# Patient Record
Sex: MALE | Race: WHITE | NOT HISPANIC OR LATINO | Employment: OTHER | URBAN - METROPOLITAN AREA
[De-identification: names, ages, dates, MRNs, and addresses within clinical notes are randomized per-mention and may not be internally consistent; named-entity substitution may affect disease eponyms.]

---

## 2017-03-03 ENCOUNTER — OFFICE VISIT (OUTPATIENT)
Dept: ORTHOPEDIC SURGERY | Facility: CLINIC | Age: 32
End: 2017-03-03

## 2017-03-03 VITALS — WEIGHT: 296.8 LBS | BODY MASS INDEX: 39.34 KG/M2 | TEMPERATURE: 98 F | HEIGHT: 73 IN

## 2017-03-03 DIAGNOSIS — S92.215D CLOSED NONDISPLACED FRACTURE OF CUBOID OF LEFT FOOT WITH ROUTINE HEALING, SUBSEQUENT ENCOUNTER: ICD-10-CM

## 2017-03-03 DIAGNOSIS — M25.572 LEFT ANKLE PAIN, UNSPECIFIED CHRONICITY: ICD-10-CM

## 2017-03-03 DIAGNOSIS — S92.252D CLOSED DISPLACED FRACTURE OF NAVICULAR BONE OF LEFT FOOT WITH ROUTINE HEALING, SUBSEQUENT ENCOUNTER: Primary | ICD-10-CM

## 2017-03-03 DIAGNOSIS — M76.72 PERONEAL TENDONITIS, LEFT: ICD-10-CM

## 2017-03-03 DIAGNOSIS — M19.079 ARTHRITIS OF ANKLE: ICD-10-CM

## 2017-03-03 DIAGNOSIS — M25.872 ANKLE IMPINGEMENT SYNDROME, LEFT: ICD-10-CM

## 2017-03-03 PROBLEM — M25.879 ANKLE IMPINGEMENT SYNDROME: Status: ACTIVE | Noted: 2017-03-03

## 2017-03-03 PROBLEM — M76.70 PERONEAL TENDONITIS: Status: ACTIVE | Noted: 2017-03-03

## 2017-03-03 PROCEDURE — 99213 OFFICE O/P EST LOW 20 MIN: CPT | Performed by: ORTHOPAEDIC SURGERY

## 2017-03-03 PROCEDURE — 73610 X-RAY EXAM OF ANKLE: CPT | Performed by: ORTHOPAEDIC SURGERY

## 2017-03-03 PROCEDURE — 73630 X-RAY EXAM OF FOOT: CPT | Performed by: ORTHOPAEDIC SURGERY

## 2017-03-03 NOTE — PROGRESS NOTES
"Ankle Follow Up      Patient: Osvaldo Welsh    YOB: 1985 31 y.o. male    Chief Complaints: Ankle pain    History of Present Illness: Patient had injury to his left foot and ankle in July where he was pulling a rope broke and was fallen on by several other people.  He was seen and workup showed a microtrabecular fracture of the cuboid.  He was last seen on 12/9/16 and has since returned to regular work.  However he said he never had resolution of some pain that he is having over the medial ankle and now along the Achilles as well.  He said it becomes progressively worse throughout the day and improved some with rest overnight but never resolved.  He said it feels like something wants to pop in his ankle he said \"like your elbow pops sometimes\".  He also feels like his motion has not what it was prior to injury  HPI    ROS: ankle pain  Past Medical History   Diagnosis Date   • Asthma    • Diabetes        Physical Exam:   Vitals:    03/03/17 1510   Temp: 98 °F (36.7 °C)   TempSrc: Temporal Artery    Weight: 296 lb 12.8 oz (135 kg)   Height: 72.5\" (184.2 cm)     Well developed with normal mood.  He is with his mother today nonantalgic gait.  There was no focal pain over the cuboid today.  Nonantalgic gait.  He was focally tender over the medial anterior aspect of the ankle as well as some along the Achilles and a little bit along the peroneal tendons..  Good eversion strength with only mild discomfort he was able to actively invert with minimal discomfort.  Neutral dorsiflexion of the ankle but some discomfort anteromedially with maximum dorsiflexion.      Radiology: 3 of the left foot and 2 views of the left ankle were ordered today to evaluate pain reviewed and compared with x-rays from last July.  He does have some what appears to be significant spurring over the anterior aspect of the ankle.  Don't see any obvious osteochondral defects.  I don't see any obvious residual fracture or deformity of the " cuboid.      Assessment/Plan:  1.  The left foot cuboid microtrabecular fracture appears to have resolved.  2.  Left ankle pain of unclear etiology this may be an evolving stress fracture or anterior impingement with possible loose body.  I offered to give him seated work only he said he wanted to continue to work told him to manage his symptoms carefully if anything worsens she'll let me know otherwise we'll need to get an MRI of his left ankle to evaluate for stress fracture or loose body as this may require surgical treatment.  I will see him back pending those results.  He understands he is to call to schedule a follow-up appointment once his MRI has been scheduled.

## 2017-03-20 ENCOUNTER — OFFICE VISIT (OUTPATIENT)
Dept: ORTHOPEDIC SURGERY | Facility: CLINIC | Age: 32
End: 2017-03-20

## 2017-03-20 DIAGNOSIS — M19.079 ARTHRITIS OF ANKLE: ICD-10-CM

## 2017-03-20 DIAGNOSIS — M76.72 PERONEAL TENDONITIS, LEFT: ICD-10-CM

## 2017-03-20 PROCEDURE — 73721 MRI JNT OF LWR EXTRE W/O DYE: CPT | Performed by: ORTHOPAEDIC SURGERY

## 2017-03-27 ENCOUNTER — OFFICE VISIT (OUTPATIENT)
Dept: ORTHOPEDIC SURGERY | Facility: CLINIC | Age: 32
End: 2017-03-27

## 2017-03-27 VITALS — WEIGHT: 290 LBS | BODY MASS INDEX: 37.22 KG/M2 | TEMPERATURE: 97.7 F | HEIGHT: 74 IN

## 2017-03-27 DIAGNOSIS — S92.252D CLOSED DISPLACED FRACTURE OF NAVICULAR BONE OF LEFT FOOT WITH ROUTINE HEALING, SUBSEQUENT ENCOUNTER: ICD-10-CM

## 2017-03-27 DIAGNOSIS — M76.62 TENDONITIS, ACHILLES, LEFT: ICD-10-CM

## 2017-03-27 DIAGNOSIS — M25.872 ANKLE IMPINGEMENT SYNDROME, LEFT: Primary | ICD-10-CM

## 2017-03-27 PROCEDURE — 20605 DRAIN/INJ JOINT/BURSA W/O US: CPT | Performed by: ORTHOPAEDIC SURGERY

## 2017-03-27 PROCEDURE — 99214 OFFICE O/P EST MOD 30 MIN: CPT | Performed by: ORTHOPAEDIC SURGERY

## 2017-03-27 RX ORDER — METHYLPREDNISOLONE ACETATE 80 MG/ML
80 INJECTION, SUSPENSION INTRA-ARTICULAR; INTRALESIONAL; INTRAMUSCULAR; SOFT TISSUE
Status: COMPLETED | OUTPATIENT
Start: 2017-03-27 | End: 2017-03-27

## 2017-03-27 RX ORDER — LIDOCAINE HYDROCHLORIDE 10 MG/ML
1 INJECTION, SOLUTION INFILTRATION; PERINEURAL
Status: COMPLETED | OUTPATIENT
Start: 2017-03-27 | End: 2017-03-27

## 2017-03-27 RX ORDER — BUPIVACAINE HYDROCHLORIDE 5 MG/ML
1 INJECTION, SOLUTION PERINEURAL
Status: COMPLETED | OUTPATIENT
Start: 2017-03-27 | End: 2017-03-27

## 2017-03-27 RX ORDER — MELOXICAM 15 MG/1
TABLET ORAL
Qty: 30 TABLET | Refills: 1 | Status: SHIPPED | OUTPATIENT
Start: 2017-03-27 | End: 2017-12-20

## 2017-03-27 RX ADMIN — BUPIVACAINE HYDROCHLORIDE 1 ML: 5 INJECTION, SOLUTION PERINEURAL at 12:05

## 2017-03-27 RX ADMIN — LIDOCAINE HYDROCHLORIDE 1 ML: 10 INJECTION, SOLUTION INFILTRATION; PERINEURAL at 12:05

## 2017-03-27 RX ADMIN — METHYLPREDNISOLONE ACETATE 80 MG: 80 INJECTION, SUSPENSION INTRA-ARTICULAR; INTRALESIONAL; INTRAMUSCULAR; SOFT TISSUE at 12:05

## 2017-03-27 NOTE — PROGRESS NOTES
"Ankle Follow Up      Patient: Osvaldo Welsh    YOB: 1985 31 y.o. male    Chief Complaints: Ankle hurts    History of Present Illness: Persistent mild to moderate intermittent aching pain over the anteromedial and some posterior lateral aspect of the left ankle with prolonged walking and standing especially going up and down steps.  This os subsequent to injury to his left foot and ankle last July.  He was pulling a rope which broke and was fallen on by several people.  He initially was seen where workup showed a cuboid microtrabecular fracture.  Pain is worse with steps  HPI    ROS: ankle pain no numbness tingling chest pain or shortness of breath  Past Medical History:   Diagnosis Date   • Asthma    • Diabetes        Physical Exam:   Vitals:    03/27/17 1106   Temp: 97.7 °F (36.5 °C)   TempSrc: Temporal Artery    Weight: 290 lb (132 kg)   Height: 74\" (188 cm)     Well developed with normal mood.  Nonantalgic gait.  Main area that he indicates pain over the anteromedial aspect and somewhat posterior laterally along the left ankle.  Moderate discomfort anteromedially with maximum dorsiflexion , trace effusion to the left ankle.  Mild discomfort along the posterior lateral aspect left Achilles with neutral dorsiflexion with 5 out of 5 plantarflexion strength with no palpable defects of the Achilles.      Radiology: MRI films and report of the left hindfoot dated 3/20/17 were reviewed shows normal marrow signal throughout the ankle and hindfoot cuboid signal is now normal.  There is some ossification anterior to the medial malleolus and a rigid bony prominence along the anteromedial edge of the talar dome but no adjacent soft tissue edema.  Also some ridge of spur at the Achilles insertion and at the origin of the plantar aponeurosis.  The Achilles and plantar aponeurosis normal in signal and thickness today.      Assessment/Plan: 1.  Left ankle probable anteromedial impingement.  I don't see any " obvious loose bodies but does have some bony prominence to this area that may be giving him some impingement symptoms.    2.  Left Achilles tendinosis.    3.  Left cuboid microtrabecular fracture appears to have resolved clinically and radiographically.    The nothing to do from an operative standpoint for either of these at this time.  We discussed treatment options and he is been taking ibuprofen 800 mg 3 times a day with some relief.  I will have him stop this and change him to meloxicam 15 g being by mouth daily with GI precautions.    Also have him use his ASO for the next 3 weeks or so at work and see if this helps reduce of his ankle pain.  Also left ankle was injected anteromedially with steroid.  Also demonstrated heel cord stretching exercises from to do at least 4-5 times per day these were demonstrated for him.  I will see him back in 6 weeks is not at all improved in 3-4 weeks he is to let me know and call and to get a CT scan of his left ankle to get a better 3-D image of the morphology of his spurs for possible surgical planning.     Medium Joint Arthrocentesis  Date/Time: 3/27/2017 12:05 PM  Consent given by: patient  Site marked: site marked  Timeout: Immediately prior to procedure a time out was called to verify the correct patient, procedure, equipment, support staff and site/side marked as required   Supporting Documentation  Indications: pain   Procedure Details  Location: ankle - L ankle  Preparation: Patient was prepped and draped in the usual sterile fashion  Needle size: 22 G  Approach: anteromedial  Medications administered: 80 mg methylPREDNISolone acetate 80 MG/ML; 1 mL lidocaine 1 %; 1 mL bupivacaine  Patient tolerance: patient tolerated the procedure well with no immediate complications

## 2017-12-12 ENCOUNTER — APPOINTMENT (OUTPATIENT)
Dept: CT IMAGING | Facility: HOSPITAL | Age: 32
End: 2017-12-12

## 2017-12-12 ENCOUNTER — HOSPITAL ENCOUNTER (EMERGENCY)
Facility: HOSPITAL | Age: 32
Discharge: HOME | End: 2017-12-12
Attending: EMERGENCY MEDICINE | Admitting: EMERGENCY MEDICINE

## 2017-12-12 VITALS
OXYGEN SATURATION: 98 % | HEART RATE: 87 BPM | BODY MASS INDEX: 38.5 KG/M2 | HEIGHT: 74 IN | DIASTOLIC BLOOD PRESSURE: 96 MMHG | WEIGHT: 300 LBS | TEMPERATURE: 98.6 F | RESPIRATION RATE: 18 BRPM | SYSTOLIC BLOOD PRESSURE: 125 MMHG

## 2017-12-12 DIAGNOSIS — E88.89 KETOSIS (HCC): ICD-10-CM

## 2017-12-12 DIAGNOSIS — I10 UNCONTROLLED HYPERTENSION: ICD-10-CM

## 2017-12-12 DIAGNOSIS — H35.031 HYPERTENSIVE RETINOPATHY OF RIGHT EYE: ICD-10-CM

## 2017-12-12 DIAGNOSIS — E11.319 DIABETIC RETINOPATHY OF RIGHT EYE ASSOCIATED WITH TYPE 2 DIABETES MELLITUS, MACULAR EDEMA PRESENCE UNSPECIFIED, UNSPECIFIED RETINOPATHY SEVERITY (HCC): ICD-10-CM

## 2017-12-12 DIAGNOSIS — E11.65 UNCONTROLLED TYPE 2 DIABETES MELLITUS WITH HYPERGLYCEMIA, WITHOUT LONG-TERM CURRENT USE OF INSULIN (HCC): Primary | ICD-10-CM

## 2017-12-12 LAB
ALBUMIN SERPL-MCNC: 3.9 G/DL (ref 3.5–5.2)
ALBUMIN/GLOB SERPL: 0.9 G/DL
ALP SERPL-CCNC: 136 U/L (ref 39–117)
ALT SERPL W P-5'-P-CCNC: 30 U/L (ref 1–41)
ANION GAP SERPL CALCULATED.3IONS-SCNC: 14 MMOL/L
ARTERIAL PATENCY WRIST A: POSITIVE
AST SERPL-CCNC: 19 U/L (ref 1–40)
ATMOSPHERIC PRESS: 750.6 MMHG
B-OH-BUTYR SERPL-SCNC: 0.46 MMOL/L (ref 0.02–0.27)
BASE EXCESS BLDA CALC-SCNC: -1.5 MMOL/L (ref 0–2)
BASOPHILS # BLD AUTO: 0.04 10*3/MM3 (ref 0–0.2)
BASOPHILS NFR BLD AUTO: 0.3 % (ref 0–1.5)
BDY SITE: ABNORMAL
BILIRUB SERPL-MCNC: 0.3 MG/DL (ref 0.1–1.2)
BILIRUB UR QL STRIP: NEGATIVE
BUN BLD-MCNC: 20 MG/DL (ref 6–20)
BUN/CREAT SERPL: 22.2 (ref 7–25)
CALCIUM SPEC-SCNC: 9.2 MG/DL (ref 8.6–10.5)
CHLORIDE SERPL-SCNC: 97 MMOL/L (ref 98–107)
CLARITY UR: CLEAR
CO2 SERPL-SCNC: 26 MMOL/L (ref 22–29)
COLOR UR: YELLOW
CREAT BLD-MCNC: 0.9 MG/DL (ref 0.76–1.27)
DEPRECATED RDW RBC AUTO: 38.6 FL (ref 37–54)
EOSINOPHIL # BLD AUTO: 0.18 10*3/MM3 (ref 0–0.7)
EOSINOPHIL NFR BLD AUTO: 1.4 % (ref 0.3–6.2)
ERYTHROCYTE [DISTWIDTH] IN BLOOD BY AUTOMATED COUNT: 12.2 % (ref 11.5–14.5)
GFR SERPL CREATININE-BSD FRML MDRD: 97 ML/MIN/1.73
GLOBULIN UR ELPH-MCNC: 4.4 GM/DL
GLUCOSE BLD-MCNC: 272 MG/DL (ref 65–99)
GLUCOSE BLDC GLUCOMTR-MCNC: 235 MG/DL (ref 70–130)
GLUCOSE BLDC GLUCOMTR-MCNC: 288 MG/DL (ref 70–130)
GLUCOSE UR STRIP-MCNC: ABNORMAL MG/DL
HCO3 BLDA-SCNC: 22.4 MMOL/L (ref 22–28)
HCT VFR BLD AUTO: 52.6 % (ref 40.4–52.2)
HGB BLD-MCNC: 18.2 G/DL (ref 13.7–17.6)
HGB UR QL STRIP.AUTO: NEGATIVE
HOLD SPECIMEN: NORMAL
HOLD SPECIMEN: NORMAL
IMM GRANULOCYTES # BLD: 0.04 10*3/MM3 (ref 0–0.03)
IMM GRANULOCYTES NFR BLD: 0.3 % (ref 0–0.5)
KETONES UR QL STRIP: ABNORMAL
LEUKOCYTE ESTERASE UR QL STRIP.AUTO: NEGATIVE
LYMPHOCYTES # BLD AUTO: 4.35 10*3/MM3 (ref 0.9–4.8)
LYMPHOCYTES NFR BLD AUTO: 34.8 % (ref 19.6–45.3)
MCH RBC QN AUTO: 30.6 PG (ref 27–32.7)
MCHC RBC AUTO-ENTMCNC: 34.6 G/DL (ref 32.6–36.4)
MCV RBC AUTO: 88.4 FL (ref 79.8–96.2)
MODALITY: ABNORMAL
MONOCYTES # BLD AUTO: 0.76 10*3/MM3 (ref 0.2–1.2)
MONOCYTES NFR BLD AUTO: 6.1 % (ref 5–12)
NEUTROPHILS # BLD AUTO: 7.13 10*3/MM3 (ref 1.9–8.1)
NEUTROPHILS NFR BLD AUTO: 57.1 % (ref 42.7–76)
NITRITE UR QL STRIP: NEGATIVE
PCO2 BLDA: 34.9 MM HG (ref 35–45)
PH BLDA: 7.41 PH UNITS (ref 7.35–7.45)
PH UR STRIP.AUTO: <=5 [PH] (ref 5–8)
PLATELET # BLD AUTO: 219 10*3/MM3 (ref 140–500)
PMV BLD AUTO: 10.7 FL (ref 6–12)
PO2 BLDA: 95.8 MM HG (ref 80–100)
POTASSIUM BLD-SCNC: 3.9 MMOL/L (ref 3.5–5.2)
PROT SERPL-MCNC: 8.3 G/DL (ref 6–8.5)
PROT UR QL STRIP: NEGATIVE
RBC # BLD AUTO: 5.95 10*6/MM3 (ref 4.6–6)
SAO2 % BLDCOA: 97.6 % (ref 92–99)
SET MECH RESP RATE: 18
SODIUM BLD-SCNC: 137 MMOL/L (ref 136–145)
SP GR UR STRIP: >=1.03 (ref 1–1.03)
UROBILINOGEN UR QL STRIP: ABNORMAL
WBC NRBC COR # BLD: 12.5 10*3/MM3 (ref 4.5–10.7)
WHOLE BLOOD HOLD SPECIMEN: NORMAL
WHOLE BLOOD HOLD SPECIMEN: NORMAL

## 2017-12-12 PROCEDURE — 63710000001 INSULIN REGULAR HUMAN PER 5 UNITS: Performed by: EMERGENCY MEDICINE

## 2017-12-12 PROCEDURE — 96374 THER/PROPH/DIAG INJ IV PUSH: CPT

## 2017-12-12 PROCEDURE — 85025 COMPLETE CBC W/AUTO DIFF WBC: CPT | Performed by: EMERGENCY MEDICINE

## 2017-12-12 PROCEDURE — 81003 URINALYSIS AUTO W/O SCOPE: CPT | Performed by: EMERGENCY MEDICINE

## 2017-12-12 PROCEDURE — 82962 GLUCOSE BLOOD TEST: CPT

## 2017-12-12 PROCEDURE — 82010 KETONE BODYS QUAN: CPT | Performed by: EMERGENCY MEDICINE

## 2017-12-12 PROCEDURE — 80053 COMPREHEN METABOLIC PANEL: CPT | Performed by: EMERGENCY MEDICINE

## 2017-12-12 PROCEDURE — 36600 WITHDRAWAL OF ARTERIAL BLOOD: CPT

## 2017-12-12 PROCEDURE — 36415 COLL VENOUS BLD VENIPUNCTURE: CPT | Performed by: EMERGENCY MEDICINE

## 2017-12-12 PROCEDURE — 82803 BLOOD GASES ANY COMBINATION: CPT

## 2017-12-12 PROCEDURE — 99284 EMERGENCY DEPT VISIT MOD MDM: CPT

## 2017-12-12 PROCEDURE — 70450 CT HEAD/BRAIN W/O DYE: CPT

## 2017-12-12 PROCEDURE — 96361 HYDRATE IV INFUSION ADD-ON: CPT

## 2017-12-12 RX ORDER — LISINOPRIL 20 MG/1
20 TABLET ORAL DAILY
Qty: 30 TABLET | Refills: 0 | Status: SHIPPED | OUTPATIENT
Start: 2017-12-12 | End: 2017-12-20 | Stop reason: SDUPTHER

## 2017-12-12 RX ORDER — LABETALOL HYDROCHLORIDE 5 MG/ML
10 INJECTION, SOLUTION INTRAVENOUS ONCE
Status: COMPLETED | OUTPATIENT
Start: 2017-12-12 | End: 2017-12-12

## 2017-12-12 RX ORDER — SODIUM CHLORIDE 0.9 % (FLUSH) 0.9 %
10 SYRINGE (ML) INJECTION AS NEEDED
Status: DISCONTINUED | OUTPATIENT
Start: 2017-12-12 | End: 2017-12-13 | Stop reason: HOSPADM

## 2017-12-12 RX ORDER — IBUPROFEN 800 MG/1
800 TABLET ORAL EVERY 8 HOURS PRN
COMMUNITY
End: 2017-12-20

## 2017-12-12 RX ORDER — GLIMEPIRIDE 4 MG/1
4 TABLET ORAL
Qty: 30 TABLET | Refills: 0 | Status: SHIPPED | OUTPATIENT
Start: 2017-12-12 | End: 2017-12-20 | Stop reason: SDUPTHER

## 2017-12-12 RX ADMIN — SODIUM CHLORIDE 1000 ML: 9 INJECTION, SOLUTION INTRAVENOUS at 21:03

## 2017-12-12 RX ADMIN — LABETALOL HYDROCHLORIDE 10 MG: 5 INJECTION, SOLUTION INTRAVENOUS at 21:03

## 2017-12-12 RX ADMIN — INSULIN HUMAN 5 UNITS: 100 INJECTION, SOLUTION PARENTERAL at 22:40

## 2017-12-13 NOTE — ED TRIAGE NOTES
"Pt states that he woke up this past Friday am with blurred vision in right eye.  He went to see eye doctor this am andwas told he had hemorrhaging and swelling in the optic nerve but before he could be treated, he needed to get BP and blood sugar under control.  Pt states he has not been to doctor for treatment of BS and BP since 2010 r/t not having insurance. Pt states \"I dont feel any different than normal except not being able to see\"  "

## 2017-12-13 NOTE — ED PROVIDER NOTES
" EMERGENCY DEPARTMENT ENCOUNTER    CHIEF COMPLAINT  Chief Complaint: blurred vision  History given by: patient, mother  History limited by: nothing  Room Number: 23/23  PMD: Reji Burciaga MD      HPI:  Pt is a 32 y.o. male who presents complaining of blurred vision in his right eye for the last 5 days. Pt states that he originally only had blurred vision in his peripheral vision and it has gradually become worse since that time. Pt states that he last saw a physician in 2010, when he lost his vision in his left eye for an unspecified reason. Pt states that he was prescribed metformin in 2010 but states that he has not taken it since that time. Pt denies N/V, CP, SOA, numbness or tingling. Pt states that his BP was 200's/100's and his blood glucose was 498 last night. Pt states that he was sent to the ED by his opthalmologist for further evaluation of the \"hemorrhages behind both of his eyes\", hyperglycemia and hypertension.    Duration:  5 days  Onset: gradual  Timing: constant  Location: right eye  Radiation: N/A  Quality: blurred  Intensity/Severity: moderate  Progression: worsening  Associated Symptoms: none  Aggravating Factors: none  Alleviating Factors: none  Previous Episodes: none  Treatment before arrival: Pt states that he was sent to the ED by his opthalmologist for further evaluation of the \"hemorrhages behind both of his eyes\", hyperglycemia and hypertension.      PAST MEDICAL HISTORY  Active Ambulatory Problems     Diagnosis Date Noted   • Closed displaced fracture of navicular bone of left foot 08/02/2016   • Sprain 08/02/2016   • Closed nondisplaced fracture of cuboid of left foot 08/15/2016   • Sprain of right ankle 09/08/2016   • Arthritis of ankle 03/03/2017   • Peroneal tendonitis 03/03/2017   • Ankle impingement syndrome 03/03/2017   • Left ankle pain 03/03/2017   • Tendonitis, Achilles, left 03/27/2017     Resolved Ambulatory Problems     Diagnosis Date Noted   • No Resolved Ambulatory " Problems     Past Medical History:   Diagnosis Date   • Asthma    • Diabetes    • Hypertension    • Optic nerve hemorrhage, left        PAST SURGICAL HISTORY  Past Surgical History:   Procedure Laterality Date   • TONSILLECTOMY AND ADENOIDECTOMY         FAMILY HISTORY  Family History   Problem Relation Age of Onset   • Diabetes Father    • Hypertension Maternal Grandmother    • Hypertension Maternal Grandfather    • Diabetes Paternal Grandmother    • Diabetes Paternal Grandfather        SOCIAL HISTORY  Social History     Social History   • Marital status: Single     Spouse name: N/A   • Number of children: N/A   • Years of education: N/A     Occupational History   • Not on file.     Social History Main Topics   • Smoking status: Never Smoker   • Smokeless tobacco: Never Used   • Alcohol use No   • Drug use: No   • Sexual activity: Defer     Other Topics Concern   • Not on file     Social History Narrative   • No narrative on file       ALLERGIES  Review of patient's allergies indicates no known allergies.    REVIEW OF SYSTEMS  Review of Systems   Constitutional: Negative for activity change, appetite change and fever.   HENT: Negative for congestion and sore throat.    Eyes: Positive for visual disturbance (blurred vision in right eye).   Respiratory: Negative for cough and shortness of breath.    Cardiovascular: Negative for chest pain and leg swelling.   Gastrointestinal: Negative for abdominal pain, diarrhea and vomiting.   Endocrine: Negative.    Genitourinary: Negative for decreased urine volume and dysuria.   Musculoskeletal: Negative for neck pain.   Skin: Negative for rash and wound.   Allergic/Immunologic: Negative.    Neurological: Negative for weakness, numbness and headaches.   Hematological: Negative.    Psychiatric/Behavioral: Negative.    All other systems reviewed and are negative.      PHYSICAL EXAM  ED Triage Vitals   Temp Heart Rate Resp BP SpO2   12/12/17 1857 12/12/17 1857 12/12/17 1857 12/12/17  1935 12/12/17 1857   98.4 °F (36.9 °C) 109 18 186/107 99 %      Temp src Heart Rate Source Patient Position BP Location FiO2 (%)   -- -- 12/12/17 1935 12/12/17 1935 --     Sitting Left arm        Physical Exam   Constitutional: He is oriented to person, place, and time and well-developed, well-nourished, and in no distress.   HENT:   Head: Normocephalic and atraumatic.   Eyes: EOM are normal. Pupils are equal, round, and reactive to light.   No visible hemorrhage on fundoscopic exam   Neck: Normal range of motion. Neck supple.   Cardiovascular: Regular rhythm and normal heart sounds.  Tachycardia present.    Pulmonary/Chest: Effort normal and breath sounds normal. No respiratory distress.   Abdominal: Soft. There is no tenderness. There is no rebound and no guarding.   obese   Musculoskeletal: Normal range of motion. He exhibits no edema.   Neurological: He is alert and oriented to person, place, and time. He has normal sensation and normal strength.   Skin: Skin is warm and dry.   Psychiatric: Mood and affect normal.   Nursing note and vitals reviewed.      LAB RESULTS  Lab Results (last 24 hours)     Procedure Component Value Units Date/Time    POC Glucose Once [[id number]]  (Abnormal) Collected:  12/12/17 1902    Specimen:  Blood Updated:  12/12/17 1903     Glucose 288 (H) mg/dL     Narrative:       Meter: [id number] : [id number] Hugo Pratt    CBC & Differential [[id number]] Collected:  12/12/17 2008    Specimen:  Blood Updated:  12/12/17 2028    Narrative:       The following orders were created for panel order CBC & Differential.  Procedure                               Abnormality         Status                     ---------                               -----------         ------                     CBC Auto Differential[[id number]]        Abnormal            Final result                 Please view results for these tests on the individual orders.    Comprehensive Metabolic Panel [[id number]]  (Abnormal)  Collected:  12/12/17 2008    Specimen:  Blood Updated:  12/12/17 2110     Glucose 272 (H) mg/dL      BUN 20 mg/dL      Creatinine 0.90 mg/dL      Sodium 137 mmol/L      Potassium 3.9 mmol/L      Chloride 97 (L) mmol/L      CO2 26.0 mmol/L      Calcium 9.2 mg/dL      Total Protein 8.3 g/dL      Albumin 3.90 g/dL      ALT (SGPT) 30 U/L      AST (SGOT) 19 U/L      Alkaline Phosphatase 136 (H) U/L      Total Bilirubin 0.3 mg/dL      eGFR Non African Amer 97 mL/min/1.73      Globulin 4.4 gm/dL      A/G Ratio 0.9 g/dL      BUN/Creatinine Ratio 22.2     Anion Gap 14.0 mmol/L     Ketone Bodies, Serum (Not performed at [facility]) [[id number]] Collected:  12/12/17 2008    Specimen:  Blood Updated:  12/12/17 2110    Narrative:       The following orders were created for panel order Ketone Bodies, Serum (Not performed at [facility]).  Procedure                               Abnormality         Status                     ---------                               -----------         ------                     Beta Hydroxybutyrate Teofilo...[[id number]]  Abnormal            Final result                 Please view results for these tests on the individual orders.    CBC Auto Differential [[id number]]  (Abnormal) Collected:  12/12/17 2008    Specimen:  Blood Updated:  12/12/17 2028     WBC 12.50 (H) 10*3/mm3      RBC 5.95 10*6/mm3      Hemoglobin 18.2 (H) g/dL      Hematocrit 52.6 (H) %      MCV 88.4 fL      MCH 30.6 pg      MCHC 34.6 g/dL      RDW 12.2 %      RDW-SD 38.6 fl      MPV 10.7 fL      Platelets 219 10*3/mm3      Neutrophil % 57.1 %      Lymphocyte % 34.8 %      Monocyte % 6.1 %      Eosinophil % 1.4 %      Basophil % 0.3 %      Immature Grans % 0.3 %      Neutrophils, Absolute 7.13 10*3/mm3      Lymphocytes, Absolute 4.35 10*3/mm3      Monocytes, Absolute 0.76 10*3/mm3      Eosinophils, Absolute 0.18 10*3/mm3      Basophils, Absolute 0.04 10*3/mm3      Immature Grans, Absolute 0.04 (H) 10*3/mm3     Beta Hydroxybutyrate Quantitative  [[id number]]  (Abnormal) Collected:  12/12/17 2008    Specimen:  Blood Updated:  12/12/17 2110     Beta-Hydroxybutyrate Quant 0.464 (H) mmol/L     Urinalysis With / Culture If Indicated - Urine, Clean Catch [[id number]]  (Abnormal) Collected:  12/12/17 2013    Specimen:  Urine from Urine, Clean Catch Updated:  12/12/17 2025     Color, UA Yellow     Appearance, UA Clear     pH, UA <=5.0     Specific Gravity, UA >=1.030     Glucose, UA >=1000 mg/dL (3+) (A)     Ketones, UA 15 mg/dL (1+) (A)     Bilirubin, UA Negative     Blood, UA Negative     Protein, UA Negative     Leuk Esterase, UA Negative     Nitrite, UA Negative     Urobilinogen, UA 0.2 E.U./dL    Narrative:       Urine microscopic not indicated.    Blood Gas, Arterial [[id number]]  (Abnormal) Collected:  12/12/17 2202    Specimen:  Arterial Blood Updated:  12/12/17 2204     Site Arterial: right radial     Nico's Test Positive     pH, Arterial 7.414 pH units      pCO2, Arterial 34.9 (L) mm Hg      pO2, Arterial 95.8 mm Hg      HCO3, Arterial 22.4 mmol/L      Base Excess, Arterial -1.5 (L) mmol/L      O2 Saturation Calculated 97.6 %      Barometric Pressure for Blood Gas 750.6 mmHg      Modality Room Air     Set Mech Resp Rate 18    Narrative:       sat 97 Meter: [id number] : [id number] Mumtaz Collins    POC Glucose Once [[id number]]  (Abnormal) Collected:  12/12/17 2222    Specimen:  Blood Updated:  12/12/17 2224     Glucose 235 (H) mg/dL     Narrative:       Meter: [id number] : [id number] Arun Jones RN          I ordered the above labs and reviewed the results    RADIOLOGY  CT Head Without Contrast   Preliminary Result   No definite acute intracranial pathology. Orbits are unremarkable.                       I ordered the above noted radiological studies. Interpreted by radiologist. Reviewed by me in PACS.       PROCEDURES  Procedures      PROGRESS AND CONSULTS  ED Course     2040- Ordered IVF for hydration and labetalol for HTN. Ordered CT Head for  further evaluation.    2232- Ordered insulin for hyperglycemia. Placed call to [facility] for admission.    10:36 PM  Latest vital signs   BP- 144/99 HR- 83 Temp- 98.4 °F (36.9 °C) O2 sat- 98%    2246- Discussed the pt's case with Dr. Tang ([facility]), who suggests discharging the pt home on lisinopril and amaryl.    2251- Rechecked pt. Pt is resting comfortably and HK=282/91. Notified pt and family of the pt's lab and imaging results, including the pt's elevated blood glucose and CT Head results. Discussed the plan to discharge the pt home with prescriptions for lisinopril and amaryl. I instructed the pt to follow up with his PMD. Pt and family agree with the plan and all questions were addressed.    10:59 PM  Latest vital signs   BP- 138/95 HR- 83 Temp- 98.4 °F (36.9 °C) O2 sat- 97%      MEDICAL DECISION MAKING  Results were reviewed/discussed with the patient and they were also made aware of online access. Pt also made aware that some labs, such as cultures, will not be resulted during ER visit and follow up with PMD is necessary.     MDM  Number of Diagnoses or Management Options  Diabetic retinopathy of right eye associated with type 2 diabetes mellitus, macular edema presence unspecified, unspecified retinopathy severity:   Hypertensive retinopathy of right eye:   Ketosis:   Uncontrolled hypertension:   Uncontrolled type 2 diabetes mellitus with hyperglycemia, without long-term current use of insulin:   Diagnosis management comments: Reviewed paper work sent with patient from Ophthalmologist. Dx with Diabetic and Hypertensive Retinopathy.         Amount and/or Complexity of Data Reviewed  Clinical lab tests: ordered and reviewed (Beta-hydroxybutyrate=0.464)  Tests in the radiology section of CPT®: ordered and reviewed (CT Head shows nothing acute)  Obtain history from someone other than the patient: yes (mother)  Discuss the patient with other providers: yes (D/w Dr. Tang ([facility]))  Independent visualization of images,  tracings, or specimens: yes    Patient Progress  Patient progress: stable         DIAGNOSIS  Final diagnoses:   Uncontrolled type 2 diabetes mellitus with hyperglycemia, without long-term current use of insulin   Ketosis   Uncontrolled hypertension   Diabetic retinopathy of right eye associated with type 2 diabetes mellitus, macular edema presence unspecified, unspecified retinopathy severity   Hypertensive retinopathy of right eye       DISPOSITION  DISCHARGE    Patient discharged in stable condition.    Reviewed implications of results, diagnosis, meds, responsibility to follow up, warning signs and symptoms of possible worsening, potential complications and reasons to return to ER, including fever, worsening pain or any concerns.    Patient/Family voiced understanding of above instructions.    Discussed plan for discharge, as there is no emergent indication for admission.  Pt/family is agreeable and understands need for follow up and repeat testing.  Pt is aware that discharge does not mean that nothing is wrong but it indicates no emergency is present that requires admission and they must continue care with follow-up as given below or physician of their choice.     FOLLOW-UP  Reji Burciaga MD  [id number] [id number] [phone]    Schedule an appointment as soon as possible for a visit           Medication List      New Prescriptions          glimepiride 4 MG tablet   Commonly known as:  AMARYL   Take 1 tablet by mouth Every Morning Before Breakfast.       lisinopril 20 MG tablet   Commonly known as:  PRINIVIL,ZESTRIL   Take 1 tablet by mouth Daily.         Stop          meloxicam 15 MG tablet   Commonly known as:  MOBIC       omeprazole 40 MG capsule   Commonly known as:  PRILOSEC             Latest Documented Vital Signs:  As of 11:00 PM  BP- 138/95 HR- 83 Temp- 98.4 °F (36.9 °C) O2 sat- 97%    --  Documentation assistance provided by mark Morris for Dr. Graham.  Information  recorded by the scribe was done at my direction and has been verified and validated by me.     Valorie Morris  12/12/17 [id number]       Rashad Graham MD  12/12/17 [id number]

## 2017-12-14 ENCOUNTER — TELEPHONE (OUTPATIENT)
Dept: SOCIAL WORK | Facility: HOSPITAL | Age: 32
End: 2017-12-14

## 2017-12-14 NOTE — TELEPHONE ENCOUNTER
Spoke with pt today in f/u and he states he is doing better. He was able to get his scripts filled for the Glimepiride and Lisinopril and is taking them as directed. He has a f/u with his PCP on 12/19/17. No other questions or concerns voiced by pt at this time. Patricia JOHNSON

## 2017-12-20 ENCOUNTER — OFFICE VISIT (OUTPATIENT)
Dept: FAMILY MEDICINE CLINIC | Facility: CLINIC | Age: 32
End: 2017-12-20

## 2017-12-20 ENCOUNTER — TELEPHONE (OUTPATIENT)
Dept: FAMILY MEDICINE CLINIC | Facility: CLINIC | Age: 32
End: 2017-12-20

## 2017-12-20 VITALS
OXYGEN SATURATION: 97 % | HEART RATE: 94 BPM | WEIGHT: 281 LBS | SYSTOLIC BLOOD PRESSURE: 130 MMHG | HEIGHT: 74 IN | BODY MASS INDEX: 36.06 KG/M2 | DIASTOLIC BLOOD PRESSURE: 82 MMHG | TEMPERATURE: 97.9 F

## 2017-12-20 DIAGNOSIS — H44.813: ICD-10-CM

## 2017-12-20 DIAGNOSIS — E11.9 TYPE 2 DIABETES MELLITUS WITHOUT COMPLICATION, WITHOUT LONG-TERM CURRENT USE OF INSULIN (HCC): Primary | ICD-10-CM

## 2017-12-20 DIAGNOSIS — I10 ESSENTIAL HYPERTENSION: ICD-10-CM

## 2017-12-20 DIAGNOSIS — IMO0001 CLASS 2 OBESITY DUE TO EXCESS CALORIES WITH SERIOUS COMORBIDITY AND BODY MASS INDEX (BMI) OF 36.0 TO 36.9 IN ADULT: ICD-10-CM

## 2017-12-20 LAB
ALBUMIN SERPL-MCNC: 4 G/DL (ref 3.5–5.2)
ALBUMIN UR-MCNC: 20 MG/L (ref 0–20)
ALBUMIN/GLOB SERPL: 1.1 G/DL
ALP SERPL-CCNC: 110 U/L (ref 39–117)
ALT SERPL W P-5'-P-CCNC: 41 U/L (ref 1–41)
ANION GAP SERPL CALCULATED.3IONS-SCNC: 13.4 MMOL/L
AST SERPL-CCNC: 22 U/L (ref 1–40)
BACTERIA UR QL AUTO: ABNORMAL /HPF
BILIRUB SERPL-MCNC: 0.4 MG/DL (ref 0.1–1.2)
BILIRUB UR QL STRIP: NEGATIVE
BUN BLD-MCNC: 22 MG/DL (ref 6–20)
BUN/CREAT SERPL: 25.6 (ref 7–25)
CALCIUM SPEC-SCNC: 9.3 MG/DL (ref 8.6–10.5)
CHLORIDE SERPL-SCNC: 98 MMOL/L (ref 98–107)
CHOLEST SERPL-MCNC: 208 MG/DL (ref 0–200)
CLARITY UR: CLEAR
CO2 SERPL-SCNC: 24.6 MMOL/L (ref 22–29)
COLOR UR: YELLOW
CREAT BLD-MCNC: 0.86 MG/DL (ref 0.76–1.27)
ERYTHROCYTE [DISTWIDTH] IN BLOOD BY AUTOMATED COUNT: 12 % (ref 4.5–15)
GFR SERPL CREATININE-BSD FRML MDRD: 102 ML/MIN/1.73
GLOBULIN UR ELPH-MCNC: 3.8 GM/DL
GLUCOSE BLD-MCNC: 263 MG/DL (ref 65–99)
GLUCOSE UR STRIP-MCNC: ABNORMAL MG/DL
HBA1C MFR BLD: 11.49 % (ref 4.8–5.6)
HCT VFR BLD AUTO: 53 % (ref 35–60)
HDLC SERPL-MCNC: 36 MG/DL (ref 40–60)
HGB BLD-MCNC: 17 G/DL (ref 13.5–18)
HGB UR QL STRIP.AUTO: NEGATIVE
KETONES UR QL STRIP: NEGATIVE
LDLC SERPL CALC-MCNC: 137 MG/DL (ref 0–100)
LDLC/HDLC SERPL: 3.81 {RATIO}
LEUKOCYTE ESTERASE UR QL STRIP.AUTO: NEGATIVE
LYMPHOCYTES # BLD AUTO: 3.6 10*3/MM3 (ref 1.2–3.4)
LYMPHOCYTES NFR BLD AUTO: 31.9 % (ref 21–51)
MCH RBC QN AUTO: 28.5 PG (ref 26.1–33.1)
MCHC RBC AUTO-ENTMCNC: 32.1 G/DL (ref 33–37)
MCV RBC AUTO: 88.8 FL (ref 80–99)
MONOCYTES # BLD AUTO: 0.4 10*3/MM3 (ref 0.1–0.6)
MONOCYTES NFR BLD AUTO: 3.3 % (ref 2–9)
NEUTROPHILS # BLD AUTO: 7.3 10*3/MM3 (ref 1.4–6.5)
NEUTROPHILS NFR BLD AUTO: 64.8 % (ref 42–75)
NITRITE UR QL STRIP: NEGATIVE
PH UR STRIP.AUTO: <=5 [PH] (ref 4.6–8)
PLATELET # BLD AUTO: 237 10*3/MM3 (ref 150–450)
PMV BLD AUTO: 8.3 FL (ref 7.1–10.5)
POTASSIUM BLD-SCNC: 4.4 MMOL/L (ref 3.5–5.2)
PROT SERPL-MCNC: 7.8 G/DL (ref 6–8.5)
PROT UR QL STRIP: NEGATIVE
RBC # BLD AUTO: 5.97 10*6/MM3 (ref 4–6)
RBC # UR: ABNORMAL /HPF
REF LAB TEST METHOD: ABNORMAL
SODIUM BLD-SCNC: 136 MMOL/L (ref 136–145)
SP GR UR STRIP: 1.02 (ref 1–1.03)
SQUAMOUS #/AREA URNS HPF: ABNORMAL /HPF
TRIGL SERPL-MCNC: 175 MG/DL (ref 0–150)
TSH SERPL DL<=0.05 MIU/L-ACNC: 2.13 MIU/ML (ref 0.27–4.2)
UROBILINOGEN UR QL STRIP: ABNORMAL
VLDLC SERPL-MCNC: 35 MG/DL (ref 5–40)
WBC NRBC COR # BLD: 11.2 10*3/MM3 (ref 4.5–10)
WBC UR QL AUTO: ABNORMAL /HPF

## 2017-12-20 PROCEDURE — 99214 OFFICE O/P EST MOD 30 MIN: CPT | Performed by: NURSE PRACTITIONER

## 2017-12-20 PROCEDURE — 85025 COMPLETE CBC W/AUTO DIFF WBC: CPT | Performed by: NURSE PRACTITIONER

## 2017-12-20 PROCEDURE — 80061 LIPID PANEL: CPT | Performed by: NURSE PRACTITIONER

## 2017-12-20 PROCEDURE — 83036 HEMOGLOBIN GLYCOSYLATED A1C: CPT | Performed by: NURSE PRACTITIONER

## 2017-12-20 PROCEDURE — 36415 COLL VENOUS BLD VENIPUNCTURE: CPT | Performed by: NURSE PRACTITIONER

## 2017-12-20 PROCEDURE — 84443 ASSAY THYROID STIM HORMONE: CPT | Performed by: NURSE PRACTITIONER

## 2017-12-20 PROCEDURE — 82043 UR ALBUMIN QUANTITATIVE: CPT | Performed by: NURSE PRACTITIONER

## 2017-12-20 PROCEDURE — 81001 URINALYSIS AUTO W/SCOPE: CPT | Performed by: NURSE PRACTITIONER

## 2017-12-20 PROCEDURE — 80053 COMPREHEN METABOLIC PANEL: CPT | Performed by: NURSE PRACTITIONER

## 2017-12-20 RX ORDER — LISINOPRIL 20 MG/1
20 TABLET ORAL DAILY
Qty: 30 TABLET | Refills: 5 | Status: SHIPPED | OUTPATIENT
Start: 2017-12-20 | End: 2018-01-04 | Stop reason: SDUPTHER

## 2017-12-20 RX ORDER — BLOOD-GLUCOSE METER
EACH MISCELLANEOUS
Qty: 1 KIT | Refills: 0 | Status: SHIPPED | OUTPATIENT
Start: 2017-12-20

## 2017-12-20 RX ORDER — LANCETS
EACH MISCELLANEOUS
Qty: 60 EACH | Refills: 11 | Status: SHIPPED | OUTPATIENT
Start: 2017-12-20 | End: 2018-10-10 | Stop reason: SDUPTHER

## 2017-12-20 RX ORDER — ATORVASTATIN CALCIUM 10 MG/1
10 TABLET, FILM COATED ORAL NIGHTLY
Qty: 30 TABLET | Refills: 5 | Status: SHIPPED | OUTPATIENT
Start: 2017-12-20 | End: 2018-10-10 | Stop reason: SDUPTHER

## 2017-12-20 RX ORDER — GLIMEPIRIDE 4 MG/1
4 TABLET ORAL
Qty: 30 TABLET | Refills: 5 | Status: SHIPPED | OUTPATIENT
Start: 2017-12-20 | End: 2018-01-04 | Stop reason: SDUPTHER

## 2017-12-20 NOTE — PROGRESS NOTES
Subjective   Osvaldo Welsh is a 32 y.o. male.     History of Present Illness   Here to FU on [facility] ER for B hemorrhages on eyes was seeing Dr Gómez but was referred to [facility] and referred to ER d/t BP elevated 200/100 and BS elevated 498, with CT head normal, found to have ketosis and started on lisinopril 20 mg daily and glimepiride 4 mg daily, Prev took metformin back in 2010 and caused fatigue, with fam hx father also DM and can't tolerated metformin, seeing [facility] 01/04/17 needs updated labs and A1C, now checking BS at home 250s, BP running 130-140/80s, no CP dizziness HA LE edema, with cont visual disturbance, intermittent clamminess, fatigue and hot flashes prev urin freq thirst and hunger resolved    The following portions of the patient's history were reviewed and updated as appropriate: allergies, current medications, past family history, past medical history, past social history, past surgical history and problem list.    Review of Systems   Constitutional: Negative for fever.   Eyes: Positive for visual disturbance. Negative for photophobia, pain, discharge, redness and itching.   Respiratory: Negative for cough, shortness of breath and wheezing.    Cardiovascular: Negative for chest pain, palpitations and leg swelling.   Endocrine: Negative for cold intolerance, heat intolerance, polydipsia, polyphagia and polyuria.   Neurological: Negative for dizziness and headaches.   All other systems reviewed and are negative.      Objective   Physical Exam   Constitutional: He is oriented to person, place, and time. He appears well-developed and well-nourished.   HENT:   Head: Normocephalic and atraumatic.   Eyes: Conjunctivae and EOM are normal. Pupils are equal, round, and reactive to light.   Cardiovascular: Normal rate, regular rhythm and normal heart sounds.    Pulmonary/Chest: Effort normal and breath sounds normal.   Musculoskeletal: Normal range of motion.    Osvaldo had a diabetic  foot exam performed (sensation intact, pulses strong, well hydrated, no ulcers or fungal toenails) today.  Neurological: He is alert and oriented to person, place, and time.   Skin: Skin is warm and dry.   Psychiatric: He has a normal mood and affect. His behavior is normal. Judgment and thought content normal.   Vitals reviewed.      Assessment/Plan   Osvaldo was seen today for follow-up.    Diagnoses and all orders for this visit:    Type 2 diabetes mellitus without complication, without long-term current use of insulin  -     Hemoglobin A1c  -     Lipid Panel  -     Comprehensive Metabolic Panel  -     TSH  -     Urinalysis With Microscopic - Urine, Clean Catch  -     MicroAlbumin, Urine, Random - Urine, Clean Catch  -     CBC & Differential    Essential hypertension  -     Lipid Panel  -     Comprehensive Metabolic Panel    Class 2 obesity due to excess calories with serious comorbidity and body mass index (BMI) of 36.0 to 36.9 in adult    Eye hemorrhage, bilateral    Other orders  -     Lancets (ACCU-CHEK MULTICLIX) lancets; Dx e11.9 use to check BS BID ok to substitute formulary preferred  -     Blood Glucose Monitoring Suppl (ACCU-CHEK MANAV PLUS) w/Device kit; Dx e11.9 use to check BS BID ok to substitute formulary preferred  -     glucose blood (ACCU-CHEK MANAV PLUS) test strip; Dx e11.9 use to check BS BID ok to substitute formulary preferred  -     Discontinue: SITagliptin (JANUVIA) 100 MG tablet; Take 1 tablet by mouth Daily.  -     aspirin 81 MG tablet; Take 1 tablet by mouth Daily.  -     glimepiride (AMARYL) 4 MG tablet; Take 1 tablet by mouth Every Morning Before Breakfast.  -     SITagliptin (JANUVIA) 100 MG tablet; Take 1 tablet by mouth Daily.  -     lisinopril (PRINIVIL,ZESTRIL) 20 MG tablet; Take 1 tablet by mouth Daily.    reviewed hospital records, imaging, labs, check labs and call with results, erx meter, test strips, and lancets, start januvia 100 mg daily and gave samples and coupon, cont  glimepiride 4 mg daily and enc healthy DM Diet and regular exercise for wt loss and BS control, gave DM handout and diet, cont lisinopril 20 mg daily and start ASA 81 mg daily, cont FU with [facility] and will fax lab results, DM foot exam today, Current outpatient and discharge medications have been reconciled for the patient.  Brittnee Tafoya, APRN

## 2017-12-20 NOTE — PATIENT INSTRUCTIONS
reviewed hospital records, imaging, labs, check labs and call with results, erx meter, test strips, and lancets, start januvia 100 mg daily and gave samples and coupon, cont glimepiride 4 mg daily and enc healthy DM Diet and regular exercise for wt loss and BS control, gave DM handout and diet, cont lisinopril 20 mg daily and start ASA 81 mg daily, cont FU with [facility] and will fax lab results, DM foot exam today, Current outpatient and discharge medications have been reconciled for the patient.  MIGUEL Sosa  Prediabetes Eating Plan  Prediabetes--also called impaired glucose tolerance or impaired fasting glucose--is a condition that causes blood sugar (blood glucose) levels to be higher than normal. Following a healthy diet can help to keep prediabetes under control. It can also help to lower the risk of type 2 diabetes and heart disease, which are increased in people who have prediabetes. Along with regular exercise, a healthy diet:  · Promotes weight loss.  · Helps to control blood sugar levels.  · Helps to improve the way that the body uses insulin.  WHAT DO I NEED TO KNOW ABOUT THIS EATING PLAN?  · Use the glycemic index (GI) to plan your meals. The index tells you how quickly a food will raise your blood sugar. Choose low-GI foods. These foods take a longer time to raise blood sugar.  · Pay close attention to the amount of carbohydrates in the food that you eat. Carbohydrates increase blood sugar levels.  · Keep track of how many calories you take in. Eating the right amount of calories will help you to achieve a healthy weight. Losing about 7 percent of your starting weight can help to prevent type 2 diabetes.  · You may want to follow a Mediterranean diet. This diet includes a lot of vegetables, lean meats or fish, whole grains, fruits, and healthy oils and fats.  WHAT FOODS CAN I EAT?  Grains  Whole grains, such as whole-wheat or whole-grain breads, crackers, cereals, and pasta. Unsweetened  oatmeal. Bulgur. Barley. Quinoa. Brown rice. Corn or whole-wheat flour tortillas or taco shells.  Vegetables  Lettuce. Spinach. Peas. Beets. Cauliflower. Cabbage. Broccoli. Carrots. Tomatoes. Squash. Eggplant. Herbs. Peppers. Onions. Cucumbers. [place] sprouts.  Fruits  Berries. Bananas. Apples. Oranges. Grapes. Papaya. [place]. Pomegranate. Kiwi. Grapefruit. Cherries.  Meats and Other Protein Sources  Seafood. Lean meats, such as chicken and turkey or lean cuts of pork and beef. Tofu. Eggs. Nuts. Beans.  Dairy  Low-fat or fat-free dairy products, such as yogurt, cottage cheese, and cheese.  Beverages  Water. Tea. Coffee. Sugar-free or diet soda. [place] water. Milk. Milk alternatives, such as soy or almond milk.  Condiments  Mustard. Relish. Low-fat, low-sugar ketchup. Low-fat, low-sugar barbecue sauce. Low-fat or fat-free mayonnaise.  Sweets and Desserts  Sugar-free or low-fat pudding. Sugar-free or low-fat ice cream and other frozen treats.  Fats and Oils  Avocado. Walnuts. Olive oil.  The items listed above may not be a complete list of recommended foods or beverages. Contact your dietitian for more options.   WHAT FOODS ARE NOT RECOMMENDED?  Grains  Refined white flour and flour products, such as bread, pasta, snack foods, and cereals.  Beverages  Sweetened drinks, such as sweet iced tea and soda.  Sweets and Desserts  Baked goods, such as cake, cupcakes, pastries, cookies, and cheesecake.  The items listed above may not be a complete list of foods and beverages to avoid. Contact your dietitian for more information.     This information is not intended to replace advice given to you by your health care provider. Make sure you discuss any questions you have with your health care provider.     Document Released: 05/03/2016 Document Reviewed: 05/03/2016  [employer] Interactive Patient Education ©2017 [employer]  Basic Carbohydrate Counting for Diabetes Mellitus  Carbohydrate counting is a method for keeping track  "of the amount of carbohydrates you eat. Eating carbohydrates naturally increases the level of sugar (glucose) in your blood, so it is important for you to know the amount that is okay for you to have in every meal. Carbohydrate counting helps keep the level of glucose in your blood within normal limits. The amount of carbohydrates allowed is different for every person. A dietitian can help you calculate the amount that is right for you. Once you know the amount of carbohydrates you can have, you can count the carbohydrates in the foods you want to eat.  Carbohydrates are found in the following foods:  · Grains, such as breads and cereals.  · Dried beans and soy products.  · Starchy vegetables, such as potatoes, peas, and corn.  · Fruit and fruit juices.  · Milk and yogurt.  · Sweets and snack foods, such as cake, cookies, candy, chips, soft drinks, and fruit drinks.  CARBOHYDRATE COUNTING  There are two ways to count the carbohydrates in your food. You can use either of the methods or a combination of both.  Reading the \"Nutrition Facts\" on Packaged Food  The \"Nutrition Facts\" is an area that is included on the labels of almost all packaged food and beverages in the [place]. It includes the serving size of that food or beverage and information about the nutrients in each serving of the food, including the grams (g) of carbohydrate per serving.   Decide the number of servings of this food or beverage that you will be able to eat or drink. Multiply that number of servings by the number of grams of carbohydrate that is listed on the label for that serving. The total will be the amount of carbohydrates you will be having when you eat or drink this food or beverage.  Learning Standard Serving Sizes of Food  When you eat food that is not packaged or does not include \"Nutrition Facts\" on the label, you need to measure the servings in order to count the amount of carbohydrates. A serving of most carbohydrate-rich " foods contains about 15 g of carbohydrates. The following list includes serving sizes of carbohydrate-rich foods that provide 15 g of carbohydrate per serving:    · 1 slice of bread (1 oz) or 1 six-inch tortilla.    · ½ of a hamburger bun or English muffin.  · 4-6 crackers.  · ¾ cup unsweetened dry cereal.    · ½ cup hot cereal.  · ? cup rice or pasta.    · ½ cup mashed potatoes or ¼ of a large baked potato.  · 1 cup fresh fruit or one small piece of fruit.    · ½ cup canned or frozen fruit or fruit juice.  · 1 cup milk.  · ? cup plain fat-free yogurt or yogurt sweetened with artificial sweeteners.  · ½ cup cooked dried beans or starchy vegetable, such as peas, corn, or potatoes.    Decide the number of standard-size servings that you will eat. Multiply that number of servings by 15 (the grams of carbohydrates in that serving). For example, if you eat 2 cups of strawberries, you will have eaten 2 servings and 30 g of carbohydrates (2 servings x 15 g = 30 g). For foods such as soups and casseroles, in which more than one food is mixed in, you will need to count the carbohydrates in each food that is included.  EXAMPLE OF CARBOHYDRATE COUNTING  Sample Dinner   · 3 oz chicken breast.  · ? cup of brown rice.  · ½ cup of corn.  · 1 cup milk.    · 1 cup strawberries with sugar-free whipped topping.    Carbohydrate Calculation   Step 1: Identify the foods that contain carbohydrates:   · Rice.    · Corn.    · Milk.    · Strawberries.  Step 2: Calculate the number of servings eaten of each:   · 2 servings of rice.    · 1 serving of corn.    · 1 serving of milk.    · 1 serving of strawberries.  Step 3:  Multiply each of those number of servings by 15 g:   · 2 servings of rice x 15 g = 30 g.    · 1 serving of corn x 15 g = 15 g.    · 1 serving of milk x 15 g = 15 g.    · 1 serving of strawberries x 15 g = 15 g.  Step 4: Add together all of the amounts to find the total grams of carbohydrates eaten: 30 g + 15 g + 15 g + 15 g =  75 g.     This information is not intended to replace advice given to you by your health care provider. Make sure you discuss any questions you have with your health care provider.     Document Released: 12/18/2006 Document Revised: 01/08/2016 Document Reviewed: 11/14/2014  Elsevier Interactive Patient Education ©2017 [employer]vier Inc.

## 2017-12-21 ENCOUNTER — TELEPHONE (OUTPATIENT)
Dept: FAMILY MEDICINE CLINIC | Facility: CLINIC | Age: 32
End: 2017-12-21

## 2018-01-04 RX ORDER — LISINOPRIL 20 MG/1
20 TABLET ORAL DAILY
Qty: 30 TABLET | Refills: 5 | Status: SHIPPED | OUTPATIENT
Start: 2018-01-04 | End: 2018-07-03 | Stop reason: SDUPTHER

## 2018-01-04 RX ORDER — GLIMEPIRIDE 4 MG/1
4 TABLET ORAL
Qty: 30 TABLET | Refills: 5 | Status: SHIPPED | OUTPATIENT
Start: 2018-01-04 | End: 2018-03-21 | Stop reason: SDUPTHER

## 2018-01-05 ENCOUNTER — TELEPHONE (OUTPATIENT)
Dept: FAMILY MEDICINE CLINIC | Facility: CLINIC | Age: 33
End: 2018-01-05

## 2018-01-05 NOTE — TELEPHONE ENCOUNTER
PATIENT NEEDS A LETTER TO INSURANCE COMPANY STATING GLUCOSE METER IS A MEDICAL NECESSARY FOR PATIENT

## 2018-03-21 ENCOUNTER — OFFICE VISIT (OUTPATIENT)
Dept: FAMILY MEDICINE CLINIC | Facility: CLINIC | Age: 33
End: 2018-03-21

## 2018-03-21 VITALS
BODY MASS INDEX: 39.01 KG/M2 | DIASTOLIC BLOOD PRESSURE: 82 MMHG | OXYGEN SATURATION: 98 % | HEIGHT: 74 IN | SYSTOLIC BLOOD PRESSURE: 116 MMHG | WEIGHT: 304 LBS | RESPIRATION RATE: 16 BRPM | TEMPERATURE: 98.1 F | HEART RATE: 94 BPM

## 2018-03-21 DIAGNOSIS — I10 ESSENTIAL HYPERTENSION: ICD-10-CM

## 2018-03-21 DIAGNOSIS — IMO0001 CLASS 2 OBESITY DUE TO EXCESS CALORIES WITH SERIOUS COMORBIDITY AND BODY MASS INDEX (BMI) OF 36.0 TO 36.9 IN ADULT: ICD-10-CM

## 2018-03-21 DIAGNOSIS — E08.311 DIABETIC RETINOPATHY OF BOTH EYES WITH MACULAR EDEMA ASSOCIATED WITH DIABETES MELLITUS DUE TO UNDERLYING CONDITION, UNSPECIFIED RETINOPATHY SEVERITY (HCC): Primary | ICD-10-CM

## 2018-03-21 DIAGNOSIS — E11.9 TYPE 2 DIABETES MELLITUS WITHOUT COMPLICATION, WITHOUT LONG-TERM CURRENT USE OF INSULIN (HCC): ICD-10-CM

## 2018-03-21 PROBLEM — E11.319 DIABETIC RETINOPATHY (HCC): Status: ACTIVE | Noted: 2018-03-21

## 2018-03-21 PROCEDURE — 99214 OFFICE O/P EST MOD 30 MIN: CPT | Performed by: INTERNAL MEDICINE

## 2018-03-21 RX ORDER — GLIMEPIRIDE 4 MG/1
4 TABLET ORAL
Qty: 30 TABLET | Refills: 5 | Status: SHIPPED | OUTPATIENT
Start: 2018-03-21 | End: 2018-03-21 | Stop reason: ALTCHOICE

## 2018-03-21 NOTE — PROGRESS NOTES
Subjective   Osvaldo Welsh is a 32 y.o. male.     History of Present Illness   Patient is being seen for diabetic retinopathy.  Patient's blood sugars been ranging 170-230.  He was taken off Amaryl and put on metformin with an SGLT 2.  He is to remain on his DPP 4.  He was given a diabetic diet and instructed to exercise 30 minutes 3-5 times a week.  She was instructed without diagnosis eyes his blood sugar probably will not be controlled.  His sugars were dropping too low on Amaryl was taken off.  Patient will check his blood sugar return to our clinic in 1 week.  He is also unable to work due to his diabetic retinopathy is following with a ophthalmologist.  His blood pressure is running 110s over 60s.  His weight is been controlled with diet and exercise.    Much of this encounter note is an electronic transcription/translation of spoken language to printed text.  The electronic translation of spoken language may permit erroneous, or at times, nonsensical words or phrases to be inadvertently transcribed.  Although I have reviewed the note for such errors, some may still exist.  The following portions of the patient's history were reviewed and updated as appropriate: allergies, current medications, past family history, past medical history, past social history, past surgical history and problem list.    Review of Systems   Constitutional: Negative for fatigue and fever.   HENT: Positive for congestion. Negative for trouble swallowing.    Eyes: Positive for visual disturbance. Negative for discharge.   Respiratory: Negative for choking and shortness of breath.    Cardiovascular: Negative for chest pain and palpitations.   Gastrointestinal: Negative for abdominal pain and blood in stool.   Endocrine: Negative.    Genitourinary: Negative for genital sores and hematuria.   Musculoskeletal: Negative for gait problem and joint swelling.   Skin: Negative for color change, pallor, rash and wound.   Allergic/Immunologic:  Positive for environmental allergies. Negative for immunocompromised state.   Neurological: Negative for facial asymmetry and speech difficulty.   Psychiatric/Behavioral: Negative for hallucinations and suicidal ideas.       Objective   Physical Exam   Constitutional: He is oriented to person, place, and time. He appears well-developed and well-nourished.   HENT:   Head: Normocephalic.   Eyes: Conjunctivae are normal. Pupils are equal, round, and reactive to light.   Neck: Normal range of motion. Neck supple.   Cardiovascular: Normal rate, regular rhythm and normal heart sounds.    Pulmonary/Chest: Effort normal and breath sounds normal.   Abdominal: Soft. Bowel sounds are normal.   Musculoskeletal: Normal range of motion.   Neurological: He is alert and oriented to person, place, and time.   Skin: Skin is warm and dry.   Psychiatric: He has a normal mood and affect. His behavior is normal. Judgment and thought content normal.   Nursing note and vitals reviewed.      Assessment/Plan   Problems Addressed this Visit        Cardiovascular and Mediastinum    Essential hypertension    Relevant Orders    CBC & Differential    Comprehensive Metabolic Panel    Lipid Panel    Hemoglobin A1c       Digestive    Class 2 obesity due to excess calories with serious comorbidity and body mass index (BMI) of 36.0 to 36.9 in adult    Relevant Orders    CBC & Differential    Comprehensive Metabolic Panel    Lipid Panel    Hemoglobin A1c       Endocrine    Type 2 diabetes mellitus without complication, without long-term current use of insulin    Relevant Medications    SITagliptin (JANUVIA) 100 MG tablet    dapagliflozin-metformin HCl ER (XIGDUO XR) 5-1000 MG tablet    Other Relevant Orders    CBC & Differential    Comprehensive Metabolic Panel    Lipid Panel    Hemoglobin A1c    Diabetic retinopathy - Primary    Relevant Medications    SITagliptin (JANUVIA) 100 MG tablet    dapagliflozin-metformin HCl ER (XIGDUO XR) 5-1000 MG tablet     Other Relevant Orders    CBC & Differential    Comprehensive Metabolic Panel    Lipid Panel    Hemoglobin A1c      Other Visit Diagnoses    None.

## 2018-03-22 ENCOUNTER — LAB (OUTPATIENT)
Dept: FAMILY MEDICINE CLINIC | Facility: CLINIC | Age: 33
End: 2018-03-22

## 2018-03-22 DIAGNOSIS — E08.311 DIABETIC RETINOPATHY OF BOTH EYES WITH MACULAR EDEMA ASSOCIATED WITH DIABETES MELLITUS DUE TO UNDERLYING CONDITION, UNSPECIFIED RETINOPATHY SEVERITY (HCC): ICD-10-CM

## 2018-03-22 DIAGNOSIS — IMO0001 CLASS 2 OBESITY DUE TO EXCESS CALORIES WITH SERIOUS COMORBIDITY AND BODY MASS INDEX (BMI) OF 36.0 TO 36.9 IN ADULT: ICD-10-CM

## 2018-03-22 DIAGNOSIS — E11.9 TYPE 2 DIABETES MELLITUS WITHOUT COMPLICATION, WITHOUT LONG-TERM CURRENT USE OF INSULIN (HCC): ICD-10-CM

## 2018-03-22 DIAGNOSIS — I10 ESSENTIAL HYPERTENSION: ICD-10-CM

## 2018-03-22 LAB
ALBUMIN SERPL-MCNC: 3.8 G/DL (ref 3.5–5.2)
ALBUMIN/GLOB SERPL: 1.1 G/DL
ALP SERPL-CCNC: 103 U/L (ref 39–117)
ALT SERPL W P-5'-P-CCNC: 31 U/L (ref 1–41)
ANION GAP SERPL CALCULATED.3IONS-SCNC: 11.7 MMOL/L
AST SERPL-CCNC: 16 U/L (ref 1–40)
BILIRUB SERPL-MCNC: 0.2 MG/DL (ref 0.1–1.2)
BUN BLD-MCNC: 26 MG/DL (ref 6–20)
BUN/CREAT SERPL: 31 (ref 7–25)
CALCIUM SPEC-SCNC: 9.1 MG/DL (ref 8.6–10.5)
CHLORIDE SERPL-SCNC: 102 MMOL/L (ref 98–107)
CHOLEST SERPL-MCNC: 198 MG/DL (ref 0–200)
CO2 SERPL-SCNC: 24.3 MMOL/L (ref 22–29)
CREAT BLD-MCNC: 0.84 MG/DL (ref 0.76–1.27)
ERYTHROCYTE [DISTWIDTH] IN BLOOD BY AUTOMATED COUNT: 12.5 % (ref 4.5–15)
GFR SERPL CREATININE-BSD FRML MDRD: 105 ML/MIN/1.73
GLOBULIN UR ELPH-MCNC: 3.6 GM/DL
GLUCOSE BLD-MCNC: 253 MG/DL (ref 65–99)
HBA1C MFR BLD: 8.31 % (ref 4.8–5.6)
HCT VFR BLD AUTO: 47.7 % (ref 35–60)
HDLC SERPL-MCNC: 46 MG/DL (ref 40–60)
HGB BLD-MCNC: 16.4 G/DL (ref 13.5–18)
LDLC SERPL CALC-MCNC: 132 MG/DL (ref 0–100)
LDLC/HDLC SERPL: 2.87 {RATIO}
LYMPHOCYTES # BLD AUTO: 3.7 10*3/MM3 (ref 1.2–3.4)
LYMPHOCYTES NFR BLD AUTO: 30.1 % (ref 21–51)
MCH RBC QN AUTO: 29.6 PG (ref 26.1–33.1)
MCHC RBC AUTO-ENTMCNC: 34.3 G/DL (ref 33–37)
MCV RBC AUTO: 86.4 FL (ref 80–99)
MONOCYTES # BLD AUTO: 0.5 10*3/MM3 (ref 0.1–0.6)
MONOCYTES NFR BLD AUTO: 4.2 % (ref 2–9)
NEUTROPHILS # BLD AUTO: 8 10*3/MM3 (ref 1.4–6.5)
NEUTROPHILS NFR BLD AUTO: 65.7 % (ref 42–75)
PLATELET # BLD AUTO: 235 10*3/MM3 (ref 150–450)
PMV BLD AUTO: 8.3 FL (ref 7.1–10.5)
POTASSIUM BLD-SCNC: 4.3 MMOL/L (ref 3.5–5.2)
PROT SERPL-MCNC: 7.4 G/DL (ref 6–8.5)
RBC # BLD AUTO: 5.52 10*6/MM3 (ref 4–6)
SODIUM BLD-SCNC: 138 MMOL/L (ref 136–145)
TRIGL SERPL-MCNC: 99 MG/DL (ref 0–150)
VLDLC SERPL-MCNC: 19.8 MG/DL (ref 5–40)
WBC NRBC COR # BLD: 12.2 10*3/MM3 (ref 4.5–10)

## 2018-03-22 PROCEDURE — 83036 HEMOGLOBIN GLYCOSYLATED A1C: CPT | Performed by: INTERNAL MEDICINE

## 2018-03-22 PROCEDURE — 36415 COLL VENOUS BLD VENIPUNCTURE: CPT | Performed by: INTERNAL MEDICINE

## 2018-03-22 PROCEDURE — 80053 COMPREHEN METABOLIC PANEL: CPT | Performed by: INTERNAL MEDICINE

## 2018-03-22 PROCEDURE — 80061 LIPID PANEL: CPT | Performed by: INTERNAL MEDICINE

## 2018-03-22 PROCEDURE — 85025 COMPLETE CBC W/AUTO DIFF WBC: CPT | Performed by: INTERNAL MEDICINE

## 2018-03-26 ENCOUNTER — TELEPHONE (OUTPATIENT)
Dept: FAMILY MEDICINE CLINIC | Facility: CLINIC | Age: 33
End: 2018-03-26

## 2018-03-26 NOTE — TELEPHONE ENCOUNTER
Hemoglobin A1c was 8.3% and elevated cholesterol recommend return to our clinic in 2 weeks and bring all medications

## 2018-03-30 ENCOUNTER — OFFICE VISIT (OUTPATIENT)
Dept: FAMILY MEDICINE CLINIC | Facility: CLINIC | Age: 33
End: 2018-03-30

## 2018-03-30 VITALS
TEMPERATURE: 98.1 F | BODY MASS INDEX: 38.37 KG/M2 | HEIGHT: 74 IN | SYSTOLIC BLOOD PRESSURE: 120 MMHG | OXYGEN SATURATION: 98 % | RESPIRATION RATE: 16 BRPM | WEIGHT: 299 LBS | DIASTOLIC BLOOD PRESSURE: 82 MMHG | HEART RATE: 91 BPM

## 2018-03-30 DIAGNOSIS — E11.9 TYPE 2 DIABETES MELLITUS WITHOUT COMPLICATION, WITHOUT LONG-TERM CURRENT USE OF INSULIN (HCC): Primary | ICD-10-CM

## 2018-03-30 DIAGNOSIS — IMO0001 CLASS 2 OBESITY DUE TO EXCESS CALORIES WITH SERIOUS COMORBIDITY AND BODY MASS INDEX (BMI) OF 36.0 TO 36.9 IN ADULT: ICD-10-CM

## 2018-03-30 DIAGNOSIS — I10 ESSENTIAL HYPERTENSION: ICD-10-CM

## 2018-03-30 PROCEDURE — 99214 OFFICE O/P EST MOD 30 MIN: CPT | Performed by: INTERNAL MEDICINE

## 2018-03-30 RX ORDER — GLIMEPIRIDE 4 MG/1
4 TABLET ORAL
Qty: 60 TABLET | Refills: 3 | Status: SHIPPED | OUTPATIENT
Start: 2018-03-30 | End: 2018-10-10 | Stop reason: SDUPTHER

## 2018-03-30 NOTE — PROGRESS NOTES
Subjective   Osvaldo Welsh is a 32 y.o. male.     History of Present Illness   Patient was seen for diabetes.  His blood sugars running 130s to 140s in the a.m. and 210s to 180 in the PM.  He was on an SGLT 2 with metformin.  He also takes a DPP 4.  He was taken off the SGLT 2 and placed on Amaryl.  Patient states this is what control his blood pressure before.  He will continue his diabetic diet and exercise.  He has seen the eye doctor one month ago.  He will follow-up appointment medications in 1 month.  Her pressures been running 120s over 80s.  He is following his diet and exercise.    Dictated utilizing Dragon dictation. If there are questions or for further clarification, please contact me.   The following portions of the patient's history were reviewed and updated as appropriate: allergies, current medications, past family history, past medical history, past social history, past surgical history and problem list.    Review of Systems   Constitutional: Negative for fatigue and fever.   HENT: Positive for congestion. Negative for trouble swallowing.    Eyes: Negative for discharge and visual disturbance.   Respiratory: Negative for choking and shortness of breath.    Cardiovascular: Negative for chest pain and palpitations.   Gastrointestinal: Negative for abdominal pain and blood in stool.   Endocrine: Negative.    Genitourinary: Negative for genital sores and hematuria.   Musculoskeletal: Negative for gait problem and joint swelling.   Skin: Negative for color change, pallor, rash and wound.   Allergic/Immunologic: Positive for environmental allergies. Negative for immunocompromised state.   Neurological: Negative for facial asymmetry and speech difficulty.   Psychiatric/Behavioral: Negative for hallucinations and suicidal ideas.       Objective   Physical Exam   Constitutional: He is oriented to person, place, and time. He appears well-developed and well-nourished.   HENT:   Head: Normocephalic.   Eyes:  Conjunctivae are normal. Pupils are equal, round, and reactive to light.   Neck: Normal range of motion. Neck supple.   Cardiovascular: Normal rate, regular rhythm and normal heart sounds.    Pulmonary/Chest: Effort normal and breath sounds normal.   Abdominal: Soft. Bowel sounds are normal.   Musculoskeletal: Normal range of motion.   Neurological: He is alert and oriented to person, place, and time.   Skin: Skin is warm and dry.   Psychiatric: He has a normal mood and affect. His behavior is normal. Judgment and thought content normal.   Nursing note and vitals reviewed.      Assessment/Plan   Problems Addressed this Visit        Cardiovascular and Mediastinum    Essential hypertension       Digestive    Class 2 obesity due to excess calories with serious comorbidity and body mass index (BMI) of 36.0 to 36.9 in adult       Endocrine    Type 2 diabetes mellitus without complication, without long-term current use of insulin - Primary    Relevant Medications    glimepiride (AMARYL) 4 MG tablet      Other Visit Diagnoses    None.

## 2018-04-24 ENCOUNTER — TELEPHONE (OUTPATIENT)
Dept: FAMILY MEDICINE CLINIC | Facility: CLINIC | Age: 33
End: 2018-04-24

## 2018-04-24 NOTE — TELEPHONE ENCOUNTER
PATIENT NEEDS A WORK STATEMENT THAT READS  PATIENT IS UNDER DR'S CARE FOR DIABETES, BLOOD PRESSURE AND SIGHT

## 2018-05-07 ENCOUNTER — DOCUMENTATION (OUTPATIENT)
Dept: FAMILY MEDICINE CLINIC | Facility: CLINIC | Age: 33
End: 2018-05-07

## 2018-07-05 RX ORDER — SITAGLIPTIN 100 MG/1
TABLET, FILM COATED ORAL
Qty: 30 TABLET | Refills: 0 | Status: SHIPPED | OUTPATIENT
Start: 2018-07-05 | End: 2018-08-03 | Stop reason: SDUPTHER

## 2018-07-05 RX ORDER — LISINOPRIL 20 MG/1
TABLET ORAL
Qty: 30 TABLET | Refills: 0 | Status: SHIPPED | OUTPATIENT
Start: 2018-07-05 | End: 2018-08-04 | Stop reason: SDUPTHER

## 2018-08-06 RX ORDER — LISINOPRIL 20 MG/1
TABLET ORAL
Qty: 30 TABLET | Refills: 0 | Status: SHIPPED | OUTPATIENT
Start: 2018-08-06 | End: 2018-10-08 | Stop reason: SDUPTHER

## 2018-08-06 RX ORDER — LISINOPRIL 20 MG/1
TABLET ORAL
Qty: 30 TABLET | Refills: 0 | Status: SHIPPED | OUTPATIENT
Start: 2018-08-06 | End: 2018-08-06 | Stop reason: SDUPTHER

## 2018-08-06 RX ORDER — SITAGLIPTIN 100 MG/1
TABLET, FILM COATED ORAL
Qty: 30 TABLET | Refills: 0 | Status: SHIPPED | OUTPATIENT
Start: 2018-08-06 | End: 2018-09-08 | Stop reason: SDUPTHER

## 2018-09-10 RX ORDER — SITAGLIPTIN 100 MG/1
TABLET, FILM COATED ORAL
Qty: 30 TABLET | Refills: 0 | Status: SHIPPED | OUTPATIENT
Start: 2018-09-10 | End: 2018-10-08 | Stop reason: SDUPTHER

## 2018-10-08 ENCOUNTER — TELEPHONE (OUTPATIENT)
Dept: FAMILY MEDICINE CLINIC | Facility: CLINIC | Age: 33
End: 2018-10-08

## 2018-10-08 RX ORDER — LISINOPRIL 20 MG/1
TABLET ORAL
Qty: 30 TABLET | Refills: 0 | Status: SHIPPED | OUTPATIENT
Start: 2018-10-08 | End: 2018-10-10 | Stop reason: SDUPTHER

## 2018-10-08 RX ORDER — SITAGLIPTIN 100 MG/1
TABLET, FILM COATED ORAL
Qty: 30 TABLET | Refills: 0 | Status: SHIPPED | OUTPATIENT
Start: 2018-10-08 | End: 2018-10-10 | Stop reason: SDUPTHER

## 2018-10-10 ENCOUNTER — OFFICE VISIT (OUTPATIENT)
Dept: FAMILY MEDICINE CLINIC | Facility: CLINIC | Age: 33
End: 2018-10-10

## 2018-10-10 ENCOUNTER — TELEPHONE (OUTPATIENT)
Dept: FAMILY MEDICINE CLINIC | Facility: CLINIC | Age: 33
End: 2018-10-10

## 2018-10-10 VITALS
DIASTOLIC BLOOD PRESSURE: 86 MMHG | WEIGHT: 308 LBS | HEIGHT: 74 IN | OXYGEN SATURATION: 97 % | TEMPERATURE: 98.2 F | SYSTOLIC BLOOD PRESSURE: 110 MMHG | BODY MASS INDEX: 39.53 KG/M2 | HEART RATE: 97 BPM

## 2018-10-10 DIAGNOSIS — E78.5 HYPERLIPIDEMIA, UNSPECIFIED HYPERLIPIDEMIA TYPE: ICD-10-CM

## 2018-10-10 DIAGNOSIS — I10 ESSENTIAL HYPERTENSION: ICD-10-CM

## 2018-10-10 DIAGNOSIS — E11.9 TYPE 2 DIABETES MELLITUS WITHOUT COMPLICATION, WITHOUT LONG-TERM CURRENT USE OF INSULIN (HCC): Primary | ICD-10-CM

## 2018-10-10 LAB
ALBUMIN SERPL-MCNC: 4.1 G/DL (ref 3.5–5.2)
ALBUMIN UR-MCNC: 20 MG/L (ref 0–20)
ALBUMIN/GLOB SERPL: 1.1 G/DL
ALP SERPL-CCNC: 111 U/L (ref 39–117)
ALT SERPL W P-5'-P-CCNC: 34 U/L (ref 1–41)
ANION GAP SERPL CALCULATED.3IONS-SCNC: 9.3 MMOL/L
AST SERPL-CCNC: 16 U/L (ref 1–40)
BACTERIA UR QL AUTO: ABNORMAL /HPF
BILIRUB SERPL-MCNC: 0.2 MG/DL (ref 0.1–1.2)
BILIRUB UR QL STRIP: NEGATIVE
BUN BLD-MCNC: 20 MG/DL (ref 6–20)
BUN/CREAT SERPL: 20.8 (ref 7–25)
CALCIUM SPEC-SCNC: 9.6 MG/DL (ref 8.6–10.5)
CHLORIDE SERPL-SCNC: 99 MMOL/L (ref 98–107)
CHOLEST SERPL-MCNC: 204 MG/DL (ref 0–200)
CLARITY UR: CLEAR
CO2 SERPL-SCNC: 27.7 MMOL/L (ref 22–29)
COLOR UR: YELLOW
CREAT BLD-MCNC: 0.96 MG/DL (ref 0.76–1.27)
ERYTHROCYTE [DISTWIDTH] IN BLOOD BY AUTOMATED COUNT: 12.8 % (ref 4.5–15)
GFR SERPL CREATININE-BSD FRML MDRD: 89 ML/MIN/1.73
GLOBULIN UR ELPH-MCNC: 3.7 GM/DL
GLUCOSE BLD-MCNC: 250 MG/DL (ref 65–99)
GLUCOSE UR STRIP-MCNC: ABNORMAL MG/DL
HBA1C MFR BLD: 9.99 % (ref 4.8–5.6)
HCT VFR BLD AUTO: 49.7 % (ref 35–60)
HDLC SERPL-MCNC: 37 MG/DL (ref 40–60)
HGB BLD-MCNC: 16.7 G/DL (ref 13.5–18)
HGB UR QL STRIP.AUTO: NEGATIVE
KETONES UR QL STRIP: NEGATIVE
LDLC SERPL CALC-MCNC: 130 MG/DL (ref 0–100)
LDLC/HDLC SERPL: 3.51 {RATIO}
LEUKOCYTE ESTERASE UR QL STRIP.AUTO: NEGATIVE
LYMPHOCYTES # BLD AUTO: 3.2 10*3/MM3 (ref 1.2–3.4)
LYMPHOCYTES NFR BLD AUTO: 26.8 % (ref 21–51)
MCH RBC QN AUTO: 29.4 PG (ref 26.1–33.1)
MCHC RBC AUTO-ENTMCNC: 33.7 G/DL (ref 33–37)
MCV RBC AUTO: 87.2 FL (ref 80–99)
MONOCYTES # BLD AUTO: 0.5 10*3/MM3 (ref 0.1–0.6)
MONOCYTES NFR BLD AUTO: 3.8 % (ref 2–9)
NEUTROPHILS # BLD AUTO: 8.4 10*3/MM3 (ref 1.4–6.5)
NEUTROPHILS NFR BLD AUTO: 69.4 % (ref 42–75)
NITRITE UR QL STRIP: NEGATIVE
PH UR STRIP.AUTO: 5.5 [PH] (ref 4.6–8)
PLATELET # BLD AUTO: 233 10*3/MM3 (ref 150–450)
PMV BLD AUTO: 8.1 FL (ref 7.1–10.5)
POTASSIUM BLD-SCNC: 4.5 MMOL/L (ref 3.5–5.2)
PROT SERPL-MCNC: 7.8 G/DL (ref 6–8.5)
PROT UR QL STRIP: NEGATIVE
RBC # BLD AUTO: 5.7 10*6/MM3 (ref 4–6)
RBC # UR: ABNORMAL /HPF
REF LAB TEST METHOD: ABNORMAL
SODIUM BLD-SCNC: 136 MMOL/L (ref 136–145)
SP GR UR STRIP: 1.02 (ref 1–1.03)
SQUAMOUS #/AREA URNS HPF: ABNORMAL /HPF
TRIGL SERPL-MCNC: 185 MG/DL (ref 0–150)
UROBILINOGEN UR QL STRIP: ABNORMAL
VLDLC SERPL-MCNC: 37 MG/DL (ref 5–40)
WBC NRBC COR # BLD: 12.1 10*3/MM3 (ref 4.5–10)
WBC UR QL AUTO: ABNORMAL /HPF

## 2018-10-10 PROCEDURE — 99214 OFFICE O/P EST MOD 30 MIN: CPT | Performed by: NURSE PRACTITIONER

## 2018-10-10 PROCEDURE — 85025 COMPLETE CBC W/AUTO DIFF WBC: CPT | Performed by: NURSE PRACTITIONER

## 2018-10-10 PROCEDURE — 80061 LIPID PANEL: CPT | Performed by: NURSE PRACTITIONER

## 2018-10-10 PROCEDURE — 80053 COMPREHEN METABOLIC PANEL: CPT | Performed by: NURSE PRACTITIONER

## 2018-10-10 PROCEDURE — 81001 URINALYSIS AUTO W/SCOPE: CPT | Performed by: NURSE PRACTITIONER

## 2018-10-10 PROCEDURE — 82043 UR ALBUMIN QUANTITATIVE: CPT | Performed by: NURSE PRACTITIONER

## 2018-10-10 PROCEDURE — 83036 HEMOGLOBIN GLYCOSYLATED A1C: CPT | Performed by: NURSE PRACTITIONER

## 2018-10-10 RX ORDER — ATORVASTATIN CALCIUM 10 MG/1
10 TABLET, FILM COATED ORAL NIGHTLY
Qty: 90 TABLET | Refills: 3 | Status: SHIPPED | OUTPATIENT
Start: 2018-10-10 | End: 2019-10-21

## 2018-10-10 RX ORDER — LANCETS
EACH MISCELLANEOUS
Qty: 60 EACH | Refills: 11 | Status: SHIPPED | OUTPATIENT
Start: 2018-10-10 | End: 2021-04-14

## 2018-10-10 RX ORDER — LISINOPRIL 20 MG/1
20 TABLET ORAL DAILY
Qty: 90 TABLET | Refills: 1 | Status: SHIPPED | OUTPATIENT
Start: 2018-10-10 | End: 2019-05-16 | Stop reason: SDUPTHER

## 2018-10-10 RX ORDER — GLIMEPIRIDE 4 MG/1
4 TABLET ORAL 2 TIMES DAILY
Qty: 180 TABLET | Refills: 1 | Status: SHIPPED | OUTPATIENT
Start: 2018-10-10 | End: 2019-05-16 | Stop reason: SDUPTHER

## 2018-10-10 NOTE — PROGRESS NOTES
Subjective   Osvaldo Welsh is a 33 y.o. male.     History of Present Illness   Here to FU on DM2 on januvia 100 mg, glimepiride 4 mg AM with last A1C 8.3 03/18, checking BS average 150-200, prev metformin caused fatigue, admits not following as strict DM diet, but some exercise, weight gain 9 lbs since last 03/18, saw ophtho yesterday with B retinal blindness, DM retinopathy B eye improving but optic nerve damage permanent, no feet pain, request more test strips and lancets today  With chronic HTN on lisinopril 20 mg, ASA 81 mg, no CP dizziness HA LE edema   With chronic chol prev recommended lipitor 10 mg HS but never started with last  03/18, fasting for labs     The following portions of the patient's history were reviewed and updated as appropriate: allergies, current medications, past family history, past medical history, past social history, past surgical history and problem list.    Review of Systems   Constitutional: Negative for fever.   Eyes: Positive for visual disturbance.   Respiratory: Negative for cough, shortness of breath and wheezing.    Cardiovascular: Negative for chest pain, palpitations and leg swelling.   Endocrine: Negative for cold intolerance, heat intolerance, polydipsia, polyphagia and polyuria.   Neurological: Negative for dizziness and headaches.   Psychiatric/Behavioral: Negative for agitation, behavioral problems, confusion, decreased concentration, dysphoric mood, hallucinations, self-injury, sleep disturbance and suicidal ideas. The patient is not nervous/anxious and is not hyperactive.    All other systems reviewed and are negative.      Objective   Physical Exam   Constitutional: He is oriented to person, place, and time. He appears well-developed and well-nourished.   HENT:   Head: Normocephalic and atraumatic.   Eyes: Pupils are equal, round, and reactive to light. Conjunctivae and EOM are normal.   Cardiovascular: Normal rate, regular rhythm and normal heart sounds.     Pulmonary/Chest: Effort normal and breath sounds normal.   Musculoskeletal: Normal range of motion.    Osvaldo had a diabetic foot exam performed (sensation intact, pulses strong, well hydrated, no ulcers or fungal toenails) today.  Neurological: He is alert and oriented to person, place, and time.   Skin: Skin is warm and dry.   Psychiatric: He has a normal mood and affect. His behavior is normal. Judgment and thought content normal.   Vitals reviewed.      Assessment/Plan   Osvaldo was seen today for med refill.    Diagnoses and all orders for this visit:    Type 2 diabetes mellitus without complication, without long-term current use of insulin (CMS/[facility])  -     CBC & Differential  -     Comprehensive Metabolic Panel  -     Lipid Panel  -     Urinalysis With Microscopic - Urine, Clean Catch  -     Hemoglobin A1c  -     MicroAlbumin, Urine, Random - Urine, Clean Catch  -     CBC Auto Differential  -     Urinalysis without microscopic (no culture) - Urine, Clean Catch  -     Urinalysis, Microscopic Only - Urine, Clean Catch    Essential hypertension  -     Comprehensive Metabolic Panel  -     Lipid Panel    Hyperlipidemia, unspecified hyperlipidemia type  -     Comprehensive Metabolic Panel  -     Lipid Panel    Other orders  -     lisinopril (PRINIVIL,ZESTRIL) 20 MG tablet; Take 1 tablet by mouth Daily.  -     SITagliptin (JANUVIA) 100 MG tablet; Take 1 tablet by mouth Daily.  -     Lancets (ACCU-CHEK MULTICLIX) lancets; Dx e11.9 use to check BS BID ok to substitute formulary preferred  -     glucose blood (ACCU-CHEK MANAV PLUS) test strip; Dx e11.9 use to check BS BID ok to substitute formulary preferred  -     glimepiride (AMARYL) 4 MG tablet; Take 1 tablet by mouth 2 (Two) Times a Day.  -     atorvastatin (LIPITOR) 10 MG tablet; Take 1 tablet by mouth Every Night.    check labs and call with results, refill all chronic dz meds and increase glimepiride 4 mg BID, Enc healthy DM diet and regular exercise for wt  loss and BS control, check BP and BS at home, refill test strips and lancets, DM foot exam today, UTD DM dilated eye exam, start lipitor 10 mg HS, defer flu vaccine today

## 2018-10-10 NOTE — TELEPHONE ENCOUNTER
BS elevated 250, A1C has incresed from 8.3 to 9.9, showing poor control. Need to increase glimepiride 4 mg BID and work on DM diet and exercise to get better BS control. We can recheck all labs in 3 mo FU apt.  Kidney and liver normal. Chol still elevated, need to start lipitor 10 mg HS already at pharmacy for you and we can recheck in 3 mo FU apt fasting labs.

## 2018-10-10 NOTE — PATIENT INSTRUCTIONS
check labs and call with results, refill all chronic dz meds and increase glimepiride 4 mg BID, Enc healthy DM diet and regular exercise for wt loss and BS control, check BP and BS at home, refill test strips and lancets, DM foot exam today, UTD DM dilated eye exam, start lipitor 10 mg HS, defer flu vaccine today

## 2019-01-15 RX ORDER — ASPIRIN 81 MG/1
TABLET ORAL
Qty: 30 TABLET | Refills: 0 | Status: SHIPPED | OUTPATIENT
Start: 2019-01-15 | End: 2019-02-14 | Stop reason: SDUPTHER

## 2019-01-29 ENCOUNTER — HOSPITAL ENCOUNTER (EMERGENCY)
Facility: HOSPITAL | Age: 34
Discharge: HOME | End: 2019-01-29
Attending: EMERGENCY MEDICINE | Admitting: EMERGENCY MEDICINE

## 2019-01-29 ENCOUNTER — APPOINTMENT (OUTPATIENT)
Dept: GENERAL RADIOLOGY | Facility: HOSPITAL | Age: 34
End: 2019-01-29

## 2019-01-29 ENCOUNTER — APPOINTMENT (OUTPATIENT)
Dept: CARDIOLOGY | Facility: HOSPITAL | Age: 34
End: 2019-01-29
Attending: EMERGENCY MEDICINE

## 2019-01-29 VITALS
SYSTOLIC BLOOD PRESSURE: 154 MMHG | HEART RATE: 91 BPM | DIASTOLIC BLOOD PRESSURE: 99 MMHG | OXYGEN SATURATION: 97 % | BODY MASS INDEX: 39.78 KG/M2 | TEMPERATURE: 97 F | WEIGHT: 310 LBS | RESPIRATION RATE: 18 BRPM | HEIGHT: 74 IN

## 2019-01-29 DIAGNOSIS — M79.661 PAIN AND SWELLING OF RIGHT LOWER LEG: ICD-10-CM

## 2019-01-29 DIAGNOSIS — M79.89 PAIN AND SWELLING OF RIGHT LOWER LEG: ICD-10-CM

## 2019-01-29 DIAGNOSIS — S92.334A NONDISPLACED FRACTURE OF THIRD METATARSAL BONE, RIGHT FOOT, INITIAL ENCOUNTER FOR CLOSED FRACTURE: Primary | ICD-10-CM

## 2019-01-29 LAB
BH CV LOWER VASCULAR LEFT COMMON FEMORAL AUGMENT: NORMAL
BH CV LOWER VASCULAR LEFT COMMON FEMORAL COMPETENT: NORMAL
BH CV LOWER VASCULAR LEFT COMMON FEMORAL COMPRESS: NORMAL
BH CV LOWER VASCULAR LEFT COMMON FEMORAL PHASIC: NORMAL
BH CV LOWER VASCULAR LEFT COMMON FEMORAL SPONT: NORMAL
BH CV LOWER VASCULAR RIGHT COMMON FEMORAL AUGMENT: NORMAL
BH CV LOWER VASCULAR RIGHT COMMON FEMORAL COMPETENT: NORMAL
BH CV LOWER VASCULAR RIGHT COMMON FEMORAL COMPRESS: NORMAL
BH CV LOWER VASCULAR RIGHT COMMON FEMORAL PHASIC: NORMAL
BH CV LOWER VASCULAR RIGHT COMMON FEMORAL SPONT: NORMAL
BH CV LOWER VASCULAR RIGHT DISTAL FEMORAL COMPRESS: NORMAL
BH CV LOWER VASCULAR RIGHT GASTRONEMIUS COMPRESS: NORMAL
BH CV LOWER VASCULAR RIGHT GREATER SAPH AK COMPRESS: NORMAL
BH CV LOWER VASCULAR RIGHT GREATER SAPH BK COMPRESS: NORMAL
BH CV LOWER VASCULAR RIGHT MID FEMORAL AUGMENT: NORMAL
BH CV LOWER VASCULAR RIGHT MID FEMORAL COMPETENT: NORMAL
BH CV LOWER VASCULAR RIGHT MID FEMORAL COMPRESS: NORMAL
BH CV LOWER VASCULAR RIGHT MID FEMORAL PHASIC: NORMAL
BH CV LOWER VASCULAR RIGHT MID FEMORAL SPONT: NORMAL
BH CV LOWER VASCULAR RIGHT PERONEAL COMPRESS: NORMAL
BH CV LOWER VASCULAR RIGHT POPLITEAL AUGMENT: NORMAL
BH CV LOWER VASCULAR RIGHT POPLITEAL COMPETENT: NORMAL
BH CV LOWER VASCULAR RIGHT POPLITEAL COMPRESS: NORMAL
BH CV LOWER VASCULAR RIGHT POPLITEAL PHASIC: NORMAL
BH CV LOWER VASCULAR RIGHT POPLITEAL SPONT: NORMAL
BH CV LOWER VASCULAR RIGHT POSTERIOR TIBIAL COMPRESS: NORMAL
BH CV LOWER VASCULAR RIGHT PROXIMAL FEMORAL COMPRESS: NORMAL
BH CV LOWER VASCULAR RIGHT SAPHENOFEMORAL JUNCTION AUGMENT: NORMAL
BH CV LOWER VASCULAR RIGHT SAPHENOFEMORAL JUNCTION COMPETENT: NORMAL
BH CV LOWER VASCULAR RIGHT SAPHENOFEMORAL JUNCTION COMPRESS: NORMAL
BH CV LOWER VASCULAR RIGHT SAPHENOFEMORAL JUNCTION PHASIC: NORMAL
BH CV LOWER VASCULAR RIGHT SAPHENOFEMORAL JUNCTION SPONT: NORMAL

## 2019-01-29 PROCEDURE — 73610 X-RAY EXAM OF ANKLE: CPT

## 2019-01-29 PROCEDURE — 99283 EMERGENCY DEPT VISIT LOW MDM: CPT

## 2019-01-29 PROCEDURE — 73630 X-RAY EXAM OF FOOT: CPT

## 2019-01-29 PROCEDURE — 93971 EXTREMITY STUDY: CPT

## 2019-01-31 ENCOUNTER — CONSULT (OUTPATIENT)
Dept: ORTHOPEDIC SURGERY | Facility: CLINIC | Age: 34
End: 2019-01-31

## 2019-01-31 VITALS — HEIGHT: 74 IN | BODY MASS INDEX: 39.78 KG/M2 | TEMPERATURE: 98.3 F | WEIGHT: 310 LBS

## 2019-01-31 DIAGNOSIS — S93.401A SPRAIN OF RIGHT ANKLE, UNSPECIFIED LIGAMENT, INITIAL ENCOUNTER: ICD-10-CM

## 2019-01-31 DIAGNOSIS — S86.301A INJURY OF PERONEAL TENDON OF RIGHT FOOT, INITIAL ENCOUNTER: Primary | ICD-10-CM

## 2019-01-31 DIAGNOSIS — S92.334A CLOSED NONDISPLACED FRACTURE OF THIRD METATARSAL BONE OF RIGHT FOOT, INITIAL ENCOUNTER: ICD-10-CM

## 2019-01-31 PROCEDURE — 99214 OFFICE O/P EST MOD 30 MIN: CPT | Performed by: ORTHOPAEDIC SURGERY

## 2019-02-04 ENCOUNTER — TELEPHONE (OUTPATIENT)
Dept: ORTHOPEDIC SURGERY | Facility: CLINIC | Age: 34
End: 2019-02-04

## 2019-02-04 ENCOUNTER — TELEPHONE (OUTPATIENT)
Dept: FAMILY MEDICINE CLINIC | Facility: CLINIC | Age: 34
End: 2019-02-04

## 2019-02-04 RX ORDER — TRAMADOL HYDROCHLORIDE 50 MG/1
TABLET ORAL
Qty: 40 TABLET | Refills: 0 | OUTPATIENT
Start: 2019-02-04 | End: 2019-07-07

## 2019-02-04 NOTE — TELEPHONE ENCOUNTER
Spoke with patients mother letting her know that the rx is ready for pickup at the [facility] office.

## 2019-02-04 NOTE — TELEPHONE ENCOUNTER
I spoke with patient's mother he was unavailable but she is authorized for release on paperwork.  She still having quite a bit of pain in the foot despite use of the crutches and boot but he has to continue working.  Reviewed with her and like to hold off on any narcotic pain medications at this time he's been using over-the-counter anti-inflammatories.  We'll try tramadol 50 g 1 by mouth every 6 hours as needed for pain #40.  They're working on scheduling the studies.

## 2019-02-09 ENCOUNTER — HOSPITAL ENCOUNTER (OUTPATIENT)
Dept: MRI IMAGING | Facility: HOSPITAL | Age: 34
Discharge: HOME | End: 2019-02-09
Attending: ORTHOPAEDIC SURGERY | Admitting: ORTHOPAEDIC SURGERY

## 2019-02-09 ENCOUNTER — HOSPITAL ENCOUNTER (OUTPATIENT)
Dept: CT IMAGING | Facility: HOSPITAL | Age: 34
Discharge: HOME | End: 2019-02-09
Attending: ORTHOPAEDIC SURGERY

## 2019-02-09 DIAGNOSIS — S92.334A CLOSED NONDISPLACED FRACTURE OF THIRD METATARSAL BONE OF RIGHT FOOT, INITIAL ENCOUNTER: ICD-10-CM

## 2019-02-09 DIAGNOSIS — S93.401A SPRAIN OF RIGHT ANKLE, UNSPECIFIED LIGAMENT, INITIAL ENCOUNTER: ICD-10-CM

## 2019-02-09 DIAGNOSIS — S86.301A INJURY OF PERONEAL TENDON OF RIGHT FOOT, INITIAL ENCOUNTER: ICD-10-CM

## 2019-02-09 PROCEDURE — 73700 CT LOWER EXTREMITY W/O DYE: CPT

## 2019-02-09 PROCEDURE — 73721 MRI JNT OF LWR EXTRE W/O DYE: CPT

## 2019-02-09 PROCEDURE — 76377 3D RENDER W/INTRP POSTPROCES: CPT

## 2019-02-14 ENCOUNTER — OFFICE VISIT (OUTPATIENT)
Dept: ORTHOPEDIC SURGERY | Facility: CLINIC | Age: 34
End: 2019-02-14

## 2019-02-14 VITALS — HEIGHT: 74 IN | TEMPERATURE: 98.3 F | WEIGHT: 310 LBS | BODY MASS INDEX: 39.78 KG/M2

## 2019-02-14 DIAGNOSIS — M76.72 PERONEAL TENDONITIS, LEFT: ICD-10-CM

## 2019-02-14 DIAGNOSIS — S92.334A CLOSED NONDISPLACED FRACTURE OF THIRD METATARSAL BONE OF RIGHT FOOT, INITIAL ENCOUNTER: ICD-10-CM

## 2019-02-14 DIAGNOSIS — S93.401D SPRAIN OF RIGHT ANKLE, UNSPECIFIED LIGAMENT, SUBSEQUENT ENCOUNTER: Primary | ICD-10-CM

## 2019-02-14 DIAGNOSIS — R93.7 BONE MARROW EDEMA: ICD-10-CM

## 2019-02-14 DIAGNOSIS — M72.2 PLANTAR FASCIITIS: ICD-10-CM

## 2019-02-14 DIAGNOSIS — S92.214A CLOSED NONDISPLACED FRACTURE OF CUBOID OF RIGHT FOOT, INITIAL ENCOUNTER: ICD-10-CM

## 2019-02-14 PROCEDURE — 99214 OFFICE O/P EST MOD 30 MIN: CPT | Performed by: ORTHOPAEDIC SURGERY

## 2019-02-14 PROCEDURE — 28470 CLTX METATARSAL FX WO MNP EA: CPT | Performed by: ORTHOPAEDIC SURGERY

## 2019-02-14 PROCEDURE — 28450 TX TARSAL B1 FX W/O MNPJ EA: CPT | Performed by: ORTHOPAEDIC SURGERY

## 2019-02-14 RX ORDER — ASPIRIN 81 MG/1
TABLET ORAL
Qty: 30 TABLET | Refills: 11 | Status: SHIPPED | OUTPATIENT
Start: 2019-02-14

## 2019-02-14 NOTE — PROGRESS NOTES
"Foot/Ankle Follow Up      Patient: Osvaldo Welsh    YOB: 1985 33 y.o. male    Chief Complaints: Foot and ankle hurt but getting better    History of Present Illness: Patient was seen on on 1/31/2019.  This was 4-6 weeks after an injury where he was walking down an incline and thought that he heard someone call his name and twisted awkwardly and fell and felt a pop in the bottom of the right foot and pain in his lateral ankle.    X-rays showed a third metatarsal fracture and he was sent for MRI of the ankle to evaluate for ligamentous and tendon injury as well as CT scan to evaluate for other occult midfoot injuries.    He was fitted with compression hose which helped significantly with his swelling.  He still gets moderate amount of aching stabbing pain and a feeling of someone \"pouring water over the lateral aspect of his ankle\" and also some moderate discomfort in the midfoot especially when he placed his foot up on the edge of the toilet to dry it after showering.    He has had multiple sprains of the ankle in the past but was without specific complaints prior to this injury  HPI    ROS: ankle pain, foot pain  Past Medical History:   Diagnosis Date   • Asthma    • Diabetes (CMS/[facility])    • Hyperlipidemia    • Hypertension    • Optic nerve hemorrhage, left        Physical Exam:   Vitals:    02/14/19 1110   Temp: 98.3 °F (36.8 °C)   Weight: (!) 141 kg (310 lb)   Height: 188 cm (74\")     Well developed with normal mood.  He is with his mother.  Moderate discomfort of the anterolateral ligamentous structures but no anterior drawer.  Nontender over the medial ankle.  Mild discomfort along the peroneal tendons but no exacerbation with resisted eversion or subluxation.  Moderate discomfort of the calcaneal cuboid joint and dorsum of the midfoot.      Radiology: MRI of the right ankle and CT of the right foot performed on 2/9/2019 were reviewed.  The CT scan shows an essentially nondisplaced intra-articular " fracture of the base of the third metatarsal as well as a transversely oriented fracture through the proximal metaphysis 1 cm distal to the joint.  There is also an impaction fracture of the distal dorsal cuboid and some buckling along the dorsal aspect of the base of the fourth metatarsal.  There is a spur over the dorsum of the base of the second metatarsal without fracture.  There is a peroneal ossification and chronic ossification along the course of the ATFL and CFL and spurring along the anterior distal syndesmosis.  There is spur formation at the Achilles insertion and the plantar calcaneus    MRI showed chronic tear or avulsion of the ATFL with chronic ossification and again spur formation of the anterior syndesmosis.  Mild distal Achilles tendinopathy and trace fluid in the retrocalcaneal bursa.  Mild fluid within the posterior tib and peroneal tendon sheaths.  Peroneus longus was enlarged posterior to the lateral malleolus but then appears somewhat thinned as it coursed lateral and plantar to the calcaneal cuboid joint possibly a mild partial tear but no retraction.  Brevis was intact.  Bone marrow edema was noted within the navicular cuneiforms cuboid second third and fourth metatarsals and a partial tear at the origin of the plantar fascia      Assessment/Plan: 1.  Right third metatarsal base fracture  2.  Right dorsal cuboid impaction fracture  3.  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  Right chronic spurring over the anterior aspect of the syndesmosis  5.  Right peroneus longus tendinopathy in the retromalleolar area with thinning inferolaterally along the proximal perineum  6.  Right bone marrow edema navicular, cuneiforms, cuboid, second third and fourth metatarsals  7.  Right mild Achilles tendinopathy with insertional spur  8.  Right plantar fasciitis with partial tear at the origin of the medial band  9.  Right posterior tib and peroneal tenosynovitis    I had a long  "discussion with he and his mother that I would not recommend any surgical treatment at this time for any of these conditions and they are in agreement with that.  He will continue with his compression hose.  He does have a history of diabetes and understands to watch his skin very carefully although he is grossly sensate light touch throughout the right foot today.  He may do 50% weightbearing with his boot and crutches and will begin range of motion exercises for the ankle as it \"feels stiff\".    I will see him back in 3 weeks x-rays of his right foot and ankle  "

## 2019-04-08 ENCOUNTER — OFFICE VISIT (OUTPATIENT)
Dept: ORTHOPEDIC SURGERY | Facility: CLINIC | Age: 34
End: 2019-04-08

## 2019-04-08 VITALS — TEMPERATURE: 97.4 F | WEIGHT: 310 LBS | HEIGHT: 74 IN | BODY MASS INDEX: 39.78 KG/M2

## 2019-04-08 DIAGNOSIS — S92.334A CLOSED NONDISPLACED FRACTURE OF THIRD METATARSAL BONE OF RIGHT FOOT, INITIAL ENCOUNTER: ICD-10-CM

## 2019-04-08 DIAGNOSIS — S86.301D INJURY OF PERONEAL TENDON OF RIGHT FOOT, SUBSEQUENT ENCOUNTER: ICD-10-CM

## 2019-04-08 DIAGNOSIS — R93.7 BONE MARROW EDEMA: ICD-10-CM

## 2019-04-08 DIAGNOSIS — S92.214D CLOSED NONDISPLACED FRACTURE OF CUBOID OF RIGHT FOOT WITH ROUTINE HEALING, SUBSEQUENT ENCOUNTER: ICD-10-CM

## 2019-04-08 DIAGNOSIS — S93.401S SPRAIN OF RIGHT ANKLE, UNSPECIFIED LIGAMENT, SEQUELA: ICD-10-CM

## 2019-04-08 DIAGNOSIS — S92.252D CLOSED DISPLACED FRACTURE OF NAVICULAR BONE OF LEFT FOOT WITH ROUTINE HEALING, SUBSEQUENT ENCOUNTER: ICD-10-CM

## 2019-04-08 DIAGNOSIS — M79.671 PAIN IN RIGHT FOOT: Primary | ICD-10-CM

## 2019-04-08 PROCEDURE — 73630 X-RAY EXAM OF FOOT: CPT | Performed by: ORTHOPAEDIC SURGERY

## 2019-04-08 PROCEDURE — 99213 OFFICE O/P EST LOW 20 MIN: CPT | Performed by: ORTHOPAEDIC SURGERY

## 2019-04-08 NOTE — PROGRESS NOTES
"Ankle Follow Up      Patient: Osvaldo Welsh    YOB: 1985 33 y.o. male    Chief Complaints: Ankle and foot are getting better.    History of Present Illness: Patient was seen on on 1/31/2019.  This was 4-6 weeks after an injury where he was walking down an incline and thought that he heard someone call his name and twisted awkwardly and fell and felt a pop in the bottom of the right foot and pain in his lateral ankle.    He was last seen on 2/14/2019 for review of CT scan and MRI with injuries as outlined below but nothing indicated for operative treatment.  He was instructed to use his boot and crutches for 50% weightbearing and begin range of motion exercises and to follow-up in 3 weeks.  He missed 2 appointments and is seen back today for the first time stating that his pain is better but he still a little sore mainly in the anterolateral aspect of the ankle and dorsal laterally as well as some into the plantar midfoot with an occasional burning feeling.  He is actually been full weightbearing in his boot for the last 2 weeks as he has been improved from a pain standpoint  HPI    ROS: ankle pain, foot pain  Past Medical History:   Diagnosis Date   • Asthma    • Diabetes (CMS/HCC)    • Hyperlipidemia    • Hypertension    • Optic nerve hemorrhage, left        Physical Exam:   Vitals:    04/08/19 1056   Temp: 97.4 °F (36.3 °C)   TempSrc: Temporal   Weight: (!) 141 kg (310 lb)   Height: 188 cm (74\")     Well developed with normal mood.  On exam is with his mother.  There is slight tenderness to palpation of the anterolateral ligamentous structures but no gross instability.  Mild discomfort in the dorsal lateral midfoot and nontender over the dorsum of the midfoot with some slight tenderness palpation of the plantar midfoot he is able to flex and extend his toes and ankle well.      Radiology: 3 views of the right foot and 2 views of the right ankle ordered to evaluate pain reviewed and compared to " previous x-rays on the [facility] system.  These are without change.  Talus is well seated within the mortise there appears to be some questionable chronic ossific fragment distal to the distal fibula and elongated posterior process of the talus as well as plantar and posterior calcaneal spurring.  There is also an elongated medial navicular no obvious fracture evident at the cuboid and third metatarsal base fracture appears to be healing without change in alignment.  There is no malalignment to the midfoot      Assessment/Plan:  1.  Right third metatarsal base fracture  2.  Right dorsal cuboid impaction fracture  3.  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  Right chronic spurring over the anterior aspect of the syndesmosis  5.  Right peroneus longus tendinopathy in the retromalleolar area with thinning inferolaterally as it courses adjacent to the calcaneocuboid joint consistent with mild partial tear but no retraction  6.  Right bone marrow edema navicular, cuneiforms, cuboid, second third and fourth metatarsals  7.  Right mild Achilles tendinopathy with insertional spur  8.  Right plantar fasciitis with partial tear at the origin of the medial band  9.  Right posterior tib and peroneal tenosynovitis    I am encouraged that overall he is improving.  He will continue weightbearing with his boot for another 2 weeks at which time he then may wean out of the boot into an athletic shoe around the house for 7 to 10 days and if he remains without pain he may then start weaning out of the boot altogether into an athletic shoe.    Let him start physical therapy weightbearing as tolerated for stretching strengthening and mobilization and if anything worsens to let me know otherwise I will see him back in 4 weeks x-rays of his right foot and ankle

## 2019-05-17 RX ORDER — SITAGLIPTIN 100 MG/1
TABLET, FILM COATED ORAL
Qty: 30 TABLET | Refills: 0 | Status: SHIPPED | OUTPATIENT
Start: 2019-05-17 | End: 2019-06-17 | Stop reason: SDUPTHER

## 2019-05-17 RX ORDER — GLIMEPIRIDE 4 MG/1
TABLET ORAL
Qty: 60 TABLET | Refills: 0 | Status: SHIPPED | OUTPATIENT
Start: 2019-05-17 | End: 2019-06-17 | Stop reason: SDUPTHER

## 2019-05-17 RX ORDER — LISINOPRIL 20 MG/1
TABLET ORAL
Qty: 30 TABLET | Refills: 0 | Status: SHIPPED | OUTPATIENT
Start: 2019-05-17 | End: 2019-11-30

## 2019-06-04 ENCOUNTER — TELEPHONE (OUTPATIENT)
Dept: ORTHOPEDIC SURGERY | Facility: CLINIC | Age: 34
End: 2019-06-04

## 2019-06-04 NOTE — TELEPHONE ENCOUNTER
"I spoke with patient's mother she said for several weeks patient has had increased swelling and discoloration about the right ankle but no new injury of which she is aware he not had any fevers, chills chest pain or shortness of breath and there is no redness to the area.  She does state that his toes are all \"cold\".    He missed his last appointment and she thinks it was due to transportation issues and then never rescheduled.    I recommended that he get back into the compression hose that he used previously get back in his boot and limit any weightbearing.    Due to the \"coldness\" in his toes I recommended that she take him to the emergency room for evaluation for any vascular compromise or blood clots.    I will see him towards the end of next week as this is been going on for several weeks but if anything worsens in the interim she will let me know.  She appreciated the call  "

## 2019-06-13 ENCOUNTER — HOSPITAL ENCOUNTER (OUTPATIENT)
Dept: CARDIOLOGY | Facility: HOSPITAL | Age: 34
Discharge: HOME | End: 2019-06-13
Admitting: ORTHOPAEDIC SURGERY

## 2019-06-13 ENCOUNTER — TELEPHONE (OUTPATIENT)
Dept: ORTHOPEDIC SURGERY | Facility: CLINIC | Age: 34
End: 2019-06-13

## 2019-06-13 ENCOUNTER — OFFICE VISIT (OUTPATIENT)
Dept: ORTHOPEDIC SURGERY | Facility: CLINIC | Age: 34
End: 2019-06-13

## 2019-06-13 VITALS — HEIGHT: 74 IN | BODY MASS INDEX: 39.78 KG/M2 | WEIGHT: 310 LBS | TEMPERATURE: 98.2 F

## 2019-06-13 DIAGNOSIS — Z09 FRACTURE FOLLOW-UP: Primary | ICD-10-CM

## 2019-06-13 DIAGNOSIS — M79.661 RIGHT CALF PAIN: ICD-10-CM

## 2019-06-13 DIAGNOSIS — S92.334D CLOSED NONDISPLACED FRACTURE OF THIRD METATARSAL BONE OF RIGHT FOOT WITH ROUTINE HEALING, SUBSEQUENT ENCOUNTER: ICD-10-CM

## 2019-06-13 DIAGNOSIS — S92.214D CLOSED NONDISPLACED FRACTURE OF CUBOID OF RIGHT FOOT WITH ROUTINE HEALING, SUBSEQUENT ENCOUNTER: ICD-10-CM

## 2019-06-13 DIAGNOSIS — S86.301D INJURY OF PERONEAL TENDON OF RIGHT FOOT, SUBSEQUENT ENCOUNTER: ICD-10-CM

## 2019-06-13 DIAGNOSIS — M72.2 PLANTAR FASCIITIS: ICD-10-CM

## 2019-06-13 LAB
BH CV LOW VAS RIGHT POSTERIOR TIBIAL VESSEL: 1
BH CV LOWER VASCULAR LEFT COMMON FEMORAL AUGMENT: NORMAL
BH CV LOWER VASCULAR LEFT COMMON FEMORAL COMPETENT: NORMAL
BH CV LOWER VASCULAR LEFT COMMON FEMORAL COMPRESS: NORMAL
BH CV LOWER VASCULAR LEFT COMMON FEMORAL PHASIC: NORMAL
BH CV LOWER VASCULAR LEFT COMMON FEMORAL SPONT: NORMAL
BH CV LOWER VASCULAR RIGHT COMMON FEMORAL AUGMENT: NORMAL
BH CV LOWER VASCULAR RIGHT COMMON FEMORAL COMPETENT: NORMAL
BH CV LOWER VASCULAR RIGHT COMMON FEMORAL COMPRESS: NORMAL
BH CV LOWER VASCULAR RIGHT COMMON FEMORAL PHASIC: NORMAL
BH CV LOWER VASCULAR RIGHT COMMON FEMORAL SPONT: NORMAL
BH CV LOWER VASCULAR RIGHT DISTAL FEMORAL COMPRESS: NORMAL
BH CV LOWER VASCULAR RIGHT GASTRONEMIUS COMPRESS: NORMAL
BH CV LOWER VASCULAR RIGHT GREATER SAPH AK COMPRESS: NORMAL
BH CV LOWER VASCULAR RIGHT GREATER SAPH BK COMPRESS: NORMAL
BH CV LOWER VASCULAR RIGHT LESSER SAPH COMPRESS: NORMAL
BH CV LOWER VASCULAR RIGHT MID FEMORAL AUGMENT: NORMAL
BH CV LOWER VASCULAR RIGHT MID FEMORAL COMPETENT: NORMAL
BH CV LOWER VASCULAR RIGHT MID FEMORAL COMPRESS: NORMAL
BH CV LOWER VASCULAR RIGHT MID FEMORAL PHASIC: NORMAL
BH CV LOWER VASCULAR RIGHT MID FEMORAL SPONT: NORMAL
BH CV LOWER VASCULAR RIGHT PERONEAL COMPRESS: NORMAL
BH CV LOWER VASCULAR RIGHT POPLITEAL AUGMENT: NORMAL
BH CV LOWER VASCULAR RIGHT POPLITEAL COMPETENT: NORMAL
BH CV LOWER VASCULAR RIGHT POPLITEAL COMPRESS: NORMAL
BH CV LOWER VASCULAR RIGHT POPLITEAL PHASIC: NORMAL
BH CV LOWER VASCULAR RIGHT POPLITEAL SPONT: NORMAL
BH CV LOWER VASCULAR RIGHT POSTERIOR TIBIAL COMPRESS: NORMAL
BH CV LOWER VASCULAR RIGHT POSTERIOR TIBIAL THROMBUS: NORMAL
BH CV LOWER VASCULAR RIGHT PROXIMAL FEMORAL COMPRESS: NORMAL
BH CV LOWER VASCULAR RIGHT SAPHENOFEMORAL JUNCTION AUGMENT: NORMAL
BH CV LOWER VASCULAR RIGHT SAPHENOFEMORAL JUNCTION COMPETENT: NORMAL
BH CV LOWER VASCULAR RIGHT SAPHENOFEMORAL JUNCTION COMPRESS: NORMAL
BH CV LOWER VASCULAR RIGHT SAPHENOFEMORAL JUNCTION PHASIC: NORMAL
BH CV LOWER VASCULAR RIGHT SAPHENOFEMORAL JUNCTION SPONT: NORMAL

## 2019-06-13 PROCEDURE — 73610 X-RAY EXAM OF ANKLE: CPT | Performed by: ORTHOPAEDIC SURGERY

## 2019-06-13 PROCEDURE — 73620 X-RAY EXAM OF FOOT: CPT | Performed by: ORTHOPAEDIC SURGERY

## 2019-06-13 PROCEDURE — 99213 OFFICE O/P EST LOW 20 MIN: CPT | Performed by: ORTHOPAEDIC SURGERY

## 2019-06-13 PROCEDURE — 93971 EXTREMITY STUDY: CPT

## 2019-06-13 NOTE — TELEPHONE ENCOUNTER
Call returned to patient's mother.  She was calling to see if fractures were healed and if he needs to continue fracture boot.  Have advised her that the fractures are healing and in good position, but not completely healed.  He does need to continue his boot until seen in the office.  AMB

## 2019-06-13 NOTE — PROGRESS NOTES
6/13/19 Rt. LEV duplex preliminary findings show an acute right calf DVT with no extension into the popliteal vein. Preliminary report given to Dr. Vargas per hold/call orders. Patient told he will receive follow up communication from [facility] and was released per Dr. Vargas's instruction.

## 2019-06-13 NOTE — TELEPHONE ENCOUNTER
Preliminary results of his Doppler done today are positive for right calf vein thrombosis.  I discussed with both patient and his mother about the results of the Doppler.  Will start the patient on Eliquis.  He understands that he will be on this for at least 6weeks and possibly up to 12 weeks.  Patient presently is on aspirin 81 mg and I am asked him to hold that while he is on the Eliquis.  Also have instructed the patient to avoid taking ibuprofen or Aleve or any other over-the-counter nonsteroidal anti-inflammatory medication while on Eliquis.  Patient can take Tylenol for headache or pain.  I have advised him that he can be up ambulatory maintaining his partial weightbearing restriction however would recommend that he avoid having his leg hanging in a dependent position for long periods of time.  Recommend that he keep his leg elevated when sitting and to be aggressive with ankle pumps.  Patient does have compression stockings and have recommended that he wear one on his right lower extremity during the day but to remove them at night.  We have also discussed the increased risk of bleeding while on anticoagulation therapy.  I have E prescribed a new prescription to [facility]'s pharmacy at [phone] for Eliquis 5 mg, #98, directions are to take 2 tablets p.o. twice daily x1 week and then 1 tablet p.o. twice daily.  Have left it open for the patient to call if he has any other questions or concerns per Dr. Vargas

## 2019-06-17 RX ORDER — GLIMEPIRIDE 4 MG/1
TABLET ORAL
Qty: 60 TABLET | Refills: 0 | Status: SHIPPED | OUTPATIENT
Start: 2019-06-17 | End: 2019-10-17

## 2019-06-17 RX ORDER — SITAGLIPTIN 100 MG/1
TABLET, FILM COATED ORAL
Qty: 30 TABLET | Refills: 0 | Status: SHIPPED | OUTPATIENT
Start: 2019-06-17 | End: 2019-10-17

## 2019-06-18 ENCOUNTER — TRANSCRIBE ORDERS (OUTPATIENT)
Dept: ADMINISTRATIVE | Facility: HOSPITAL | Age: 34
End: 2019-06-18

## 2019-06-18 DIAGNOSIS — G60.9 HEREDITARY AND IDIOPATHIC PERIPHERAL NEUROPATHY: ICD-10-CM

## 2019-06-18 DIAGNOSIS — G60.9 HEREDITARY AND IDIOPATHIC NEUROPATHY: Primary | ICD-10-CM

## 2019-06-26 ENCOUNTER — TRANSCRIBE ORDERS (OUTPATIENT)
Dept: ADMINISTRATIVE | Facility: HOSPITAL | Age: 34
End: 2019-06-26

## 2019-06-26 DIAGNOSIS — S92.211G: Primary | ICD-10-CM

## 2019-07-01 ENCOUNTER — TELEPHONE (OUTPATIENT)
Dept: ORTHOPEDIC SURGERY | Facility: CLINIC | Age: 34
End: 2019-07-01

## 2019-07-01 NOTE — TELEPHONE ENCOUNTER
Call returned to the patient's mother.  Advised her that he will be on the Eliquis for at least 6 weeks possibly 12weeks his 6-week Doppler results.  He is now about 2-1/2 to 3 weeks post calf vein thrombosis.  He does have a refill on his occasion.  Have advised his mother that if he is completed the first prescription that she does need to go ahead and get the refill.  She states a repeat Doppler is scheduled for July 12.  Will determine at that time if the patient is okay to take off the Eliquis or if he needs to stay on an additional 6 weeks

## 2019-07-03 ENCOUNTER — TELEPHONE (OUTPATIENT)
Dept: ORTHOPEDIC SURGERY | Facility: CLINIC | Age: 34
End: 2019-07-03

## 2019-07-05 ENCOUNTER — APPOINTMENT (OUTPATIENT)
Dept: MRI IMAGING | Facility: HOSPITAL | Age: 34
End: 2019-07-05

## 2019-07-06 ENCOUNTER — HOSPITAL ENCOUNTER (EMERGENCY)
Facility: HOSPITAL | Age: 34
Discharge: HOME | End: 2019-07-07
Attending: EMERGENCY MEDICINE | Admitting: EMERGENCY MEDICINE

## 2019-07-06 DIAGNOSIS — R07.89 ATYPICAL CHEST PAIN: Primary | ICD-10-CM

## 2019-07-06 LAB — GLUCOSE BLDC GLUCOMTR-MCNC: 268 MG/DL (ref 70–130)

## 2019-07-06 PROCEDURE — 82962 GLUCOSE BLOOD TEST: CPT

## 2019-07-06 PROCEDURE — 93005 ELECTROCARDIOGRAM TRACING: CPT

## 2019-07-06 PROCEDURE — 93005 ELECTROCARDIOGRAM TRACING: CPT | Performed by: EMERGENCY MEDICINE

## 2019-07-06 PROCEDURE — 93010 ELECTROCARDIOGRAM REPORT: CPT | Performed by: INTERNAL MEDICINE

## 2019-07-06 PROCEDURE — 99284 EMERGENCY DEPT VISIT MOD MDM: CPT

## 2019-07-06 RX ORDER — SODIUM CHLORIDE 0.9 % (FLUSH) 0.9 %
10 SYRINGE (ML) INJECTION AS NEEDED
Status: DISCONTINUED | OUTPATIENT
Start: 2019-07-06 | End: 2019-07-07 | Stop reason: HOSPADM

## 2019-07-06 RX ORDER — ASPIRIN 325 MG
325 TABLET ORAL ONCE
Status: DISCONTINUED | OUTPATIENT
Start: 2019-07-06 | End: 2019-07-07

## 2019-07-07 ENCOUNTER — APPOINTMENT (OUTPATIENT)
Dept: GENERAL RADIOLOGY | Facility: HOSPITAL | Age: 34
End: 2019-07-07

## 2019-07-07 ENCOUNTER — APPOINTMENT (OUTPATIENT)
Dept: CT IMAGING | Facility: HOSPITAL | Age: 34
End: 2019-07-07

## 2019-07-07 VITALS
WEIGHT: 314.9 LBS | OXYGEN SATURATION: 94 % | BODY MASS INDEX: 40.41 KG/M2 | SYSTOLIC BLOOD PRESSURE: 115 MMHG | RESPIRATION RATE: 17 BRPM | HEART RATE: 97 BPM | DIASTOLIC BLOOD PRESSURE: 73 MMHG | HEIGHT: 74 IN | TEMPERATURE: 98.1 F

## 2019-07-07 LAB
ALBUMIN SERPL-MCNC: 3.8 G/DL (ref 3.5–5.2)
ALBUMIN/GLOB SERPL: 1 G/DL
ALP SERPL-CCNC: 117 U/L (ref 39–117)
ALT SERPL W P-5'-P-CCNC: 31 U/L (ref 1–41)
ANION GAP SERPL CALCULATED.3IONS-SCNC: 13.4 MMOL/L (ref 5–15)
AST SERPL-CCNC: 17 U/L (ref 1–40)
BASOPHILS # BLD AUTO: 0.08 10*3/MM3 (ref 0–0.2)
BASOPHILS NFR BLD AUTO: 0.5 % (ref 0–1.5)
BILIRUB SERPL-MCNC: 0.2 MG/DL (ref 0.2–1.2)
BUN BLD-MCNC: 25 MG/DL (ref 6–20)
BUN/CREAT SERPL: 23.8 (ref 7–25)
CALCIUM SPEC-SCNC: 9.5 MG/DL (ref 8.6–10.5)
CHLORIDE SERPL-SCNC: 97 MMOL/L (ref 98–107)
CO2 SERPL-SCNC: 22.6 MMOL/L (ref 22–29)
CREAT BLD-MCNC: 1.05 MG/DL (ref 0.76–1.27)
DEPRECATED RDW RBC AUTO: 36.4 FL (ref 37–54)
EOSINOPHIL # BLD AUTO: 0.17 10*3/MM3 (ref 0–0.4)
EOSINOPHIL NFR BLD AUTO: 1.1 % (ref 0.3–6.2)
ERYTHROCYTE [DISTWIDTH] IN BLOOD BY AUTOMATED COUNT: 12 % (ref 12.3–15.4)
GFR SERPL CREATININE-BSD FRML MDRD: 80 ML/MIN/1.73
GLOBULIN UR ELPH-MCNC: 3.8 GM/DL
GLUCOSE BLD-MCNC: 316 MG/DL (ref 65–99)
HCT VFR BLD AUTO: 45.1 % (ref 37.5–51)
HGB BLD-MCNC: 15.1 G/DL (ref 13–17.7)
HOLD SPECIMEN: NORMAL
HOLD SPECIMEN: NORMAL
IMM GRANULOCYTES # BLD AUTO: 0.08 10*3/MM3 (ref 0–0.05)
IMM GRANULOCYTES NFR BLD AUTO: 0.5 % (ref 0–0.5)
INR PPP: 1.03 (ref 0.9–1.1)
LYMPHOCYTES # BLD AUTO: 4.61 10*3/MM3 (ref 0.7–3.1)
LYMPHOCYTES NFR BLD AUTO: 30.3 % (ref 19.6–45.3)
MCH RBC QN AUTO: 28.2 PG (ref 26.6–33)
MCHC RBC AUTO-ENTMCNC: 33.5 G/DL (ref 31.5–35.7)
MCV RBC AUTO: 84.3 FL (ref 79–97)
MONOCYTES # BLD AUTO: 1 10*3/MM3 (ref 0.1–0.9)
MONOCYTES NFR BLD AUTO: 6.6 % (ref 5–12)
NEUTROPHILS # BLD AUTO: 9.27 10*3/MM3 (ref 1.7–7)
NEUTROPHILS NFR BLD AUTO: 61 % (ref 42.7–76)
NRBC BLD AUTO-RTO: 0 /100 WBC (ref 0–0.2)
PLATELET # BLD AUTO: 244 10*3/MM3 (ref 140–450)
PMV BLD AUTO: 10.4 FL (ref 6–12)
POTASSIUM BLD-SCNC: 4.1 MMOL/L (ref 3.5–5.2)
PROT SERPL-MCNC: 7.6 G/DL (ref 6–8.5)
PROTHROMBIN TIME: 13.2 SECONDS (ref 11.7–14.2)
RBC # BLD AUTO: 5.35 10*6/MM3 (ref 4.14–5.8)
SODIUM BLD-SCNC: 133 MMOL/L (ref 136–145)
TROPONIN T SERPL-MCNC: <0.01 NG/ML (ref 0–0.03)
WBC NRBC COR # BLD: 15.21 10*3/MM3 (ref 3.4–10.8)
WHOLE BLOOD HOLD SPECIMEN: NORMAL
WHOLE BLOOD HOLD SPECIMEN: NORMAL

## 2019-07-07 PROCEDURE — 84484 ASSAY OF TROPONIN QUANT: CPT | Performed by: EMERGENCY MEDICINE

## 2019-07-07 PROCEDURE — 71275 CT ANGIOGRAPHY CHEST: CPT

## 2019-07-07 PROCEDURE — 0 IOPAMIDOL PER 1 ML: Performed by: EMERGENCY MEDICINE

## 2019-07-07 PROCEDURE — 80053 COMPREHEN METABOLIC PANEL: CPT | Performed by: EMERGENCY MEDICINE

## 2019-07-07 PROCEDURE — 71046 X-RAY EXAM CHEST 2 VIEWS: CPT

## 2019-07-07 PROCEDURE — 85610 PROTHROMBIN TIME: CPT | Performed by: EMERGENCY MEDICINE

## 2019-07-07 PROCEDURE — 85025 COMPLETE CBC W/AUTO DIFF WBC: CPT | Performed by: EMERGENCY MEDICINE

## 2019-07-07 RX ORDER — HYDROCODONE BITARTRATE AND ACETAMINOPHEN 5; 325 MG/1; MG/1
1 TABLET ORAL EVERY 6 HOURS PRN
Qty: 15 TABLET | Refills: 0 | Status: SHIPPED | OUTPATIENT
Start: 2019-07-07 | End: 2019-10-03

## 2019-07-07 RX ADMIN — IOPAMIDOL 95 ML: 755 INJECTION, SOLUTION INTRAVENOUS at 01:05

## 2019-07-07 NOTE — ED PROVIDER NOTES
" EMERGENCY DEPARTMENT ENCOUNTER    CHIEF COMPLAINT  Chief Complaint: Chest pain  History given by: Patient  History limited by: None  Room Number: 15/15  PMD: Provider, No Known      HPI:  Pt is a 33 y.o. male who presents complaining of intermittent \"sharp\" chest pain that began earlier tonight. Pt also c/o diaphoresis and SOA, with onset of pain, that is currently resolved, but denies any SOA. Hx of DVT. He is on Eliquis.    Duration: Began earlier tonight  Onset: Gradual  Timing: Intermittent  Location: Chest  Quality: \"sharp\" pain  Intensity/Severity: Moderate  Progression: Unchanged  Associated Symptoms: Diaphoresis and SOA, that is currently resolved  Previous Episodes: Hx of DVT.  Treatment before arrival: He is on Eliquis.    PAST MEDICAL HISTORY  Active Ambulatory Problems     Diagnosis Date Noted   • Closed displaced fracture of navicular bone of left foot 08/02/2016   • Sprain 08/02/2016   • Closed nondisplaced fracture of cuboid bone of right foot 08/15/2016   • Sprain of right ankle 09/08/2016   • Arthritis of ankle 03/03/2017   • Peroneal tendonitis 03/03/2017   • Ankle impingement syndrome 03/03/2017   • Left ankle pain 03/03/2017   • Tendonitis, Achilles, left 03/27/2017   • Type 2 diabetes mellitus without complication, without long-term current use of insulin (CMS/[facility]) 12/20/2017   • Essential hypertension 12/20/2017   • Class 2 obesity due to excess calories with serious comorbidity and body mass index (BMI) of 36.0 to 36.9 in adult 12/20/2017   • Diabetic retinopathy (CMS/[facility]) 03/21/2018   • Injury of peroneal tendon of right foot 01/31/2019   • Closed nondisplaced fracture of third metatarsal bone of right foot 01/31/2019   • Peroneal tendonitis, left 02/14/2019   • Plantar fasciitis 02/14/2019   • Bone marrow edema 02/14/2019     Resolved Ambulatory Problems     Diagnosis Date Noted   • No Resolved Ambulatory Problems     Past Medical History:   Diagnosis Date   • Asthma    • Diabetes (CMS/[facility]) " "   • Hyperlipidemia    • Hypertension    • Optic nerve hemorrhage, left        PAST SURGICAL HISTORY  Past Surgical History:   Procedure Laterality Date   • TONSILLECTOMY AND ADENOIDECTOMY         FAMILY HISTORY  Family History   Problem Relation Age of Onset   • Diabetes Father    • Hypertension Maternal Grandmother    • Hypertension Maternal Grandfather    • Diabetes Paternal Grandmother    • Diabetes Paternal Grandfather        SOCIAL HISTORY  Social History     Socioeconomic History   • Marital status: Single     Spouse name: Not on file   • Number of children: Not on file   • Years of education: Not on file   • Highest education level: Not on file   Tobacco Use   • Smoking status: Never Smoker   • Smokeless tobacco: Never Used   Substance and Sexual Activity   • Alcohol use: No   • Drug use: No   • Sexual activity: Defer       ALLERGIES  Patient has no known allergies.    REVIEW OF SYSTEMS  Review of Systems   Constitutional: Positive for diaphoresis (with onset of pain, currently resolved). Negative for activity change, appetite change and fever.   HENT: Negative for congestion and sore throat.    Eyes: Negative.    Respiratory: Positive for shortness of breath (with onset of pain, currently resolved). Negative for cough.    Cardiovascular: Positive for chest pain (intermittent \"sharp\" pain). Negative for leg swelling.   Gastrointestinal: Negative for abdominal pain, diarrhea and vomiting.   Endocrine: Negative.    Genitourinary: Negative for decreased urine volume and dysuria.   Musculoskeletal: Negative for neck pain.   Skin: Negative for rash and wound.   Allergic/Immunologic: Negative.    Neurological: Negative for weakness, numbness and headaches.   Hematological: Negative.    Psychiatric/Behavioral: Negative.    All other systems reviewed and are negative.      PHYSICAL EXAM  ED Triage Vitals   Temp Heart Rate Resp BP SpO2   07/06/19 2335 07/06/19 2335 07/06/19 2335 07/06/19 2346 07/06/19 2335   97.7 °F " (36.5 °C) 119 16 131/88 98 %      Temp src Heart Rate Source Patient Position BP Location FiO2 (%)   07/06/19 2335 07/06/19 2335 -- -- --   Tympanic Monitor          Physical Exam   Constitutional: He is oriented to person, place, and time. No distress.   HENT:   Head: Normocephalic and atraumatic.   Eyes: EOM are normal. Pupils are equal, round, and reactive to light.   Neck: Normal range of motion. Neck supple.   Cardiovascular: Normal rate, regular rhythm and normal heart sounds.   Pulmonary/Chest: Effort normal and breath sounds normal. No respiratory distress.   Abdominal: Soft. There is no tenderness. There is no rebound and no guarding.   Abd is benign.   Musculoskeletal: Normal range of motion. He exhibits no edema.   R leg is in a walking boot.   Neurological: He is alert and oriented to person, place, and time. He has normal sensation and normal strength.   Skin: Skin is warm and dry.   Psychiatric: Mood and affect normal.   Nursing note and vitals reviewed.      LAB RESULTS  Lab Results (last 24 hours)     Procedure Component Value Units Date/Time    POC Glucose Once [[id number]]  (Abnormal) Collected:  07/06/19 2351    Specimen:  Blood Updated:  07/06/19 2353     Glucose 268 mg/dL     CBC & Differential [[id number]] Collected:  07/07/19 0001    Specimen:  Blood Updated:  07/07/19 0013    Narrative:       The following orders were created for panel order CBC & Differential.  Procedure                               Abnormality         Status                     ---------                               -----------         ------                     CBC Auto Differential[[id number]]        Abnormal            Final result                 Please view results for these tests on the individual orders.    Comprehensive Metabolic Panel [[id number]]  (Abnormal) Collected:  07/07/19 0001    Specimen:  Blood Updated:  07/07/19 0035     Glucose 316 mg/dL      BUN 25 mg/dL      Creatinine 1.05 mg/dL      Sodium 133 mmol/L       Potassium 4.1 mmol/L      Chloride 97 mmol/L      CO2 22.6 mmol/L      Calcium 9.5 mg/dL      Total Protein 7.6 g/dL      Albumin 3.80 g/dL      ALT (SGPT) 31 U/L      AST (SGOT) 17 U/L      Alkaline Phosphatase 117 U/L      Total Bilirubin 0.2 mg/dL      eGFR Non African Amer 80 mL/min/1.73      Globulin 3.8 gm/dL      A/G Ratio 1.0 g/dL      BUN/Creatinine Ratio 23.8     Anion Gap 13.4 mmol/L     Narrative:       GFR Normal >60  Chronic Kidney Disease <60  Kidney Failure <15    Troponin [[id number]]  (Normal) Collected:  07/07/19 0001    Specimen:  Blood Updated:  07/07/19 0035     Troponin T <0.010 ng/mL     Narrative:       Troponin T Reference Range:  <= 0.03 ng/mL-   Negative for AMI  >0.03 ng/mL-     Abnormal for myocardial necrosis.  Clinicians would have to utilize clinical acumen, EKG, Troponin and serial changes to determine if it is an Acute Myocardial Infarction or myocardial injury due to an underlying chronic condition.     CBC Auto Differential [[id number]]  (Abnormal) Collected:  07/07/19 0001    Specimen:  Blood Updated:  07/07/19 0013     WBC 15.21 10*3/mm3      RBC 5.35 10*6/mm3      Hemoglobin 15.1 g/dL      Hematocrit 45.1 %      MCV 84.3 fL      MCH 28.2 pg      MCHC 33.5 g/dL      RDW 12.0 %      RDW-SD 36.4 fl      MPV 10.4 fL      Platelets 244 10*3/mm3      Neutrophil % 61.0 %      Lymphocyte % 30.3 %      Monocyte % 6.6 %      Eosinophil % 1.1 %      Basophil % 0.5 %      Immature Grans % 0.5 %      Neutrophils, Absolute 9.27 10*3/mm3      Lymphocytes, Absolute 4.61 10*3/mm3      Monocytes, Absolute 1.00 10*3/mm3      Eosinophils, Absolute 0.17 10*3/mm3      Basophils, Absolute 0.08 10*3/mm3      Immature Grans, Absolute 0.08 10*3/mm3      nRBC 0.0 /100 WBC     Protime-INR [[id number]]  (Normal) Collected:  07/07/19 0001    Specimen:  Blood Updated:  07/07/19 0023     Protime 13.2 Seconds      INR 1.03          I ordered the above labs and reviewed the results    RADIOLOGY  CT Angiogram  Chest With Contrast   Final Result       1. Exam is significantly degraded by poor timing of the contrast bolus.   No large central pulmonary thromboembolus is seen.   2. Dilatation of the aortic root and ascending thoracic aorta.   Descending thoracic aorta measures within normal size limits.   3. No acute pulmonary infiltrates are seen. Patient does have some   prominent mediastinal and hilar lymph nodes of uncertain clinical   significance. Short-term CT follow-up in 3-6 months is suggested to   document resolution or stability.       Radiation dose reduction techniques were utilized, including automated   exposure control and exposure modulation based on body size.       This report was finalized on 7/7/2019 1:21 AM by Dr. Allegra Corrigan M.D.          XR Chest 2 View   Final Result   Overall diminished lung volumes. Otherwise, no acute findings.       This report was finalized on 7/7/2019 12:45 AM by Dr. Allegra Corrigan M.D.               I ordered the above noted radiological studies. Interpreted by radiologist. Reviewed by me in PACS.    PROCEDURES  Procedures  EKG          EKG time: 2341  Rhythm/Rate: Sinus tachycardia rate 100  P waves and [place]: Nml P  QRS, axis: Nml QRS   ST and T waves: No acute ST changes     Interpreted Contemporaneously by me, independently viewed  No old for comparison.    PROGRESS AND CONSULTS     2335 Ordered EKG for further evaluation.    2354 Ordered CBC, troponin, CMP, POC glucose, INR, and CXR for further evaluation. Ordered ASA.    0011 Ordered CTA chest for further evaluation.    0141 Rechecked with pt and informed him of the results of his labs, CXR, EKG, and CTA chest. Plan to discharge with [place]. He has been advised to have follow up with his PCP for the prominent mediastinal and hilar lymph nodes seen in his CTA. Pt understands and agrees with the plan, all questions answered.    MEDICAL DECISION MAKING  Results were reviewed/discussed with the patient and they were  also made aware of online access. Pt also made aware that some labs, such as cultures, will not be resulted during ER visit and follow up with PMD is necessary.     MDM  Number of Diagnoses or Management Options  Atypical chest pain:      Amount and/or Complexity of Data Reviewed  Clinical lab tests: ordered and reviewed  Tests in the radiology section of CPT®: ordered and reviewed  Tests in the medicine section of CPT®: ordered and reviewed (See procedure notes for EKG.)  Decide to obtain previous medical records or to obtain history from someone other than the patient: yes    Patient Progress  Patient progress: stable         DIAGNOSIS  Final diagnoses:   Atypical chest pain       DISPOSITION  DISCHARGE    Patient discharged in stable condition.    Reviewed implications of results, diagnosis, meds, responsibility to follow up, warning signs and symptoms of possible worsening, potential complications and reasons to return to ER, including new or worsening sxs.    Patient/Family voiced understanding of above instructions.    Discussed plan for discharge, as there is no emergent indication for admission. Patient referred to primary care provider for BP management due to today's BP. Pt/family is agreeable and understands need for follow up and repeat testing.  Pt is aware that discharge does not mean that nothing is wrong but it indicates no emergency is present that requires admission and they must continue care with follow-up as given below or physician of their choice.     FOLLOW-UP  Your Family Doctor    Schedule an appointment as soon as possible for a visit       [facility]  [facility] [address] [phone]             Medication List      New Prescriptions    HYDROcodone-acetaminophen 5-325 MG per tablet  Commonly known as:  NORCO  Take 1 tablet by mouth Every 6 (Six) Hours As Needed for Moderate Pain .        Stop    traMADol 50 MG tablet  Commonly known as:   ULTRAM        Latest Documented Vital Signs:  As of 1:45 AM  BP- 122/66 HR- 93 Temp- 97.7 °F (36.5 °C) (Tympanic) O2 sat- 94%    --  Documentation assistance provided by mark Dejesus for Dr. Baldwin.  Information recorded by the scribe was done at my direction and has been verified and validated by me.     Santa Dejesus  07/07/19 0145       rAnol Baldwin MD  07/07/19 0620

## 2019-07-09 ENCOUNTER — TELEPHONE (OUTPATIENT)
Dept: ORTHOPEDIC SURGERY | Facility: CLINIC | Age: 34
End: 2019-07-09

## 2019-07-10 ENCOUNTER — HOSPITAL ENCOUNTER (OUTPATIENT)
Dept: MRI IMAGING | Facility: HOSPITAL | Age: 34
Discharge: HOME | End: 2019-07-10
Admitting: NURSE PRACTITIONER

## 2019-07-10 ENCOUNTER — HOSPITAL ENCOUNTER (OUTPATIENT)
Dept: MRI IMAGING | Facility: HOSPITAL | Age: 34
Discharge: HOME | End: 2019-07-10

## 2019-07-10 DIAGNOSIS — S92.211G: ICD-10-CM

## 2019-07-10 PROCEDURE — 73721 MRI JNT OF LWR EXTRE W/O DYE: CPT

## 2019-07-10 PROCEDURE — 73718 MRI LOWER EXTREMITY W/O DYE: CPT

## 2019-07-10 RX ORDER — TRAMADOL HYDROCHLORIDE 50 MG/1
TABLET ORAL
Qty: 40 TABLET | Refills: 0 | Status: SHIPPED | OUTPATIENT
Start: 2019-07-10 | End: 2019-10-17

## 2019-07-11 ENCOUNTER — HOSPITAL ENCOUNTER (OUTPATIENT)
Dept: INFUSION THERAPY | Facility: HOSPITAL | Age: 34
Discharge: HOME | End: 2019-07-11
Admitting: PSYCHIATRY & NEUROLOGY

## 2019-07-11 DIAGNOSIS — G60.9 HEREDITARY AND IDIOPATHIC NEUROPATHY: ICD-10-CM

## 2019-07-11 PROCEDURE — 95909 NRV CNDJ TST 5-6 STUDIES: CPT

## 2019-07-11 PROCEDURE — 95909 NRV CNDJ TST 5-6 STUDIES: CPT | Performed by: PSYCHIATRY & NEUROLOGY

## 2019-07-11 PROCEDURE — 95886 MUSC TEST DONE W/N TEST COMP: CPT | Performed by: PSYCHIATRY & NEUROLOGY

## 2019-07-11 PROCEDURE — 95886 MUSC TEST DONE W/N TEST COMP: CPT

## 2019-07-11 NOTE — PROCEDURES
EMG REPORT    Indication: Pain and numbness of both lower extremities    Findings: Left peroneal motor study showed a normal distal latency small amplitude response and velocity of 26.3 m/s.  Right peroneal and tibial motor studies showed no reliable response with either proximal or distal stimulation.  Left tibial motor study showed a distal latency of 5.7 ms with a small amplitude response and prolonged F-wave latency of 75.17 ms.  Right median motor study showed a prolonged distal latency of 5.8 ms with normal velocity and amplitude.    Concentric needle EMG of bilateral vastus lateralis, vastus medialis, anterior tibialis, peroneus and gastrocnemius muscles showed no abnormality.    Impression: Nerve conduction studies show evidence of widespread severe slowing of velocity and F-wave latencies as well as reduction of amplitude.  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  Needle EMG of both lower extremities failed to show evidence of superimposed lumbosacral radiculopathy.       Virgil Malin M.D.

## 2019-07-18 ENCOUNTER — OFFICE VISIT (OUTPATIENT)
Dept: ORTHOPEDIC SURGERY | Facility: CLINIC | Age: 34
End: 2019-07-18

## 2019-07-18 VITALS — TEMPERATURE: 98 F | BODY MASS INDEX: 40.3 KG/M2 | WEIGHT: 314 LBS | HEIGHT: 74 IN

## 2019-07-18 DIAGNOSIS — S92.214G CLOSED NONDISPLACED FRACTURE OF CUBOID OF RIGHT FOOT WITH DELAYED HEALING, SUBSEQUENT ENCOUNTER: ICD-10-CM

## 2019-07-18 DIAGNOSIS — M65.28 CALCIFIC ACHILLES TENDONITIS: ICD-10-CM

## 2019-07-18 DIAGNOSIS — I82.4Z1 ACUTE DEEP VEIN THROMBOSIS (DVT) OF DISTAL VEIN OF RIGHT LOWER EXTREMITY (HCC): ICD-10-CM

## 2019-07-18 DIAGNOSIS — S86.311D PERONEAL TENDON TEAR, RIGHT, SUBSEQUENT ENCOUNTER: ICD-10-CM

## 2019-07-18 DIAGNOSIS — L97.521 SKIN ULCER OF LEFT FOOT, LIMITED TO BREAKDOWN OF SKIN (HCC): Primary | ICD-10-CM

## 2019-07-18 DIAGNOSIS — R93.7 BONE MARROW EDEMA: ICD-10-CM

## 2019-07-18 DIAGNOSIS — S93.401D SPRAIN OF RIGHT ANKLE, UNSPECIFIED LIGAMENT, SUBSEQUENT ENCOUNTER: ICD-10-CM

## 2019-07-18 DIAGNOSIS — M62.9 NONTRAUMATIC TEAR OF PLANTAR FASCIA: ICD-10-CM

## 2019-07-18 DIAGNOSIS — S92.334G CLOSED NONDISPLACED FRACTURE OF THIRD METATARSAL BONE OF RIGHT FOOT WITH DELAYED HEALING, SUBSEQUENT ENCOUNTER: ICD-10-CM

## 2019-07-18 DIAGNOSIS — G63 POLYNEUROPATHY ASSOCIATED WITH UNDERLYING DISEASE (HCC): ICD-10-CM

## 2019-07-18 PROCEDURE — 99214 OFFICE O/P EST MOD 30 MIN: CPT | Performed by: ORTHOPAEDIC SURGERY

## 2019-07-18 RX ORDER — LISINOPRIL AND HYDROCHLOROTHIAZIDE 25; 20 MG/1; MG/1
TABLET ORAL
COMMUNITY
Start: 2019-07-16 | End: 2019-10-17

## 2019-07-18 NOTE — PROGRESS NOTES
Foot Follow Up      Patient: Osvaldo Welsh    YOB: 1985 33 y.o. male    Chief Complaints: Foot getting better    History of Present Illness: Patient was last seen on 6/13/2019 and is been followed extensively in the past for his right ankle as detailed in previous notes.  He was seen on 4/8/2019 at which time his right ankle pain and midfoot pain with fractures was improving and had done quite well until May 29 when he walked extensively around a car show at which time his ankle and foot became bothering him more with increased swelling.      On 6/13/2019 he was seen with complaints of pain in the lateral aspect of the ankle and arch of the foot.    At that time he was instructed to get back into his boot and resume use of compression hose.  He was sent for MRI of his right foot and ankle as well as a Doppler.  He has seen his PCP and was also sent for a nerve conduction study.    He was also sent for Doppler which showed acute calf DVT and has been on Eliquis since    He has been taking Voltaren SR 75 mg twice daily.    He is been doing partial weightbearing only with his crutches and said his right foot and leg are feeling much better gets still a mild ache in the inferolateral aspect of the right foot more so than dorsally and some pain in the plantar arch.    On 7/7/2019 he had some pain in his chest and he went to the emergency room.  This is now improved to some degree and he did not have evidence of pulmonary embolism.    He is also noted over the last week or so that he had recurrence of some fissuring in his left foot under the plantar forefoot that he said he is gotten intermittently and usually resolves.  He has not had any drainage or erythema to this area  HPI    ROS: Foot pain, numbness and tingling  Past Medical History:   Diagnosis Date   • Asthma    • Diabetes (CMS/HCC)    • Hyperlipidemia    • Hypertension    • Optic nerve hemorrhage, left      Physical Exam:   Vitals:    07/18/19  "1525   Temp: 98 °F (36.7 °C)   Weight: (!) 142 kg (314 lb)   Height: 188 cm (74\")     Well developed with normal mood.  Examination of the right foot there is mild swelling there is diminished sensation plantarly more so than dorsally but is able to accurately localize.  There is mild discomfort over the dorsum of the midfoot laterally more so than dorsal centrally.  Mild discomfort in the plantar arch minimal discomfort along the peroneal tendons and Achilles.    Left foot shows area of superficial fissuring along the plantar aspect of the forefoot along the third and fourth metatarsal.  This appears superficial with some hypertrophic callus and no erythema warmth drainage or fluctuance.      Radiology: Doppler dated 6/13/2019 showed acute DVT in the posterior tib vein    MRI films and report of the right foot and ankle showed a large tear in the medial plantar aponeurosis that is partial as well as spurring at the insertion of the Achilles and a partial tear of the peroneus longus at the level of the calcaneocuboid joint which are all unchanged.    There is been no interval change in the ossification and other posttraumatic deformity around the ATFL and CFL.    There is now more pronounced bone marrow edema and anterior calcaneal process and along the lateral aspect of the calcaneal body without any fracture plane evident.  There is also extensive bone marrow edema in the cuboid with interval fragmentation of the distal cuboid at the articulation of the fourth metatarsal base.    Also bone marrow edema in the navicular and in the cuneiforms but no osteochondral defect is identified around the navicular.    There is bone marrow edema in the cuneiforms cuboid second third and fourth metatarsal bases more so than the fifth metatarsal base there is also been development of some bony fragmentation on the distal articular surface of the cuboid and at the cortex and trabecular space of the third metatarsal base.    EMG " nerve conduction study report dated 7/11/2019 showed severe diffuse polyneuropathy with mixed axonal demyelinating characteristics.      Assessment/Plan: 1.  Right foot interval progression of bony deformity around the midfoot with extensive periarticular bone marrow edema at the midfoot    2.  Increased fragmentation of the distal medial cuboid and third metatarsal base  3.  Chronic partial tear of the medial plantar fascia and spurring at the insertion of the Achilles  4.  Chronic partial tear of the peroneus longus at the level of the calcaneocuboid joint  5.  Ossification of the ATFL and CFL  6.  Right posterior tib calf DVT  7.  Left foot superficial fissuring with slight ulceration or plantar wart    I had a long discussion with patient and his mother today certainly is concern for potential developing Charcot foot given these findings as well as the peripheral neuropathy.    We will keep him partial weightbearing only and I want to get a CT scan of his foot for further evaluation of the bony alignment.    Regarding his DVT he will continue with Eliquis and understands to have a Doppler done a week from today and will determine treatment from there.    Regarding his CT findings of his chest recommend that they follow-up with her primary care physician for this regarding the changes in his aorta and hilar lymphadenopathy but thankfully did not see any sign of pulmonary embolism    Regarding his left foot counseled him not to pull in the skin off of this or to pick at it and keep it clean and keep keep this bandage so it does not rub in his shoe and offload it with a felt donut for now.    I gave him the name and number of Dr. Coles for further evaluation of this to see if it is a plantar wart or if it is an ulceration treatment going forward with it as far as any type of offloading orthotic or topical treatments.    I will see him back in approximately 4 weeks with x-rays of his left foot

## 2019-07-29 ENCOUNTER — HOSPITAL ENCOUNTER (OUTPATIENT)
Dept: CT IMAGING | Facility: HOSPITAL | Age: 34
Discharge: HOME | End: 2019-07-29
Admitting: ORTHOPAEDIC SURGERY

## 2019-07-29 ENCOUNTER — HOSPITAL ENCOUNTER (OUTPATIENT)
Dept: CARDIOLOGY | Facility: HOSPITAL | Age: 34
Discharge: HOME | End: 2019-07-29

## 2019-07-29 ENCOUNTER — HOSPITAL ENCOUNTER (EMERGENCY)
Facility: HOSPITAL | Age: 34
Discharge: HOME | End: 2019-07-29
Attending: EMERGENCY MEDICINE | Admitting: EMERGENCY MEDICINE

## 2019-07-29 VITALS
WEIGHT: 308 LBS | SYSTOLIC BLOOD PRESSURE: 116 MMHG | RESPIRATION RATE: 17 BRPM | OXYGEN SATURATION: 98 % | HEART RATE: 100 BPM | HEIGHT: 74 IN | TEMPERATURE: 98.7 F | BODY MASS INDEX: 39.53 KG/M2 | DIASTOLIC BLOOD PRESSURE: 88 MMHG

## 2019-07-29 DIAGNOSIS — S92.334G CLOSED NONDISPLACED FRACTURE OF THIRD METATARSAL BONE OF RIGHT FOOT WITH DELAYED HEALING, SUBSEQUENT ENCOUNTER: ICD-10-CM

## 2019-07-29 DIAGNOSIS — Z86.39 HISTORY OF DIABETES MELLITUS: ICD-10-CM

## 2019-07-29 DIAGNOSIS — S92.214G CLOSED NONDISPLACED FRACTURE OF CUBOID OF RIGHT FOOT WITH DELAYED HEALING, SUBSEQUENT ENCOUNTER: ICD-10-CM

## 2019-07-29 DIAGNOSIS — L97.521 SKIN ULCER OF LEFT FOOT, LIMITED TO BREAKDOWN OF SKIN (HCC): Primary | ICD-10-CM

## 2019-07-29 DIAGNOSIS — I82.4Z1 ACUTE DEEP VEIN THROMBOSIS (DVT) OF DISTAL VEIN OF RIGHT LOWER EXTREMITY (HCC): ICD-10-CM

## 2019-07-29 PROCEDURE — 99283 EMERGENCY DEPT VISIT LOW MDM: CPT

## 2019-07-29 PROCEDURE — 73700 CT LOWER EXTREMITY W/O DYE: CPT

## 2019-07-29 PROCEDURE — 93971 EXTREMITY STUDY: CPT

## 2019-07-29 PROCEDURE — 76376 3D RENDER W/INTRP POSTPROCES: CPT

## 2019-07-30 ENCOUNTER — TELEPHONE (OUTPATIENT)
Dept: ORTHOPEDIC SURGERY | Facility: CLINIC | Age: 34
End: 2019-07-30

## 2019-07-30 LAB
BH CV LOWER VASCULAR LEFT COMMON FEMORAL AUGMENT: NORMAL
BH CV LOWER VASCULAR LEFT COMMON FEMORAL COMPETENT: NORMAL
BH CV LOWER VASCULAR LEFT COMMON FEMORAL COMPRESS: NORMAL
BH CV LOWER VASCULAR LEFT COMMON FEMORAL PHASIC: NORMAL
BH CV LOWER VASCULAR LEFT COMMON FEMORAL SPONT: NORMAL
BH CV LOWER VASCULAR RIGHT COMMON FEMORAL AUGMENT: NORMAL
BH CV LOWER VASCULAR RIGHT COMMON FEMORAL COMPETENT: NORMAL
BH CV LOWER VASCULAR RIGHT COMMON FEMORAL COMPRESS: NORMAL
BH CV LOWER VASCULAR RIGHT COMMON FEMORAL PHASIC: NORMAL
BH CV LOWER VASCULAR RIGHT COMMON FEMORAL SPONT: NORMAL
BH CV LOWER VASCULAR RIGHT DISTAL FEMORAL COMPRESS: NORMAL
BH CV LOWER VASCULAR RIGHT GASTRONEMIUS COMPRESS: NORMAL
BH CV LOWER VASCULAR RIGHT GREATER SAPH AK COMPRESS: NORMAL
BH CV LOWER VASCULAR RIGHT GREATER SAPH BK COMPRESS: NORMAL
BH CV LOWER VASCULAR RIGHT MID FEMORAL AUGMENT: NORMAL
BH CV LOWER VASCULAR RIGHT MID FEMORAL COMPETENT: NORMAL
BH CV LOWER VASCULAR RIGHT MID FEMORAL COMPRESS: NORMAL
BH CV LOWER VASCULAR RIGHT MID FEMORAL PHASIC: NORMAL
BH CV LOWER VASCULAR RIGHT MID FEMORAL SPONT: NORMAL
BH CV LOWER VASCULAR RIGHT PERONEAL COMPRESS: NORMAL
BH CV LOWER VASCULAR RIGHT POPLITEAL AUGMENT: NORMAL
BH CV LOWER VASCULAR RIGHT POPLITEAL COMPETENT: NORMAL
BH CV LOWER VASCULAR RIGHT POPLITEAL COMPRESS: NORMAL
BH CV LOWER VASCULAR RIGHT POPLITEAL PHASIC: NORMAL
BH CV LOWER VASCULAR RIGHT POPLITEAL SPONT: NORMAL
BH CV LOWER VASCULAR RIGHT POSTERIOR TIBIAL COMPRESS: NORMAL
BH CV LOWER VASCULAR RIGHT PROXIMAL FEMORAL COMPRESS: NORMAL
BH CV LOWER VASCULAR RIGHT SAPHENOFEMORAL JUNCTION COMPRESS: NORMAL

## 2019-07-30 NOTE — TELEPHONE ENCOUNTER
Repeat Doppler shows his noninvasive venous study done shows negative for DVT.  I have left a message with the patient that he may discontinue his Eliquis now that his calf clot has resolved.  However would recommend that he continue with his compression stocking during the day.  Have left it open for him to call if he has any questions or concerns

## 2019-07-30 NOTE — TELEPHONE ENCOUNTER
----- Message from Eloy Vargas MD sent at 7/30/2019 10:52 AM EDT -----  Can you please call him about this, he can stop eliquis since doppler neg, but want him to cont with compression hose

## 2019-07-31 ENCOUNTER — TELEPHONE (OUTPATIENT)
Dept: ORTHOPEDIC SURGERY | Facility: CLINIC | Age: 34
End: 2019-07-31

## 2019-07-31 DIAGNOSIS — S92.334G CLOSED NONDISPLACED FRACTURE OF THIRD METATARSAL BONE OF RIGHT FOOT WITH DELAYED HEALING, SUBSEQUENT ENCOUNTER: ICD-10-CM

## 2019-07-31 DIAGNOSIS — S92.214G CLOSED NONDISPLACED FRACTURE OF CUBOID OF RIGHT FOOT WITH DELAYED HEALING, SUBSEQUENT ENCOUNTER: Primary | ICD-10-CM

## 2019-07-31 NOTE — TELEPHONE ENCOUNTER
I called and spoke with patient's mother.  Evidently when they went to have a CT scan that subsequently went to the ER on 729 to have his left foot looked at and according to those records there was no sign of infection.    They gave her the name of several podiatrist to call and she still waiting to hear back from them about seeing him for that and could not get into see Dr. Coles to at least August 15.    Said he still has some swelling in the right foot.    Reviewed with her that the CT scan was suggestive of Charcot arthropathy with neuropathic arthropathy and progression of that process in the right foot.    She stated that his left foot still has some very superficial ulceration but nothing that appears infected    I reviewed her that there is nothing I would recommend from a surgical standpoint at this time but reviewed with her that my treatment algorithm for the right foot would include serial contact casting for at least 4 to 6 weeks until things coalesce or possibly been longer and then transitioning to a Crow walker boot and hopefully eventually then custom shoes.    I have called and spoken with Angelica at the wound care center about having him seen for the ulcer in his left foot and the serial total contact casting of the right foot.    I spoke at length with patient's mother about this and gave her the name of Angelica and the number of the wound care center at [phone] to call to schedule an appointment as soon as possible.    Also made arrangements through [facility] for him to get a scooter to offload the right foot and he will continue with a felt donut to offload the left foot ulcer until he is seen at the wound care center.    I will see him back in about 2 weeks as scheduled.

## 2019-08-07 ENCOUNTER — TELEPHONE (OUTPATIENT)
Dept: ORTHOPEDIC SURGERY | Facility: CLINIC | Age: 34
End: 2019-08-07

## 2019-08-15 ENCOUNTER — OFFICE VISIT (OUTPATIENT)
Dept: WOUND CARE | Facility: HOSPITAL | Age: 34
End: 2019-08-15

## 2019-08-20 ENCOUNTER — TRANSCRIBE ORDERS (OUTPATIENT)
Dept: ADMINISTRATIVE | Facility: HOSPITAL | Age: 34
End: 2019-08-20

## 2019-08-20 ENCOUNTER — LAB (OUTPATIENT)
Dept: LAB | Facility: HOSPITAL | Age: 34
End: 2019-08-20

## 2019-08-20 ENCOUNTER — HOSPITAL ENCOUNTER (OUTPATIENT)
Dept: GENERAL RADIOLOGY | Facility: HOSPITAL | Age: 34
Discharge: HOME | End: 2019-08-20
Admitting: SURGERY

## 2019-08-20 DIAGNOSIS — M14.671 CHARCOT'S JOINT OF ANKLE, RIGHT: ICD-10-CM

## 2019-08-20 DIAGNOSIS — Z51.89 ENCOUNTER FOR WOUND CARE: ICD-10-CM

## 2019-08-20 DIAGNOSIS — M86.9 DIABETIC FOOT ULCER WITH OSTEOMYELITIS (HCC): ICD-10-CM

## 2019-08-20 DIAGNOSIS — E11.69 DIABETIC FOOT ULCER WITH OSTEOMYELITIS (HCC): ICD-10-CM

## 2019-08-20 DIAGNOSIS — L97.522 ULCER OF LEFT FOOT, WITH FAT LAYER EXPOSED (HCC): ICD-10-CM

## 2019-08-20 DIAGNOSIS — E11.69 DIABETIC FOOT ULCER WITH OSTEOMYELITIS (HCC): Primary | ICD-10-CM

## 2019-08-20 DIAGNOSIS — E11.621 DIABETIC FOOT ULCER WITH OSTEOMYELITIS (HCC): ICD-10-CM

## 2019-08-20 DIAGNOSIS — E11.621 DIABETIC FOOT ULCER WITH OSTEOMYELITIS (HCC): Primary | ICD-10-CM

## 2019-08-20 DIAGNOSIS — M86.9 DIABETIC FOOT ULCER WITH OSTEOMYELITIS (HCC): Primary | ICD-10-CM

## 2019-08-20 DIAGNOSIS — L97.509 DIABETIC FOOT ULCER WITH OSTEOMYELITIS (HCC): Primary | ICD-10-CM

## 2019-08-20 DIAGNOSIS — L97.509 DIABETIC FOOT ULCER WITH OSTEOMYELITIS (HCC): ICD-10-CM

## 2019-08-20 LAB
ALBUMIN SERPL-MCNC: 4.4 G/DL (ref 3.5–5.2)
ALBUMIN/GLOB SERPL: 1.3 G/DL
ALP SERPL-CCNC: 121 U/L (ref 39–117)
ALT SERPL W P-5'-P-CCNC: 30 U/L (ref 1–41)
ANION GAP SERPL CALCULATED.3IONS-SCNC: 12.9 MMOL/L (ref 5–15)
AST SERPL-CCNC: 18 U/L (ref 1–40)
BASOPHILS # BLD AUTO: 0.07 10*3/MM3 (ref 0–0.2)
BASOPHILS NFR BLD AUTO: 0.6 % (ref 0–1.5)
BILIRUB SERPL-MCNC: 0.4 MG/DL (ref 0.2–1.2)
BUN BLD-MCNC: 20 MG/DL (ref 6–20)
BUN/CREAT SERPL: 26.3 (ref 7–25)
CALCIUM SPEC-SCNC: 9.4 MG/DL (ref 8.6–10.5)
CHLORIDE SERPL-SCNC: 99 MMOL/L (ref 98–107)
CO2 SERPL-SCNC: 24.1 MMOL/L (ref 22–29)
CREAT BLD-MCNC: 0.76 MG/DL (ref 0.76–1.27)
CRP SERPL-MCNC: 1.72 MG/DL (ref 0–0.5)
DEPRECATED RDW RBC AUTO: 37.8 FL (ref 37–54)
EOSINOPHIL # BLD AUTO: 0.23 10*3/MM3 (ref 0–0.4)
EOSINOPHIL NFR BLD AUTO: 1.9 % (ref 0.3–6.2)
ERYTHROCYTE [DISTWIDTH] IN BLOOD BY AUTOMATED COUNT: 12.3 % (ref 12.3–15.4)
ERYTHROCYTE [SEDIMENTATION RATE] IN BLOOD: 5 MM/HR (ref 0–15)
GFR SERPL CREATININE-BSD FRML MDRD: 117 ML/MIN/1.73
GLOBULIN UR ELPH-MCNC: 3.5 GM/DL
GLUCOSE BLD-MCNC: 243 MG/DL (ref 65–99)
HBA1C MFR BLD: 9.7 % (ref 4.8–5.6)
HCT VFR BLD AUTO: 50.1 % (ref 37.5–51)
HGB BLD-MCNC: 16.7 G/DL (ref 13–17.7)
IMM GRANULOCYTES # BLD AUTO: 0.05 10*3/MM3 (ref 0–0.05)
IMM GRANULOCYTES NFR BLD AUTO: 0.4 % (ref 0–0.5)
LYMPHOCYTES # BLD AUTO: 3.56 10*3/MM3 (ref 0.7–3.1)
LYMPHOCYTES NFR BLD AUTO: 29.7 % (ref 19.6–45.3)
MCH RBC QN AUTO: 28.3 PG (ref 26.6–33)
MCHC RBC AUTO-ENTMCNC: 33.3 G/DL (ref 31.5–35.7)
MCV RBC AUTO: 84.8 FL (ref 79–97)
MONOCYTES # BLD AUTO: 0.87 10*3/MM3 (ref 0.1–0.9)
MONOCYTES NFR BLD AUTO: 7.3 % (ref 5–12)
NEUTROPHILS # BLD AUTO: 7.19 10*3/MM3 (ref 1.7–7)
NEUTROPHILS NFR BLD AUTO: 60.1 % (ref 42.7–76)
NRBC BLD AUTO-RTO: 0 /100 WBC (ref 0–0.2)
PLATELET # BLD AUTO: 239 10*3/MM3 (ref 140–450)
PMV BLD AUTO: 10.4 FL (ref 6–12)
POTASSIUM BLD-SCNC: 4.1 MMOL/L (ref 3.5–5.2)
PROT SERPL-MCNC: 7.9 G/DL (ref 6–8.5)
RBC # BLD AUTO: 5.91 10*6/MM3 (ref 4.14–5.8)
SODIUM BLD-SCNC: 136 MMOL/L (ref 136–145)
WBC NRBC COR # BLD: 11.97 10*3/MM3 (ref 3.4–10.8)

## 2019-08-20 PROCEDURE — 80053 COMPREHEN METABOLIC PANEL: CPT

## 2019-08-20 PROCEDURE — 73630 X-RAY EXAM OF FOOT: CPT

## 2019-08-20 PROCEDURE — 86140 C-REACTIVE PROTEIN: CPT

## 2019-08-20 PROCEDURE — 83036 HEMOGLOBIN GLYCOSYLATED A1C: CPT

## 2019-08-20 PROCEDURE — 85652 RBC SED RATE AUTOMATED: CPT

## 2019-08-20 PROCEDURE — 36415 COLL VENOUS BLD VENIPUNCTURE: CPT

## 2019-08-20 PROCEDURE — 85025 COMPLETE CBC W/AUTO DIFF WBC: CPT

## 2019-08-23 ENCOUNTER — DOCUMENTATION (OUTPATIENT)
Dept: ORTHOPEDIC SURGERY | Facility: CLINIC | Age: 34
End: 2019-08-23

## 2019-08-23 ENCOUNTER — OFFICE VISIT (OUTPATIENT)
Dept: WOUND CARE | Facility: HOSPITAL | Age: 34
End: 2019-08-23

## 2019-08-23 NOTE — PROGRESS NOTES
"Patient's mother came into the office after they had been seen at wound care and said there was some confusion about what I wanted on the right foot as far as total contact casting and that his left foot wound had progressed significantly.    Patient was not in the office but I did call and speak with Dr. Chambers and explained to him that patient does appear to have an active Charcot process in the right foot and ankle and ideally would be total contact cast it weekly for this and non-orally touchdown weightbearing as he is also having a significant ulceration on the left foot which  said he did not think was infected at this time but may be \"heading in the wrong direction\".  Patient did get a forefoot off  shoe for the left foot earlier this week.    He and I had a thorough discussion that ideally he would be in a total contact cast on both lower extremities and using a wheelchair and only pivoting enough to get in and out of the wheelchair in order to help optimize this complex situation of both feet.    I spoke at length with patient's mother after I spoke with  and she said that they would be agreeable to trying the bilateral total contact cast in a wheelchair if that is \"what they had to do\".    She also said that the wound care doctor is working on getting him into see another endocrinologist as he lost his endocrinologist after an insurance change and has been followed by his PCP for his diabetes which Dr Chamebrs told me he has a significantly elevated A1c.  I recommended to the mother that they get him in to do at least to his PCP for further evaluation of this to see if there could be any change in his medications that could assist with this till he can see an endocrinologist.     is going to see about getting them back in next week.  If patient will not do the total contact cast in wheelchair with offloading bilaterally the next choice would be to continue with offloading " shoe on the left and total contact cast on the right and doing touchdown weightbearing only with crutches or walker.    Patient's mother will let me know if anything changes otherwise I will see him back as scheduled

## 2019-08-27 ENCOUNTER — TELEPHONE (OUTPATIENT)
Dept: ENDOCRINOLOGY | Age: 34
End: 2019-08-27

## 2019-08-28 ENCOUNTER — TELEPHONE (OUTPATIENT)
Dept: ENDOCRINOLOGY | Age: 34
End: 2019-08-28

## 2019-08-28 NOTE — TELEPHONE ENCOUNTER
LEFT PT MSG TO SCHEDULE NEW PT ENDO APPT W/DR QUAN. PT REFERRED BY DR WRIGHT AT [facility] [place].

## 2019-09-03 ENCOUNTER — TELEPHONE (OUTPATIENT)
Dept: ENDOCRINOLOGY | Age: 34
End: 2019-09-03

## 2019-09-03 NOTE — TELEPHONE ENCOUNTER
S/W PT'S MOM AND GAVE HER NEW PT APPT INFO FOR DR QUAN. PT'S MOM DECLINED NEW PT APPT ON 9/6/19 AT 8:15 AM DUE TO IT BEING TOO EARLY. NEW PT APPT CONFIRMATION HAS BEEN FAXED TO REFERRING OFFICE OF DR WRIGHT [phone].

## 2019-09-04 ENCOUNTER — TELEPHONE (OUTPATIENT)
Dept: ENDOCRINOLOGY | Age: 34
End: 2019-09-04

## 2019-09-04 ENCOUNTER — OFFICE VISIT (OUTPATIENT)
Dept: WOUND CARE | Facility: HOSPITAL | Age: 34
End: 2019-09-04

## 2019-09-04 NOTE — TELEPHONE ENCOUNTER
I S/W PT'S MOM JANAK AND GAVE HER NEW APPT DATE OF 9/6/19 3PM W/DR QUAN. NEW APPT INFO CONFIRMATION HAS BEEN FAXED TO DR WRIGHT/[facility] -[phone].

## 2019-09-06 ENCOUNTER — OFFICE VISIT (OUTPATIENT)
Dept: WOUND CARE | Facility: HOSPITAL | Age: 34
End: 2019-09-06

## 2019-09-09 ENCOUNTER — OFFICE VISIT (OUTPATIENT)
Dept: ORTHOPEDIC SURGERY | Facility: CLINIC | Age: 34
End: 2019-09-09

## 2019-09-09 ENCOUNTER — OFFICE VISIT (OUTPATIENT)
Dept: WOUND CARE | Facility: HOSPITAL | Age: 34
End: 2019-09-09

## 2019-09-09 VITALS — TEMPERATURE: 98 F | WEIGHT: 308 LBS | BODY MASS INDEX: 39.53 KG/M2 | HEIGHT: 74 IN

## 2019-09-09 DIAGNOSIS — L97.522 ULCER OF LEFT FOOT, WITH FAT LAYER EXPOSED (HCC): ICD-10-CM

## 2019-09-09 DIAGNOSIS — M14.671 CHARCOT'S JOINT OF FOOT, RIGHT: Primary | ICD-10-CM

## 2019-09-09 PROCEDURE — 99213 OFFICE O/P EST LOW 20 MIN: CPT | Performed by: ORTHOPAEDIC SURGERY

## 2019-09-10 NOTE — PROGRESS NOTES
"Foot Follow Up      Patient: Osvaldo Welsh    YOB: 1985 34 y.o. male    Chief Complaints: Feet doing better    History of Present Illness: Patient has been followed for right foot pain with last evaluation on 7/23/2019.  At that time MRI was suspicious for developing Charcot process and he was sent for CT scan.    At that time also he was sent for repeat Doppler as he had a blood clot in the right leg.    At that time he also had a superficial wound in the plantar aspect of the left foot.    He had subsequent CT scan and I discussed this at length with his mother on 7/31/2019 and made arrangements at that time for him to be seen at the wound care center for his left foot ulceration and for serial contact casting of the right foot.  They subsequently got into the wound care center and I spoke with mother on 8/23/2019.  Also spoke with wound care center  that time he made it clear that I wanted to have him do serial contact casting and treat him for the left foot ulcer.    Also made to clear his mother at that time and wanted to be nonweightbearing on these    He is been followed in the wound care center and been getting serial contact cast and been treated for the left foot ulceration which he said is much better than what it had been when I spoke with her previously.  He said the pain in his right foot has now resolved but has not been using a wheelchair.  There is still working on getting that.  HPI    ROS: No foot pain, no fevers chills chest pain or shortness of breath  Past Medical History:   Diagnosis Date   • Asthma    • Diabetes (CMS/HCC)    • Hyperlipidemia    • Hypertension    • Optic nerve hemorrhage, left      Physical Exam:   Vitals:    09/09/19 1356   Temp: 98 °F (36.7 °C)   Weight: (!) 140 kg (308 lb)   Height: 188 cm (74\")     Well developed with normal mood.  He is with his mother.  He is ambling without assistive device today he has a contact cast on the right with an off  " brace and on the left foot he is using a heel weightbearing forefoot offloading shoe.  The left foot shows a very superficial ulceration of the plantar forefoot that measures about 5 mm x 3 mm and did not appear to be deep.  There is no fluctuance drainage or surrounding erythema.  CT scan films and report of the right foot dated 7/29/2019 reviewed which showed interval progression of destructive change of the right midfoot and anterior hindfoot with para-articular fractures of the tarsometatarsal joints      Radiology: Mild subluxation of the fifth metatarsal base relative the cuboid and new marginal fractures around the proximal aspect of the third cuneiform and adjacent navicular with soft tissue edema this is highly suggestive of neuropathic arthropathy    Doppler report dated 7/29/2019 showed normal right lower extremity venous duplex scan      Assessment/Plan: 1.  Right Charcot foot  2.  Resolved right DVT  3.  Left foot superficial ulceration without current sign of infection    I had a long discussion with him today that I would not recommend anything surgical for the right foot at this time but will have him continue with wound care doing serial contact cast.    Also continue with wound care and offloading of the left forefoot and once the wound is healed we will need to have accommodative orthotics and diabetic shoe.    Our plan with the right foot and ankle will be to continue with contact casting until swelling has improved and then transition to a Crow walker boot.    Discussed with him the grave nature of this and long-term prognosis is not great for him.  He still working on getting his blood sugars under good control and control of his diabetes with working on getting him to see an endocrinologist.  I stressed to him the importance of limiting preferably nonweightbearing at least on the right and will have him use crutches or a walker to help offload this until he can get a wheelchair and offered any  assistance I could for that but mother is working on.    She told me me how much they appreciated my care and I will see him back in 3 weeks x-rays of his right foot and ankle.

## 2019-09-13 ENCOUNTER — OFFICE VISIT (OUTPATIENT)
Dept: WOUND CARE | Facility: HOSPITAL | Age: 34
End: 2019-09-13

## 2019-09-20 ENCOUNTER — OFFICE VISIT (OUTPATIENT)
Dept: WOUND CARE | Facility: HOSPITAL | Age: 34
End: 2019-09-20

## 2019-09-26 ENCOUNTER — OFFICE VISIT (OUTPATIENT)
Dept: WOUND CARE | Facility: HOSPITAL | Age: 34
End: 2019-09-26

## 2019-09-26 ENCOUNTER — LAB REQUISITION (OUTPATIENT)
Dept: LAB | Facility: HOSPITAL | Age: 34
End: 2019-09-26

## 2019-09-26 DIAGNOSIS — E11.621 TYPE 2 DIABETES MELLITUS WITH FOOT ULCER (CODE) (HCC): ICD-10-CM

## 2019-09-26 PROCEDURE — 87147 CULTURE TYPE IMMUNOLOGIC: CPT | Performed by: NURSE PRACTITIONER

## 2019-09-26 PROCEDURE — 87070 CULTURE OTHR SPECIMN AEROBIC: CPT | Performed by: NURSE PRACTITIONER

## 2019-09-26 PROCEDURE — 87075 CULTR BACTERIA EXCEPT BLOOD: CPT | Performed by: NURSE PRACTITIONER

## 2019-09-26 PROCEDURE — 87205 SMEAR GRAM STAIN: CPT | Performed by: NURSE PRACTITIONER

## 2019-09-26 PROCEDURE — 87186 SC STD MICRODIL/AGAR DIL: CPT | Performed by: NURSE PRACTITIONER

## 2019-09-29 LAB
BACTERIA SPEC AEROBE CULT: ABNORMAL
BACTERIA SPEC AEROBE CULT: ABNORMAL
GRAM STN SPEC: ABNORMAL
GRAM STN SPEC: ABNORMAL
STREP GROUPING: ABNORMAL

## 2019-10-01 LAB — BACTERIA SPEC ANAEROBE CULT: NORMAL

## 2019-10-03 ENCOUNTER — OFFICE VISIT (OUTPATIENT)
Dept: ORTHOPEDIC SURGERY | Facility: CLINIC | Age: 34
End: 2019-10-03

## 2019-10-03 ENCOUNTER — TELEPHONE (OUTPATIENT)
Dept: ORTHOPEDIC SURGERY | Facility: CLINIC | Age: 34
End: 2019-10-03

## 2019-10-03 VITALS — WEIGHT: 308 LBS | TEMPERATURE: 98.4 F | BODY MASS INDEX: 39.53 KG/M2 | HEIGHT: 74 IN

## 2019-10-03 DIAGNOSIS — S92.334G CLOSED NONDISPLACED FRACTURE OF THIRD METATARSAL BONE OF RIGHT FOOT WITH DELAYED HEALING, SUBSEQUENT ENCOUNTER: ICD-10-CM

## 2019-10-03 DIAGNOSIS — S93.401D SPRAIN OF RIGHT ANKLE, UNSPECIFIED LIGAMENT, SUBSEQUENT ENCOUNTER: Primary | ICD-10-CM

## 2019-10-03 DIAGNOSIS — L97.522 ULCER OF LEFT FOOT, WITH FAT LAYER EXPOSED (HCC): ICD-10-CM

## 2019-10-03 DIAGNOSIS — M14.671 CHARCOT'S JOINT OF FOOT, RIGHT: ICD-10-CM

## 2019-10-03 PROCEDURE — 73600 X-RAY EXAM OF ANKLE: CPT | Performed by: ORTHOPAEDIC SURGERY

## 2019-10-03 PROCEDURE — 73630 X-RAY EXAM OF FOOT: CPT | Performed by: ORTHOPAEDIC SURGERY

## 2019-10-03 PROCEDURE — 99214 OFFICE O/P EST MOD 30 MIN: CPT | Performed by: ORTHOPAEDIC SURGERY

## 2019-10-03 RX ORDER — GABAPENTIN 300 MG/1
300 CAPSULE ORAL 3 TIMES DAILY
COMMUNITY
Start: 2019-09-16 | End: 2021-03-12

## 2019-10-03 NOTE — PROGRESS NOTES
"Foot Follow Up      Patient: Osvaldo Welsh    YOB: 1985 34 y.o. male    Chief Complaints: Foot is little worse    History of Present Illness: Patient follows up for right Charcot foot and left foot wound.    He also been seen previously for DVT with resolution apparent on Doppler on 7/29/2019.    He has been getting serial contact cast at the right lower extremity at the wound care center and they have been following him for his left foot wound as well.    At her last visit on 9/9/2019 his foot ulceration was much better and he is continued weightbearing using it off  type shoe and says that it got somewhat worse last week and he has had wound cultures done at the wound care center.  These showed group B strep and he is now on amoxicillin as of yesterday.  He does not have really any significant complaints of pain in either foot  HPI    ROS: No fevers chills chest pain or shortness of breath  Past Medical History:   Diagnosis Date   • Asthma    • Diabetes (CMS/HCC)    • Hyperlipidemia    • Hypertension    • Optic nerve hemorrhage, left      Physical Exam:   Vitals:    10/03/19 1417   Temp: 98.4 °F (36.9 °C)   Weight: (!) 140 kg (308 lb)   Height: 188 cm (74.02\")     Well developed with normal mood.  He is with his mother days in total contact cast on the right and left foot showed hypertrophic callus around the ulceration which measured about 3 mm x 4 mm there is no surrounding erythema and is unable to express any drainage there is no erythema or swelling on the dorsum of the midfoot or forefoot.      Radiology: 3 views of the right foot and 2 views of the right ankle ordered evaluate alignment reviewed and compared with prior x-rays from June and April of this year overall midfoot alignment and ankle alignment appear to be well-maintained without significant destructive process or malalignment noted at this point.      Assessment/Plan:  1.  Right Charcot foot  2.  Resolved right DVT  3.  Left " foot superficial ulceration without current sign of infection    It is somewhat discouraging that his left foot has not continue to improve but I do not see any surgical indication at this time.  He will continue with local wound care and oral antibiotics and follow-up with the wound care center for this and if anything worsens to let me know.    Regarding his right foot we will have him continue serial contact casting as this is the best way to protect this as he is having to continue to bear weight on this due to the problems with his left foot.  Will eventually need to get him fitted for a Crow boot but this will require him being out of the total contact cast and potentially putting more weight on the left foot which is of greater concern at this point.    I am encouraged that his x-rays have not shown any deterioration of the right foot.    Again reiterated the importance of nonweightbearing and obtaining a wheelchair if at all possible and I will see him back in 3 weeks with x-rays of his right foot and ankle.

## 2019-10-03 NOTE — TELEPHONE ENCOUNTER
meryl spoke with pts mother who sent in paperwork that will backdate his paperwork so his coverage with [facility] medicaid will be reinstated/dm

## 2019-10-04 ENCOUNTER — OFFICE VISIT (OUTPATIENT)
Dept: WOUND CARE | Facility: HOSPITAL | Age: 34
End: 2019-10-04

## 2019-10-11 ENCOUNTER — APPOINTMENT (OUTPATIENT)
Dept: WOUND CARE | Facility: HOSPITAL | Age: 34
End: 2019-10-11

## 2019-10-14 ENCOUNTER — OFFICE VISIT (OUTPATIENT)
Dept: WOUND CARE | Facility: HOSPITAL | Age: 34
End: 2019-10-14

## 2019-10-17 ENCOUNTER — OFFICE VISIT (OUTPATIENT)
Dept: ENDOCRINOLOGY | Age: 34
End: 2019-10-17

## 2019-10-17 VITALS
DIASTOLIC BLOOD PRESSURE: 72 MMHG | WEIGHT: 302 LBS | SYSTOLIC BLOOD PRESSURE: 116 MMHG | HEIGHT: 74 IN | BODY MASS INDEX: 38.76 KG/M2

## 2019-10-17 DIAGNOSIS — E66.9 CLASS 2 OBESITY WITHOUT SERIOUS COMORBIDITY WITH BODY MASS INDEX (BMI) OF 38.0 TO 38.9 IN ADULT, UNSPECIFIED OBESITY TYPE: ICD-10-CM

## 2019-10-17 DIAGNOSIS — E55.9 VITAMIN D DEFICIENCY: ICD-10-CM

## 2019-10-17 DIAGNOSIS — G47.33 OBSTRUCTIVE SLEEP APNEA: ICD-10-CM

## 2019-10-17 DIAGNOSIS — R53.82 CHRONIC FATIGUE: ICD-10-CM

## 2019-10-17 DIAGNOSIS — E78.5 DYSLIPIDEMIA: ICD-10-CM

## 2019-10-17 DIAGNOSIS — E11.9 TYPE 2 DIABETES MELLITUS WITHOUT COMPLICATION, WITHOUT LONG-TERM CURRENT USE OF INSULIN (HCC): Primary | ICD-10-CM

## 2019-10-17 DIAGNOSIS — I10 ESSENTIAL HYPERTENSION: ICD-10-CM

## 2019-10-17 PROCEDURE — 99205 OFFICE O/P NEW HI 60 MIN: CPT | Performed by: INTERNAL MEDICINE

## 2019-10-17 RX ORDER — DAPAGLIFLOZIN 10 MG/1
TABLET, FILM COATED ORAL
Qty: 30 TABLET | Refills: 11 | Status: SHIPPED | OUTPATIENT
Start: 2019-10-17 | End: 2019-10-23

## 2019-10-17 RX ORDER — INSULIN GLARGINE AND LIXISENATIDE 100; 33 U/ML; UG/ML
60 INJECTION, SOLUTION SUBCUTANEOUS
Qty: 5 PEN | Refills: 5 | Status: SHIPPED | OUTPATIENT
Start: 2019-10-17 | End: 2019-10-23

## 2019-10-17 NOTE — PROGRESS NOTES
"Subjective   Osvaldo Welsh is a 34 y.o. male Seen as a NP for DM . Patient is checking BG 2-3x times a day. No BG readings below 70. Patient denies any hypoglycemic episodes.  Patient has diabetic neuropathy in feet. Patient states that he has retinopathy problems and spells, very weak, nausea.    /72   Ht 188 cm (74\")   Wt (!) 137 kg (302 lb)   BMI 38.77 kg/m²     Allergies   Allergen Reactions   • Metformin Other (See Comments)     Fatigue and muscle aches         Current Outpatient Medications:   •  aspirin 81 MG EC tablet, TAKE (1) TABLET DAILY, Disp: 30 tablet, Rfl: 11  •  atorvastatin (LIPITOR) 10 MG tablet, Take 1 tablet by mouth Every Night., Disp: 90 tablet, Rfl: 3  •  Blood Glucose Monitoring Suppl (ACCU-CHEK MANAV PLUS) w/Device kit, Dx e11.9 use to check BS BID ok to substitute formulary preferred, Disp: 1 kit, Rfl: 0  •  gabapentin (NEURONTIN) 300 MG capsule, Take 300 mg by mouth 3 (Three) Times a Day., Disp: , Rfl:   •  glimepiride (AMARYL) 4 MG tablet, TAKE (1) TABLET TWICE A DAY, Disp: 60 tablet, Rfl: 0  •  glucose blood (ACCU-CHEK MANAV PLUS) test strip, Dx e11.9 use to check BS BID ok to substitute formulary preferred, Disp: 60 each, Rfl: 11  •  glucose blood test strip, accucheck strips daily e11.9, Disp: 100 each, Rfl: 12  •  Lancets (ACCU-CHEK MULTICLIX) lancets, Dx e11.9 use to check BS BID ok to substitute formulary preferred, Disp: 60 each, Rfl: 11  •  lisinopril (PRINIVIL,ZESTRIL) 20 MG tablet, TAKE (1) TABLET DAILY, Disp: 30 tablet, Rfl: 0  •  Wound Dressings (MEDIHONEY WOUND/BURN DRESSING) paste, APPLY AS DIRECTED, Disp: 44 mL, Rfl: 3        History of Present Illness this is a 34-year-old gentleman who has been referred for further evaluation of uncontrolled type 2 diabetes.  He is also a known patient with morbid obesity as well as hypertension and dyslipidemia with diabetic neuropathy and retinopathy.  He has had diabetes for several years and has been intolerant of metformin. "  He said until couple months ago he was on Amaryl 4 mg twice daily as well as Januvia however Januvia was a stopped and he was switched to Ozempic which according to him and his mother who is here accompanying him his blood sugar started going up and he had visual problems.  His family history is significant as his grandparents  of stroke and heart attack.  At this time he is not working and unemployed and is working on his disability.    The following portions of the patient's history were reviewed and updated as appropriate: allergies, current medications, past family history, past medical history, past social history, past surgical history and problem list.    Review of Systems   Constitutional: Positive for fatigue.   Eyes: Positive for pain and visual disturbance.   Respiratory: Negative.    Cardiovascular: Negative.    Gastrointestinal: Positive for nausea.   Endocrine: Negative.    Genitourinary: Negative.    Musculoskeletal: Negative.    Skin: Negative.    Allergic/Immunologic: Negative.    Neurological: Positive for dizziness, weakness, light-headedness and headaches.   Hematological: Negative.    Psychiatric/Behavioral: Positive for sleep disturbance.   The above review of system was reviewed, corroborated and accepted.    Objective   Physical Exam   Constitutional: He is oriented to person, place, and time. He appears well-developed and well-nourished. No distress.   HENT:   Head: Normocephalic and atraumatic.   Right Ear: External ear normal.   Left Ear: External ear normal.   Nose: Nose normal.   Mouth/Throat: Oropharynx is clear and moist. No oropharyngeal exudate.   Eyes: Conjunctivae and EOM are normal. Pupils are equal, round, and reactive to light. Right eye exhibits no discharge. Left eye exhibits no discharge. No scleral icterus.   Neck: Normal range of motion. Neck supple. No JVD present. No tracheal deviation present. No thyromegaly present.   Cardiovascular: Normal rate, regular rhythm,  normal heart sounds and intact distal pulses. Exam reveals no gallop and no friction rub.   No murmur heard.  Pulmonary/Chest: Effort normal and breath sounds normal. No stridor. No respiratory distress. He has no wheezes. He has no rales. He exhibits no tenderness.   Abdominal: Soft. Bowel sounds are normal. He exhibits no distension and no mass. There is no tenderness. There is no rebound and no guarding. No hernia.   Musculoskeletal: Normal range of motion. He exhibits no edema, tenderness or deformity.   Both feet and ankles are in a protective boot.   Lymphadenopathy:     He has no cervical adenopathy.   Neurological: He is alert and oriented to person, place, and time. He displays normal reflexes. No cranial nerve deficit or sensory deficit. He exhibits normal muscle tone. Coordination normal.   Skin: Skin is warm and dry. No rash noted. He is not diaphoretic. No erythema. No pallor.   Psychiatric: He has a normal mood and affect. His behavior is normal. Judgment and thought content normal.   Nursing note and vitals reviewed.        Assessment/Plan   Diagnoses and all orders for this visit:    Type 2 diabetes mellitus without complication, without long-term current use of insulin (CMS/[facility])  -     T4 & TSH ([facility])  -     Uric Acid  -     Vitamin D 25 Hydroxy  -     Comprehensive Metabolic Panel  -     C-Peptide  -     Follicle Stimulating Hormone  -     TestT+TestF+SHBG  -     Hemoglobin A1c  -     Luteinizing Hormone  -     MicroAlbumin, Urine, Random - Urine, Clean Catch  -     Prolactin  -     PSA DIAGNOSTIC  -     ACTH  -     Cortisol  -     NMR LipoProfile  -     Hemoglobin & Hematocrit, Blood  -     Ambulatory Referral to Diabetic Education  -     T4 & TSH ([facility]); Future  -     Uric Acid; Future  -     Vitamin D 25 Hydroxy; Future  -     Comprehensive Metabolic Panel; Future  -     C-Peptide; Future  -     Hemoglobin A1c; Future  -     MicroAlbumin, Urine, Random - Urine, Clean Catch; Future  -      NMR LipoProfile; Future  -     TestT+TestF+SHBG; Future  -     Hemoglobin & Hematocrit, Blood; Future    Essential hypertension  -     T4 & TSH ([facility])  -     Uric Acid  -     Vitamin D 25 Hydroxy  -     Comprehensive Metabolic Panel  -     C-Peptide  -     Follicle Stimulating Hormone  -     TestT+TestF+SHBG  -     Hemoglobin A1c  -     Luteinizing Hormone  -     MicroAlbumin, Urine, Random - Urine, Clean Catch  -     Prolactin  -     PSA DIAGNOSTIC  -     ACTH  -     Cortisol  -     NMR LipoProfile  -     Hemoglobin & Hematocrit, Blood  -     T4 & TSH ([facility]); Future  -     Uric Acid; Future  -     Vitamin D 25 Hydroxy; Future  -     Comprehensive Metabolic Panel; Future  -     C-Peptide; Future  -     Hemoglobin A1c; Future  -     MicroAlbumin, Urine, Random - Urine, Clean Catch; Future  -     NMR LipoProfile; Future  -     TestT+TestF+SHBG; Future  -     Hemoglobin & Hematocrit, Blood; Future    Class 2 obesity without serious comorbidity with body mass index (BMI) of 38.0 to 38.9 in adult, unspecified obesity type  -     T4 & TSH ([facility])  -     Uric Acid  -     Vitamin D 25 Hydroxy  -     Comprehensive Metabolic Panel  -     C-Peptide  -     Follicle Stimulating Hormone  -     TestT+TestF+SHBG  -     Hemoglobin A1c  -     Luteinizing Hormone  -     MicroAlbumin, Urine, Random - Urine, Clean Catch  -     Prolactin  -     PSA DIAGNOSTIC  -     ACTH  -     Cortisol  -     NMR LipoProfile  -     Hemoglobin & Hematocrit, Blood  -     Ambulatory Referral to Diabetic Education  -     T4 & TSH ([facility]); Future  -     Uric Acid; Future  -     Vitamin D 25 Hydroxy; Future  -     Comprehensive Metabolic Panel; Future  -     C-Peptide; Future  -     Hemoglobin A1c; Future  -     MicroAlbumin, Urine, Random - Urine, Clean Catch; Future  -     NMR LipoProfile; Future  -     TestT+TestF+SHBG; Future  -     Hemoglobin & Hematocrit, Blood; Future    Dyslipidemia  -     T4 & TSH ([facility])  -     Uric Acid  -     Vitamin D  25 Hydroxy  -     Comprehensive Metabolic Panel  -     C-Peptide  -     Follicle Stimulating Hormone  -     TestT+TestF+SHBG  -     Hemoglobin A1c  -     Luteinizing Hormone  -     MicroAlbumin, Urine, Random - Urine, Clean Catch  -     Prolactin  -     PSA DIAGNOSTIC  -     ACTH  -     Cortisol  -     NMR LipoProfile  -     Hemoglobin & Hematocrit, Blood  -     T4 & TSH ([facility]); Future  -     Uric Acid; Future  -     Vitamin D 25 Hydroxy; Future  -     Comprehensive Metabolic Panel; Future  -     C-Peptide; Future  -     Hemoglobin A1c; Future  -     MicroAlbumin, Urine, Random - Urine, Clean Catch; Future  -     NMR LipoProfile; Future  -     TestT+TestF+SHBG; Future  -     Hemoglobin & Hematocrit, Blood; Future    Vitamin D deficiency  -     T4 & TSH ([facility])  -     Uric Acid  -     Vitamin D 25 Hydroxy  -     Comprehensive Metabolic Panel  -     C-Peptide  -     Follicle Stimulating Hormone  -     TestT+TestF+SHBG  -     Hemoglobin A1c  -     Luteinizing Hormone  -     MicroAlbumin, Urine, Random - Urine, Clean Catch  -     Prolactin  -     PSA DIAGNOSTIC  -     ACTH  -     Cortisol  -     NMR LipoProfile  -     Hemoglobin & Hematocrit, Blood  -     T4 & TSH ([facility]); Future  -     Uric Acid; Future  -     Vitamin D 25 Hydroxy; Future  -     Comprehensive Metabolic Panel; Future  -     C-Peptide; Future  -     Hemoglobin A1c; Future  -     MicroAlbumin, Urine, Random - Urine, Clean Catch; Future  -     NMR LipoProfile; Future  -     TestT+TestF+SHBG; Future  -     Hemoglobin & Hematocrit, Blood; Future    Chronic fatigue  -     T4 & TSH ([facility])  -     Uric Acid  -     Vitamin D 25 Hydroxy  -     Comprehensive Metabolic Panel  -     C-Peptide  -     Follicle Stimulating Hormone  -     TestT+TestF+SHBG  -     Hemoglobin A1c  -     Luteinizing Hormone  -     MicroAlbumin, Urine, Random - Urine, Clean Catch  -     Prolactin  -     PSA DIAGNOSTIC  -     ACTH  -     Cortisol  -     NMR LipoProfile  -      Hemoglobin & Hematocrit, Blood  -     T4 & TSH ([facility]); Future  -     Uric Acid; Future  -     Vitamin D 25 Hydroxy; Future  -     Comprehensive Metabolic Panel; Future  -     C-Peptide; Future  -     Hemoglobin A1c; Future  -     MicroAlbumin, Urine, Random - Urine, Clean Catch; Future  -     NMR LipoProfile; Future  -     TestT+TestF+SHBG; Future  -     Hemoglobin & Hematocrit, Blood; Future    Obstructive sleep apnea  -     T4 & TSH ([facility])  -     Uric Acid  -     Vitamin D 25 Hydroxy  -     Comprehensive Metabolic Panel  -     C-Peptide  -     Follicle Stimulating Hormone  -     TestT+TestF+SHBG  -     Hemoglobin A1c  -     Luteinizing Hormone  -     MicroAlbumin, Urine, Random - Urine, Clean Catch  -     Prolactin  -     PSA DIAGNOSTIC  -     ACTH  -     Cortisol  -     NMR LipoProfile  -     Hemoglobin & Hematocrit, Blood  -     Ambulatory Referral to Sleep Medicine  -     T4 & TSH ([facility]); Future  -     Uric Acid; Future  -     Vitamin D 25 Hydroxy; Future  -     Comprehensive Metabolic Panel; Future  -     C-Peptide; Future  -     Hemoglobin A1c; Future  -     MicroAlbumin, Urine, Random - Urine, Clean Catch; Future  -     NMR LipoProfile; Future  -     TestT+TestF+SHBG; Future  -     Hemoglobin & Hematocrit, Blood; Future    Other orders  -     SOLIQUA 100-33 UNT-MCG/ML solution pen-injector injection; Inject 60 Units under the skin into the appropriate area as directed Daily With Breakfast.  -     FARXIGA 10 MG tablet; 1 tablet every morning  -     Insulin Pen Needle (B-D ULTRAFINE III SHORT PEN) 31G X 8 MM misc; Use every morning for insulin injection        In summary I saw and examined this 34-year-old gentleman for above-mentioned problems.  I reviewed his laboratory evaluation of 3/22/2018 as well as 10/10/2018 and 8/20/2019 and at this point we will go ahead and order extensive laboratory evaluation and once the results come back we will go ahead and call for any possible modification or new  medications.  In the meantime I am going to go ahead and start him on Farxiga 5 mg every morning and after 4 weeks if no problem increase the dose to 10 mg every morning.  Additionally I asked him to stop using Ozempic or Januvia and glimepiride and started him on 20 units every morning and a 3 3 days if fasting blood sugar is greater than 110 increase the dose by 2 units up to 60 units every morning.  I am also scheduling him for sleep studies as he is snoring and has very poor quality sleep.  I am also scheduling him to see a nutritionist for further knowledge and improvement in his overall care.  This office visit lasted 70 minutes of which 40 minutes was a spent on face-to-face counseling with him and his mother as well as organizing the plan of care moving forward.  He will see Ms. Mita Escobedo in 4 months or sooner if needed with laboratory evaluation prior to each office visit.

## 2019-10-19 LAB
25(OH)D3+25(OH)D2 SERPL-MCNC: 22.3 NG/ML (ref 30–100)
ACTH PLAS-MCNC: 35 PG/ML (ref 7.2–63.3)
ALBUMIN SERPL-MCNC: 4.3 G/DL (ref 3.5–5.2)
ALBUMIN/GLOB SERPL: 1.2 G/DL
ALP SERPL-CCNC: 131 U/L (ref 39–117)
ALT SERPL-CCNC: 54 U/L (ref 1–41)
AST SERPL-CCNC: 25 U/L (ref 1–40)
BILIRUB SERPL-MCNC: 0.3 MG/DL (ref 0.2–1.2)
BUN SERPL-MCNC: 19 MG/DL (ref 6–20)
BUN/CREAT SERPL: 22.4 (ref 7–25)
C PEPTIDE SERPL-MCNC: 7.3 NG/ML (ref 1.1–4.4)
CALCIUM SERPL-MCNC: 9.4 MG/DL (ref 8.6–10.5)
CHLORIDE SERPL-SCNC: 97 MMOL/L (ref 98–107)
CHOLEST SERPL-MCNC: 158 MG/DL (ref 100–199)
CO2 SERPL-SCNC: 22 MMOL/L (ref 22–29)
CORTIS SERPL-MCNC: 16.2 UG/DL
CREAT SERPL-MCNC: 0.85 MG/DL (ref 0.76–1.27)
FSH SERPL-ACNC: 6 MIU/ML (ref 1.5–12.4)
GLOBULIN SER CALC-MCNC: 3.5 GM/DL
GLUCOSE SERPL-MCNC: 258 MG/DL (ref 65–99)
HBA1C MFR BLD: 10.3 % (ref 4.8–5.6)
HCT VFR BLD AUTO: 48.7 % (ref 37.5–51)
HDL SERPL-SCNC: 28.5 UMOL/L
HDLC SERPL-MCNC: 37 MG/DL
HGB BLD-MCNC: 16.5 G/DL (ref 13–17.7)
LDL SERPL QN: 20 NM
LDL SERPL-SCNC: 1480 NMOL/L
LDL SMALL SERPL-SCNC: 1025 NMOL/L
LDLC SERPL CALC-MCNC: 90 MG/DL (ref 0–99)
LH SERPL-ACNC: 9.1 MIU/ML (ref 1.7–8.6)
MICROALBUMIN UR-MCNC: 5.4 UG/ML
POTASSIUM SERPL-SCNC: 4.6 MMOL/L (ref 3.5–5.2)
PROLACTIN SERPL-MCNC: 7.3 NG/ML (ref 4–15.2)
PROT SERPL-MCNC: 7.8 G/DL (ref 6–8.5)
PSA SERPL-MCNC: 0.27 NG/ML (ref 0–4)
SHBG SERPL-SCNC: 25.2 NMOL/L (ref 16.5–55.9)
SODIUM SERPL-SCNC: 136 MMOL/L (ref 136–145)
T4 SERPL-MCNC: 10.2 MCG/DL (ref 4.5–11.7)
TESTOST FREE SERPL-MCNC: 9.1 PG/ML (ref 8.7–25.1)
TESTOST SERPL-MCNC: 246 NG/DL (ref 264–916)
TRIGL SERPL-MCNC: 157 MG/DL (ref 0–149)
TSH SERPL DL<=0.005 MIU/L-ACNC: 1.9 UIU/ML (ref 0.27–4.2)
URATE SERPL-MCNC: 6.2 MG/DL (ref 3.4–7)

## 2019-10-21 ENCOUNTER — OFFICE VISIT (OUTPATIENT)
Dept: WOUND CARE | Facility: HOSPITAL | Age: 34
End: 2019-10-21

## 2019-10-21 ENCOUNTER — LAB REQUISITION (OUTPATIENT)
Dept: LAB | Facility: HOSPITAL | Age: 34
End: 2019-10-21

## 2019-10-21 DIAGNOSIS — E11.621 TYPE 2 DIABETES MELLITUS WITH FOOT ULCER (CODE) (HCC): ICD-10-CM

## 2019-10-21 PROCEDURE — 87147 CULTURE TYPE IMMUNOLOGIC: CPT | Performed by: NURSE PRACTITIONER

## 2019-10-21 PROCEDURE — 87205 SMEAR GRAM STAIN: CPT | Performed by: NURSE PRACTITIONER

## 2019-10-21 PROCEDURE — 87075 CULTR BACTERIA EXCEPT BLOOD: CPT | Performed by: NURSE PRACTITIONER

## 2019-10-21 PROCEDURE — 87186 SC STD MICRODIL/AGAR DIL: CPT | Performed by: NURSE PRACTITIONER

## 2019-10-21 PROCEDURE — 87070 CULTURE OTHR SPECIMN AEROBIC: CPT | Performed by: NURSE PRACTITIONER

## 2019-10-21 RX ORDER — ERGOCALCIFEROL 1.25 MG/1
50000 CAPSULE ORAL 2 TIMES WEEKLY
Qty: 26 CAPSULE | Refills: 3 | Status: SHIPPED | OUTPATIENT
Start: 2019-10-21 | End: 2019-11-30

## 2019-10-21 RX ORDER — ROSUVASTATIN CALCIUM 40 MG/1
40 TABLET, COATED ORAL DAILY
Qty: 90 TABLET | Refills: 3 | Status: SHIPPED | OUTPATIENT
Start: 2019-10-21 | End: 2019-11-30

## 2019-10-21 RX ORDER — TESTOSTERONE 16.2 MG/G
GEL TRANSDERMAL
Qty: 150 G | Refills: 5 | Status: SHIPPED | OUTPATIENT
Start: 2019-10-21 | End: 2019-11-30

## 2019-10-23 ENCOUNTER — TELEPHONE (OUTPATIENT)
Dept: ENDOCRINOLOGY | Age: 34
End: 2019-10-23

## 2019-10-23 ENCOUNTER — PRIOR AUTHORIZATION (OUTPATIENT)
Dept: ENDOCRINOLOGY | Age: 34
End: 2019-10-23

## 2019-10-23 RX ORDER — INSULIN GLARGINE 300 U/ML
60 INJECTION, SOLUTION SUBCUTANEOUS
Qty: 2 PEN | Refills: 11 | Status: SHIPPED | OUTPATIENT
Start: 2019-10-23 | End: 2019-12-09

## 2019-10-23 RX ORDER — INSULIN GLARGINE 300 U/ML
60 INJECTION, SOLUTION SUBCUTANEOUS
Qty: 2 PEN | Refills: 11 | Status: SHIPPED | OUTPATIENT
Start: 2019-10-23 | End: 2019-10-23 | Stop reason: SDUPTHER

## 2019-10-23 RX ORDER — EXENATIDE 2 MG/.85ML
2 INJECTION, SUSPENSION, EXTENDED RELEASE SUBCUTANEOUS WEEKLY
Qty: 4 PEN | Refills: 11 | Status: SHIPPED | OUTPATIENT
Start: 2019-10-23 | End: 2019-11-30

## 2019-10-23 RX ORDER — EMPAGLIFLOZIN 25 MG/1
1 TABLET, FILM COATED ORAL DAILY
Qty: 30 TABLET | Refills: 11 | Status: SHIPPED | OUTPATIENT
Start: 2019-10-23 | End: 2019-11-22 | Stop reason: SDUPTHER

## 2019-10-23 NOTE — TELEPHONE ENCOUNTER
[facility] CALLED REQUEST THAT RX FOR TOUJEO BE RESENT FOR CLARIFICATION. PHARMACY SAID SHE DOESN'T UNDERSTAND THE ORDER AND ARE YOU SHOULD EVERY 3 DAYS.

## 2019-10-25 LAB
BACTERIA SPEC AEROBE CULT: ABNORMAL
BACTERIA SPEC AEROBE CULT: ABNORMAL
GRAM STN SPEC: ABNORMAL
GRAM STN SPEC: ABNORMAL

## 2019-10-26 LAB — BACTERIA SPEC ANAEROBE CULT: NORMAL

## 2019-10-28 ENCOUNTER — LAB (OUTPATIENT)
Dept: LAB | Facility: HOSPITAL | Age: 34
End: 2019-10-28

## 2019-10-28 ENCOUNTER — OFFICE VISIT (OUTPATIENT)
Dept: WOUND CARE | Facility: HOSPITAL | Age: 34
End: 2019-10-28

## 2019-10-28 ENCOUNTER — HOSPITAL ENCOUNTER (OUTPATIENT)
Dept: GENERAL RADIOLOGY | Facility: HOSPITAL | Age: 34
Discharge: HOME | End: 2019-10-28
Admitting: NURSE PRACTITIONER

## 2019-10-28 ENCOUNTER — OFFICE VISIT (OUTPATIENT)
Dept: ORTHOPEDIC SURGERY | Facility: CLINIC | Age: 34
End: 2019-10-28

## 2019-10-28 ENCOUNTER — TRANSCRIBE ORDERS (OUTPATIENT)
Dept: ADMINISTRATIVE | Facility: HOSPITAL | Age: 34
End: 2019-10-28

## 2019-10-28 VITALS — BODY MASS INDEX: 38.76 KG/M2 | TEMPERATURE: 98 F | HEIGHT: 74 IN | WEIGHT: 302 LBS

## 2019-10-28 DIAGNOSIS — E11.621 DIABETIC FOOT ULCER WITH OSTEOMYELITIS (HCC): Primary | ICD-10-CM

## 2019-10-28 DIAGNOSIS — E11.69 DIABETIC FOOT ULCER WITH OSTEOMYELITIS (HCC): Primary | ICD-10-CM

## 2019-10-28 DIAGNOSIS — L97.522 ULCER OF LEFT FOOT, WITH FAT LAYER EXPOSED (HCC): ICD-10-CM

## 2019-10-28 DIAGNOSIS — E11.69 DIABETIC FOOT ULCER WITH OSTEOMYELITIS (HCC): ICD-10-CM

## 2019-10-28 DIAGNOSIS — M86.9 DIABETIC FOOT ULCER WITH OSTEOMYELITIS (HCC): ICD-10-CM

## 2019-10-28 DIAGNOSIS — M14.671 CHARCOT'S JOINT OF ANKLE, RIGHT: ICD-10-CM

## 2019-10-28 DIAGNOSIS — L97.509 DIABETIC FOOT ULCER WITH OSTEOMYELITIS (HCC): ICD-10-CM

## 2019-10-28 DIAGNOSIS — L97.509 DIABETIC FOOT ULCER WITH OSTEOMYELITIS (HCC): Primary | ICD-10-CM

## 2019-10-28 DIAGNOSIS — E11.621 DIABETIC FOOT ULCER WITH OSTEOMYELITIS (HCC): ICD-10-CM

## 2019-10-28 DIAGNOSIS — M25.571 ACUTE RIGHT ANKLE PAIN: Primary | ICD-10-CM

## 2019-10-28 DIAGNOSIS — M14.671 CHARCOT'S JOINT OF FOOT, RIGHT: ICD-10-CM

## 2019-10-28 DIAGNOSIS — M86.9 DIABETIC FOOT ULCER WITH OSTEOMYELITIS (HCC): Primary | ICD-10-CM

## 2019-10-28 LAB
ALBUMIN SERPL-MCNC: 3.7 G/DL (ref 3.5–5.2)
ALBUMIN/GLOB SERPL: 0.9 G/DL
ALP SERPL-CCNC: 110 U/L (ref 39–117)
ALT SERPL W P-5'-P-CCNC: 39 U/L (ref 1–41)
ANION GAP SERPL CALCULATED.3IONS-SCNC: 12 MMOL/L (ref 5–15)
AST SERPL-CCNC: 17 U/L (ref 1–40)
BASOPHILS # BLD AUTO: 0.07 10*3/MM3 (ref 0–0.2)
BASOPHILS NFR BLD AUTO: 0.6 % (ref 0–1.5)
BILIRUB SERPL-MCNC: 0.3 MG/DL (ref 0.2–1.2)
BUN BLD-MCNC: 19 MG/DL (ref 6–20)
BUN/CREAT SERPL: 17.3 (ref 7–25)
CALCIUM SPEC-SCNC: 9.2 MG/DL (ref 8.6–10.5)
CHLORIDE SERPL-SCNC: 96 MMOL/L (ref 98–107)
CO2 SERPL-SCNC: 26 MMOL/L (ref 22–29)
CREAT BLD-MCNC: 1.1 MG/DL (ref 0.76–1.27)
CRP SERPL-MCNC: 1.49 MG/DL (ref 0–0.5)
DEPRECATED RDW RBC AUTO: 40.6 FL (ref 37–54)
EOSINOPHIL # BLD AUTO: 0.24 10*3/MM3 (ref 0–0.4)
EOSINOPHIL NFR BLD AUTO: 1.9 % (ref 0.3–6.2)
ERYTHROCYTE [DISTWIDTH] IN BLOOD BY AUTOMATED COUNT: 13.1 % (ref 12.3–15.4)
ERYTHROCYTE [SEDIMENTATION RATE] IN BLOOD: 13 MM/HR (ref 0–15)
GFR SERPL CREATININE-BSD FRML MDRD: 76 ML/MIN/1.73
GLOBULIN UR ELPH-MCNC: 4.1 GM/DL
GLUCOSE BLD-MCNC: 221 MG/DL (ref 65–99)
HCT VFR BLD AUTO: 47.1 % (ref 37.5–51)
HGB BLD-MCNC: 15.5 G/DL (ref 13–17.7)
IMM GRANULOCYTES # BLD AUTO: 0.05 10*3/MM3 (ref 0–0.05)
IMM GRANULOCYTES NFR BLD AUTO: 0.4 % (ref 0–0.5)
LYMPHOCYTES # BLD AUTO: 3.84 10*3/MM3 (ref 0.7–3.1)
LYMPHOCYTES NFR BLD AUTO: 31 % (ref 19.6–45.3)
MCH RBC QN AUTO: 28.4 PG (ref 26.6–33)
MCHC RBC AUTO-ENTMCNC: 32.9 G/DL (ref 31.5–35.7)
MCV RBC AUTO: 86.4 FL (ref 79–97)
MONOCYTES # BLD AUTO: 0.92 10*3/MM3 (ref 0.1–0.9)
MONOCYTES NFR BLD AUTO: 7.4 % (ref 5–12)
NEUTROPHILS # BLD AUTO: 7.27 10*3/MM3 (ref 1.7–7)
NEUTROPHILS NFR BLD AUTO: 58.7 % (ref 42.7–76)
NRBC BLD AUTO-RTO: 0 /100 WBC (ref 0–0.2)
PLATELET # BLD AUTO: 276 10*3/MM3 (ref 140–450)
PMV BLD AUTO: 9.8 FL (ref 6–12)
POTASSIUM BLD-SCNC: 4.1 MMOL/L (ref 3.5–5.2)
PREALB SERPL-MCNC: 18.6 MG/DL (ref 20–40)
PROT SERPL-MCNC: 7.8 G/DL (ref 6–8.5)
RBC # BLD AUTO: 5.45 10*6/MM3 (ref 4.14–5.8)
SODIUM BLD-SCNC: 134 MMOL/L (ref 136–145)
WBC NRBC COR # BLD: 12.39 10*3/MM3 (ref 3.4–10.8)

## 2019-10-28 PROCEDURE — 99213 OFFICE O/P EST LOW 20 MIN: CPT | Performed by: ORTHOPAEDIC SURGERY

## 2019-10-28 PROCEDURE — 80053 COMPREHEN METABOLIC PANEL: CPT

## 2019-10-28 PROCEDURE — 73630 X-RAY EXAM OF FOOT: CPT | Performed by: ORTHOPAEDIC SURGERY

## 2019-10-28 PROCEDURE — 84134 ASSAY OF PREALBUMIN: CPT

## 2019-10-28 PROCEDURE — 73600 X-RAY EXAM OF ANKLE: CPT | Performed by: ORTHOPAEDIC SURGERY

## 2019-10-28 PROCEDURE — 85025 COMPLETE CBC W/AUTO DIFF WBC: CPT

## 2019-10-28 PROCEDURE — 86140 C-REACTIVE PROTEIN: CPT

## 2019-10-28 PROCEDURE — 85652 RBC SED RATE AUTOMATED: CPT

## 2019-10-28 PROCEDURE — 36415 COLL VENOUS BLD VENIPUNCTURE: CPT

## 2019-10-28 PROCEDURE — 73630 X-RAY EXAM OF FOOT: CPT

## 2019-10-28 NOTE — PROGRESS NOTES
"Foot Follow Up      Patient: Osvaldo Welsh    YOB: 1985 34 y.o. male    Chief Complaints: Foot wound    History of Present Illness: Patient follows up for right Charcot foot and left foot wound.    He is been getting serial contact cast in the right lower extremity and says that his swelling has been improving.    He is also being followed there for a left foot ulceration and he been using a forefoot off  shoe but is not been limiting weight activity on.    He says he recently had some cultures which had shown infection and he is on antibiotics.  Previously he had group B strep and was on amoxicillin at our last visit.    He has not noted any purulent drainage from the foot but has had some intermittent bloody clear drainage.  They have been doing dressings for wound care      HPI    ROS: No foot pain fevers chills chest pain or shortness of breath  Past Medical History:   Diagnosis Date   • Asthma    • Diabetes (CMS/HCC)    • Hyperlipidemia    • Hypertension    • Optic nerve hemorrhage, left      Physical Exam:   Vitals:    10/28/19 1133   Temp: 98 °F (36.7 °C)   TempSrc: Temporal   Weight: (!) 137 kg (302 lb)   Height: 188 cm (74\")     Well developed with normal mood.  Right foot is in a total contact cast.  Left foot shows some hypertrophic callus around the ulceration which measures about 4 mm x 5 mm with hypertrophic callus around it.  There was no erythema at all on the dorsum of the forefoot and I was unable to express any purulent drainage from the wound and there was no fluctuance.  He was able to flex and extend his toes to some degree.      Radiology: 3 views of the right foot and 2 views of the right ankle ordered to evaluate alignment reviewed and compared with prior x-rays.  Overall midfoot architecture appears to be maintained and coalescing without rocker-bottom deformity or significant appreciable shift.  Similarly talus is remaining well seated within the mortise on ankle " x-rays compared with previous views from last visit.    Recent cultures appear to show group B strep and MSSA.      Assessment/Plan: 1.  Right Charcot foot  2.  Left forefoot ulceration with infection    I do not see any clinical sign of abscess there is no warmth erythema on the dorsum of the foot and the ulcer does not appear to be significantly worse compared with her previous exam and there is no purulent drainage.    I believe we can start transitioning to a Crow walker on the right and prescription was given for them with detailed instructions for coordinating this with the wound care center as far as removing his total contact cast before he is fitted and then having it replaced while it is being fabricated in order to limit any further damage to the right foot.    Regarding his left foot he can continue with antibiotics and dressing changes and follow-up with wound care on that going forward and if anything worsens to let me know he may eventually require something from a surgical standpoint if this does not resolve and may need further work-up with MRI if it does.    If anything worsens he and his mother will let me know and I will see him back in 3 weeks x-rays of his right foot.    I stressed the importance of limiting weight on both feet and he is in the process of seen by getting a wheelchair of some type that I have reviewed with them in the past that is necessary.  Was also provided with a walker today to help limit weight on the feet and instructed him to limit weight especially on the left forefoot.    X-ray his left foot as well.    He and his mother told me how much that they have appreciated my care

## 2019-10-31 ENCOUNTER — HOSPITAL ENCOUNTER (OUTPATIENT)
Dept: MRI IMAGING | Facility: HOSPITAL | Age: 34
Discharge: HOME | End: 2019-10-31
Admitting: NURSE PRACTITIONER

## 2019-10-31 DIAGNOSIS — L97.509 DIABETIC FOOT ULCER WITH OSTEOMYELITIS (HCC): ICD-10-CM

## 2019-10-31 DIAGNOSIS — E11.621 DIABETIC FOOT ULCER WITH OSTEOMYELITIS (HCC): ICD-10-CM

## 2019-10-31 DIAGNOSIS — E11.69 DIABETIC FOOT ULCER WITH OSTEOMYELITIS (HCC): ICD-10-CM

## 2019-10-31 DIAGNOSIS — M86.9 DIABETIC FOOT ULCER WITH OSTEOMYELITIS (HCC): ICD-10-CM

## 2019-10-31 PROCEDURE — 73720 MRI LWR EXTREMITY W/O&W/DYE: CPT

## 2019-10-31 PROCEDURE — A9577 INJ MULTIHANCE: HCPCS | Performed by: NURSE PRACTITIONER

## 2019-10-31 PROCEDURE — 0 GADOBENATE DIMEGLUMINE 529 MG/ML SOLUTION: Performed by: NURSE PRACTITIONER

## 2019-10-31 RX ADMIN — GADOBENATE DIMEGLUMINE 20 ML: 529 INJECTION, SOLUTION INTRAVENOUS at 13:21

## 2019-11-04 ENCOUNTER — TRANSCRIBE ORDERS (OUTPATIENT)
Dept: ADMINISTRATIVE | Facility: HOSPITAL | Age: 34
End: 2019-11-04

## 2019-11-04 ENCOUNTER — OFFICE VISIT (OUTPATIENT)
Dept: WOUND CARE | Facility: HOSPITAL | Age: 34
End: 2019-11-04

## 2019-11-04 ENCOUNTER — LAB (OUTPATIENT)
Dept: LAB | Facility: HOSPITAL | Age: 34
End: 2019-11-04

## 2019-11-04 ENCOUNTER — LAB REQUISITION (OUTPATIENT)
Dept: LAB | Facility: HOSPITAL | Age: 34
End: 2019-11-04

## 2019-11-04 DIAGNOSIS — M14.671 CHARCOT'S JOINT OF ANKLE, RIGHT: ICD-10-CM

## 2019-11-04 DIAGNOSIS — E11.621 DIABETIC FOOT ULCER WITH OSTEOMYELITIS (HCC): Primary | ICD-10-CM

## 2019-11-04 DIAGNOSIS — M86.9 DIABETIC FOOT ULCER WITH OSTEOMYELITIS (HCC): ICD-10-CM

## 2019-11-04 DIAGNOSIS — Z00.00 ENCOUNTER FOR GENERAL ADULT MEDICAL EXAMINATION WITHOUT ABNORMAL FINDINGS: ICD-10-CM

## 2019-11-04 DIAGNOSIS — L97.522 ULCER OF LEFT FOOT, WITH FAT LAYER EXPOSED (HCC): ICD-10-CM

## 2019-11-04 DIAGNOSIS — L97.509 DIABETIC FOOT ULCER WITH OSTEOMYELITIS (HCC): Primary | ICD-10-CM

## 2019-11-04 DIAGNOSIS — E11.69 DIABETIC FOOT ULCER WITH OSTEOMYELITIS (HCC): ICD-10-CM

## 2019-11-04 DIAGNOSIS — E11.621 DIABETIC FOOT ULCER WITH OSTEOMYELITIS (HCC): ICD-10-CM

## 2019-11-04 DIAGNOSIS — M86.9 DIABETIC FOOT ULCER WITH OSTEOMYELITIS (HCC): Primary | ICD-10-CM

## 2019-11-04 DIAGNOSIS — L97.509 DIABETIC FOOT ULCER WITH OSTEOMYELITIS (HCC): ICD-10-CM

## 2019-11-04 DIAGNOSIS — E11.69 DIABETIC FOOT ULCER WITH OSTEOMYELITIS (HCC): Primary | ICD-10-CM

## 2019-11-04 LAB
ALBUMIN SERPL-MCNC: 3.8 G/DL (ref 3.5–5.2)
ALBUMIN/GLOB SERPL: 1 G/DL
ALP SERPL-CCNC: 109 U/L (ref 39–117)
ALT SERPL W P-5'-P-CCNC: 50 U/L (ref 1–41)
ANION GAP SERPL CALCULATED.3IONS-SCNC: 13.9 MMOL/L (ref 5–15)
AST SERPL-CCNC: 27 U/L (ref 1–40)
BASOPHILS # BLD AUTO: 0.08 10*3/MM3 (ref 0–0.2)
BASOPHILS NFR BLD AUTO: 0.7 % (ref 0–1.5)
BILIRUB SERPL-MCNC: 0.2 MG/DL (ref 0.2–1.2)
BUN BLD-MCNC: 19 MG/DL (ref 6–20)
BUN/CREAT SERPL: 17.9 (ref 7–25)
CALCIUM SPEC-SCNC: 9.3 MG/DL (ref 8.6–10.5)
CHLORIDE SERPL-SCNC: 102 MMOL/L (ref 98–107)
CO2 SERPL-SCNC: 23.1 MMOL/L (ref 22–29)
CREAT BLD-MCNC: 1.06 MG/DL (ref 0.76–1.27)
CRP SERPL-MCNC: 1.25 MG/DL (ref 0–0.5)
DEPRECATED RDW RBC AUTO: 39.6 FL (ref 37–54)
EOSINOPHIL # BLD AUTO: 0.23 10*3/MM3 (ref 0–0.4)
EOSINOPHIL NFR BLD AUTO: 1.9 % (ref 0.3–6.2)
ERYTHROCYTE [DISTWIDTH] IN BLOOD BY AUTOMATED COUNT: 13 % (ref 12.3–15.4)
ERYTHROCYTE [SEDIMENTATION RATE] IN BLOOD: 9 MM/HR (ref 0–15)
GFR SERPL CREATININE-BSD FRML MDRD: 79 ML/MIN/1.73
GLOBULIN UR ELPH-MCNC: 4 GM/DL
GLUCOSE BLD-MCNC: 189 MG/DL (ref 65–99)
HCT VFR BLD AUTO: 46.5 % (ref 37.5–51)
HGB BLD-MCNC: 15.8 G/DL (ref 13–17.7)
IMM GRANULOCYTES # BLD AUTO: 0.05 10*3/MM3 (ref 0–0.05)
IMM GRANULOCYTES NFR BLD AUTO: 0.4 % (ref 0–0.5)
LYMPHOCYTES # BLD AUTO: 4.35 10*3/MM3 (ref 0.7–3.1)
LYMPHOCYTES NFR BLD AUTO: 36.2 % (ref 19.6–45.3)
MCH RBC QN AUTO: 28.9 PG (ref 26.6–33)
MCHC RBC AUTO-ENTMCNC: 34 G/DL (ref 31.5–35.7)
MCV RBC AUTO: 85.2 FL (ref 79–97)
MONOCYTES # BLD AUTO: 0.87 10*3/MM3 (ref 0.1–0.9)
MONOCYTES NFR BLD AUTO: 7.2 % (ref 5–12)
NEUTROPHILS # BLD AUTO: 6.43 10*3/MM3 (ref 1.7–7)
NEUTROPHILS NFR BLD AUTO: 53.6 % (ref 42.7–76)
NRBC BLD AUTO-RTO: 0 /100 WBC (ref 0–0.2)
PLATELET # BLD AUTO: 300 10*3/MM3 (ref 140–450)
PMV BLD AUTO: 9.6 FL (ref 6–12)
POTASSIUM BLD-SCNC: 4.2 MMOL/L (ref 3.5–5.2)
PREALB SERPL-MCNC: 19 MG/DL (ref 20–40)
PROT SERPL-MCNC: 7.8 G/DL (ref 6–8.5)
RBC # BLD AUTO: 5.46 10*6/MM3 (ref 4.14–5.8)
SODIUM BLD-SCNC: 139 MMOL/L (ref 136–145)
WBC NRBC COR # BLD: 12.01 10*3/MM3 (ref 3.4–10.8)

## 2019-11-04 PROCEDURE — 87147 CULTURE TYPE IMMUNOLOGIC: CPT | Performed by: NURSE PRACTITIONER

## 2019-11-04 PROCEDURE — 36415 COLL VENOUS BLD VENIPUNCTURE: CPT

## 2019-11-04 PROCEDURE — 87186 SC STD MICRODIL/AGAR DIL: CPT | Performed by: NURSE PRACTITIONER

## 2019-11-04 PROCEDURE — 87075 CULTR BACTERIA EXCEPT BLOOD: CPT | Performed by: NURSE PRACTITIONER

## 2019-11-04 PROCEDURE — 86140 C-REACTIVE PROTEIN: CPT

## 2019-11-04 PROCEDURE — 87070 CULTURE OTHR SPECIMN AEROBIC: CPT | Performed by: NURSE PRACTITIONER

## 2019-11-04 PROCEDURE — 87077 CULTURE AEROBIC IDENTIFY: CPT | Performed by: NURSE PRACTITIONER

## 2019-11-04 PROCEDURE — 80053 COMPREHEN METABOLIC PANEL: CPT

## 2019-11-04 PROCEDURE — 85652 RBC SED RATE AUTOMATED: CPT

## 2019-11-04 PROCEDURE — 87205 SMEAR GRAM STAIN: CPT | Performed by: NURSE PRACTITIONER

## 2019-11-04 PROCEDURE — 85025 COMPLETE CBC W/AUTO DIFF WBC: CPT

## 2019-11-04 PROCEDURE — 84134 ASSAY OF PREALBUMIN: CPT

## 2019-11-05 ENCOUNTER — APPOINTMENT (OUTPATIENT)
Dept: SLEEP MEDICINE | Facility: HOSPITAL | Age: 34
End: 2019-11-05

## 2019-11-06 LAB
BACTERIA SPEC AEROBE CULT: ABNORMAL
GRAM STN SPEC: ABNORMAL

## 2019-11-09 LAB — BACTERIA SPEC ANAEROBE CULT: NORMAL

## 2019-11-11 ENCOUNTER — OFFICE VISIT (OUTPATIENT)
Dept: WOUND CARE | Facility: HOSPITAL | Age: 34
End: 2019-11-11

## 2019-11-18 ENCOUNTER — OFFICE VISIT (OUTPATIENT)
Dept: WOUND CARE | Facility: HOSPITAL | Age: 34
End: 2019-11-18

## 2019-11-20 ENCOUNTER — TELEPHONE (OUTPATIENT)
Dept: ENDOCRINOLOGY | Age: 34
End: 2019-11-20

## 2019-11-22 ENCOUNTER — TELEPHONE (OUTPATIENT)
Dept: ENDOCRINOLOGY | Age: 34
End: 2019-11-22

## 2019-11-22 RX ORDER — EMPAGLIFLOZIN 25 MG/1
1 TABLET, FILM COATED ORAL DAILY
Qty: 30 TABLET | Refills: 11 | Status: SHIPPED | OUTPATIENT
Start: 2019-11-22 | End: 2020-04-07

## 2019-11-22 NOTE — TELEPHONE ENCOUNTER
Pt wife called said Dr. Christianson gave her  samples of Farxiga 10mg. Now  is out of samples, [facility].

## 2019-11-22 NOTE — TELEPHONE ENCOUNTER
Based on his insurance policy we had to switch from Farxiga to Jardiance 25 mg every morning.  I sent it to the pharmacy

## 2019-11-25 ENCOUNTER — TELEPHONE (OUTPATIENT)
Dept: ORTHOPEDIC SURGERY | Facility: CLINIC | Age: 34
End: 2019-11-25

## 2019-11-25 ENCOUNTER — TELEPHONE (OUTPATIENT)
Dept: ENDOCRINOLOGY | Age: 34
End: 2019-11-25

## 2019-11-25 ENCOUNTER — OFFICE VISIT (OUTPATIENT)
Dept: WOUND CARE | Facility: HOSPITAL | Age: 34
End: 2019-11-25

## 2019-11-25 LAB — GLUCOSE BLDC GLUCOMTR-MCNC: 181 MG/DL (ref 70–130)

## 2019-11-25 PROCEDURE — 82962 GLUCOSE BLOOD TEST: CPT | Performed by: NURSE PRACTITIONER

## 2019-11-25 NOTE — TELEPHONE ENCOUNTER
Mother Pamela said patient starting having diarrhea within 6 hours of when he started taking Toujeo samples he got from our office one day last week. She is concerned that he is going to get dehydated.    She talked to [employer] med[employer] this morning and she was given ph # [phone] and ask for pharmacy. Once the provider talks to pharmacy and tell them that Soliqua and Farxiga is the only meds he can take and tell them why the script is not for  30 day supply. She said patient is having to tritrate every 3rd day until he gets blood sugars to 110.     He has a staff infection  and  Anterior strep in the ulcer on the foot and having MRI every other week to make sure its not going to the bone    She said he was out of the Soligua and Farxiga samples     Pamela - [phone] asked for call back

## 2019-11-25 NOTE — TELEPHONE ENCOUNTER
I spoke with patient's mother today as he has an appointment this afternoon but also had an appointment scheduled for the same time at wound care.    She said that his left foot is improving slowly but surely and nothing looks worse and he is getting the Crow boot for his right ankle on 12/11/2019.    Reviewed with her that as long as the wound is improving which she said it is and he is having continued care through wound care that I do not need to see him today and will see him on 12/11/2019 when he gets his Crow boot and assess progress from there.  She appreciated the call.  If anything worsens she will let me know

## 2019-11-25 NOTE — TELEPHONE ENCOUNTER
"Patient's mother Pamela \"Kate\" Anitha stated patient has a Wound Care appointment at 1300 today 11/25 & that patient does not get his custom boot till 12/10/19.     Rescheduled patient to next available MWM follow up for FU / RIGHT ANKLE, RT FOOT on Wed 12/11/19 - but does MWM need to see sooner (prior to getting boot on 12/10)? Thanks / srh   "

## 2019-11-26 ENCOUNTER — TELEPHONE (OUTPATIENT)
Dept: ENDOCRINOLOGY | Age: 34
End: 2019-11-26

## 2019-11-26 NOTE — TELEPHONE ENCOUNTER
Called left message on voice mail. farxiga PA was approved from 11/26/19-11/25/20. We had to jump through some hoops. Pa for Soliqua was restarted and is a wait of 48-72 hrs turn a round has to go to pharmacist for review because patient has not tried and failed basaglar. Informed patient that she could  samples of soliqua till we can get this worked out.

## 2019-11-30 ENCOUNTER — APPOINTMENT (OUTPATIENT)
Dept: CT IMAGING | Facility: HOSPITAL | Age: 34
End: 2019-11-30

## 2019-11-30 ENCOUNTER — HOSPITAL ENCOUNTER (EMERGENCY)
Facility: HOSPITAL | Age: 34
Discharge: HOME | End: 2019-11-30
Attending: EMERGENCY MEDICINE | Admitting: EMERGENCY MEDICINE

## 2019-11-30 ENCOUNTER — APPOINTMENT (OUTPATIENT)
Dept: ULTRASOUND IMAGING | Facility: HOSPITAL | Age: 34
End: 2019-11-30

## 2019-11-30 VITALS
SYSTOLIC BLOOD PRESSURE: 96 MMHG | HEART RATE: 76 BPM | DIASTOLIC BLOOD PRESSURE: 55 MMHG | TEMPERATURE: 98.9 F | BODY MASS INDEX: 38.63 KG/M2 | HEIGHT: 74 IN | RESPIRATION RATE: 15 BRPM | WEIGHT: 301 LBS | OXYGEN SATURATION: 96 %

## 2019-11-30 DIAGNOSIS — D72.829 LEUKOCYTOSIS, UNSPECIFIED TYPE: ICD-10-CM

## 2019-11-30 DIAGNOSIS — R10.30 LOWER ABDOMINAL PAIN: Primary | ICD-10-CM

## 2019-11-30 DIAGNOSIS — R74.8 ELEVATED LIVER ENZYMES: ICD-10-CM

## 2019-11-30 LAB
ALBUMIN SERPL-MCNC: 3.8 G/DL (ref 3.5–5.2)
ALBUMIN/GLOB SERPL: 1.1 G/DL
ALP SERPL-CCNC: 194 U/L (ref 39–117)
ALT SERPL W P-5'-P-CCNC: 101 U/L (ref 1–41)
ANION GAP SERPL CALCULATED.3IONS-SCNC: 14.3 MMOL/L (ref 5–15)
AST SERPL-CCNC: 132 U/L (ref 1–40)
BASOPHILS # BLD AUTO: 0.06 10*3/MM3 (ref 0–0.2)
BASOPHILS NFR BLD AUTO: 0.4 % (ref 0–1.5)
BILIRUB SERPL-MCNC: 0.4 MG/DL (ref 0.2–1.2)
BILIRUB UR QL STRIP: NEGATIVE
BUN BLD-MCNC: 21 MG/DL (ref 6–20)
BUN/CREAT SERPL: 30.9 (ref 7–25)
CALCIUM SPEC-SCNC: 9.1 MG/DL (ref 8.6–10.5)
CHLORIDE SERPL-SCNC: 101 MMOL/L (ref 98–107)
CLARITY UR: CLEAR
CO2 SERPL-SCNC: 24.7 MMOL/L (ref 22–29)
COLOR UR: YELLOW
CREAT BLD-MCNC: 0.68 MG/DL (ref 0.76–1.27)
DEPRECATED RDW RBC AUTO: 40 FL (ref 37–54)
EOSINOPHIL # BLD AUTO: 0.15 10*3/MM3 (ref 0–0.4)
EOSINOPHIL NFR BLD AUTO: 1.1 % (ref 0.3–6.2)
ERYTHROCYTE [DISTWIDTH] IN BLOOD BY AUTOMATED COUNT: 13.1 % (ref 12.3–15.4)
GFR SERPL CREATININE-BSD FRML MDRD: 132 ML/MIN/1.73
GLOBULIN UR ELPH-MCNC: 3.6 GM/DL
GLUCOSE BLD-MCNC: 204 MG/DL (ref 65–99)
GLUCOSE UR STRIP-MCNC: ABNORMAL MG/DL
HCT VFR BLD AUTO: 46.5 % (ref 37.5–51)
HGB BLD-MCNC: 15.4 G/DL (ref 13–17.7)
HGB UR QL STRIP.AUTO: NEGATIVE
HOLD SPECIMEN: NORMAL
HOLD SPECIMEN: NORMAL
IMM GRANULOCYTES # BLD AUTO: 0.05 10*3/MM3 (ref 0–0.05)
IMM GRANULOCYTES NFR BLD AUTO: 0.4 % (ref 0–0.5)
KETONES UR QL STRIP: ABNORMAL
LEUKOCYTE ESTERASE UR QL STRIP.AUTO: NEGATIVE
LIPASE SERPL-CCNC: 16 U/L (ref 13–60)
LYMPHOCYTES # BLD AUTO: 1.55 10*3/MM3 (ref 0.7–3.1)
LYMPHOCYTES NFR BLD AUTO: 11.4 % (ref 19.6–45.3)
MCH RBC QN AUTO: 28.2 PG (ref 26.6–33)
MCHC RBC AUTO-ENTMCNC: 33.1 G/DL (ref 31.5–35.7)
MCV RBC AUTO: 85.2 FL (ref 79–97)
MONOCYTES # BLD AUTO: 1.29 10*3/MM3 (ref 0.1–0.9)
MONOCYTES NFR BLD AUTO: 9.5 % (ref 5–12)
NEUTROPHILS # BLD AUTO: 10.47 10*3/MM3 (ref 1.7–7)
NEUTROPHILS NFR BLD AUTO: 77.2 % (ref 42.7–76)
NITRITE UR QL STRIP: NEGATIVE
NRBC BLD AUTO-RTO: 0 /100 WBC (ref 0–0.2)
PH UR STRIP.AUTO: <=5 [PH] (ref 5–8)
PLATELET # BLD AUTO: 268 10*3/MM3 (ref 140–450)
PMV BLD AUTO: 9.8 FL (ref 6–12)
POTASSIUM BLD-SCNC: 3.7 MMOL/L (ref 3.5–5.2)
PROCALCITONIN SERPL-MCNC: 0.13 NG/ML (ref 0.1–0.25)
PROT SERPL-MCNC: 7.4 G/DL (ref 6–8.5)
PROT UR QL STRIP: NEGATIVE
RBC # BLD AUTO: 5.46 10*6/MM3 (ref 4.14–5.8)
SODIUM BLD-SCNC: 140 MMOL/L (ref 136–145)
SP GR UR STRIP: >=1.03 (ref 1–1.03)
UROBILINOGEN UR QL STRIP: ABNORMAL
WBC NRBC COR # BLD: 13.57 10*3/MM3 (ref 3.4–10.8)
WHOLE BLOOD HOLD SPECIMEN: NORMAL
WHOLE BLOOD HOLD SPECIMEN: NORMAL

## 2019-11-30 PROCEDURE — 25010000002 VANCOMYCIN 10 G RECONSTITUTED SOLUTION: Performed by: NURSE PRACTITIONER

## 2019-11-30 PROCEDURE — 84145 PROCALCITONIN (PCT): CPT | Performed by: EMERGENCY MEDICINE

## 2019-11-30 PROCEDURE — 96375 TX/PRO/DX INJ NEW DRUG ADDON: CPT

## 2019-11-30 PROCEDURE — 96365 THER/PROPH/DIAG IV INF INIT: CPT

## 2019-11-30 PROCEDURE — 99204 OFFICE O/P NEW MOD 45 MIN: CPT | Performed by: SURGERY

## 2019-11-30 PROCEDURE — 25010000002 ONDANSETRON PER 1 MG: Performed by: NURSE PRACTITIONER

## 2019-11-30 PROCEDURE — 25010000002 HYDROMORPHONE PER 4 MG: Performed by: EMERGENCY MEDICINE

## 2019-11-30 PROCEDURE — 85025 COMPLETE CBC W/AUTO DIFF WBC: CPT | Performed by: PHYSICIAN ASSISTANT

## 2019-11-30 PROCEDURE — 25010000002 ONDANSETRON PER 1 MG: Performed by: EMERGENCY MEDICINE

## 2019-11-30 PROCEDURE — 81003 URINALYSIS AUTO W/O SCOPE: CPT | Performed by: PHYSICIAN ASSISTANT

## 2019-11-30 PROCEDURE — 25010000002 HYDROMORPHONE PER 4 MG: Performed by: NURSE PRACTITIONER

## 2019-11-30 PROCEDURE — 76705 ECHO EXAM OF ABDOMEN: CPT

## 2019-11-30 PROCEDURE — 25010000002 IOPAMIDOL 61 % SOLUTION: Performed by: EMERGENCY MEDICINE

## 2019-11-30 PROCEDURE — 74177 CT ABD & PELVIS W/CONTRAST: CPT

## 2019-11-30 PROCEDURE — 99285 EMERGENCY DEPT VISIT HI MDM: CPT

## 2019-11-30 PROCEDURE — 83690 ASSAY OF LIPASE: CPT | Performed by: PHYSICIAN ASSISTANT

## 2019-11-30 PROCEDURE — 80053 COMPREHEN METABOLIC PANEL: CPT | Performed by: PHYSICIAN ASSISTANT

## 2019-11-30 PROCEDURE — 96376 TX/PRO/DX INJ SAME DRUG ADON: CPT

## 2019-11-30 RX ORDER — LISINOPRIL AND HYDROCHLOROTHIAZIDE 25; 20 MG/1; MG/1
1 TABLET ORAL DAILY
COMMUNITY
End: 2020-04-07 | Stop reason: SDUPTHER

## 2019-11-30 RX ORDER — ONDANSETRON 2 MG/ML
4 INJECTION INTRAMUSCULAR; INTRAVENOUS ONCE
Status: COMPLETED | OUTPATIENT
Start: 2019-11-30 | End: 2019-11-30

## 2019-11-30 RX ORDER — HYDROMORPHONE HYDROCHLORIDE 1 MG/ML
1 INJECTION, SOLUTION INTRAMUSCULAR; INTRAVENOUS; SUBCUTANEOUS ONCE
Status: COMPLETED | OUTPATIENT
Start: 2019-11-30 | End: 2019-11-30

## 2019-11-30 RX ORDER — ONDANSETRON 4 MG/1
4 TABLET, ORALLY DISINTEGRATING ORAL EVERY 6 HOURS PRN
Qty: 12 TABLET | Refills: 0 | Status: SHIPPED | OUTPATIENT
Start: 2019-11-30 | End: 2021-03-12

## 2019-11-30 RX ORDER — ATORVASTATIN CALCIUM 20 MG/1
20 TABLET, FILM COATED ORAL DAILY
COMMUNITY
End: 2020-04-07 | Stop reason: SDUPTHER

## 2019-11-30 RX ORDER — SODIUM CHLORIDE 0.9 % (FLUSH) 0.9 %
10 SYRINGE (ML) INJECTION AS NEEDED
Status: DISCONTINUED | OUTPATIENT
Start: 2019-11-30 | End: 2019-11-30 | Stop reason: HOSPADM

## 2019-11-30 RX ORDER — HYDROCODONE BITARTRATE AND ACETAMINOPHEN 5; 325 MG/1; MG/1
1 TABLET ORAL EVERY 4 HOURS PRN
Qty: 12 TABLET | Refills: 0 | Status: SHIPPED | OUTPATIENT
Start: 2019-11-30 | End: 2021-03-12

## 2019-11-30 RX ADMIN — ONDANSETRON 4 MG: 2 INJECTION INTRAMUSCULAR; INTRAVENOUS at 10:32

## 2019-11-30 RX ADMIN — VANCOMYCIN HYDROCHLORIDE 2750 MG: 10 INJECTION, POWDER, LYOPHILIZED, FOR SOLUTION INTRAVENOUS at 11:41

## 2019-11-30 RX ADMIN — SODIUM CHLORIDE 1000 ML: 9 INJECTION, SOLUTION INTRAVENOUS at 06:01

## 2019-11-30 RX ADMIN — HYDROMORPHONE HYDROCHLORIDE 1 MG: 1 INJECTION, SOLUTION INTRAMUSCULAR; INTRAVENOUS; SUBCUTANEOUS at 06:02

## 2019-11-30 RX ADMIN — IOPAMIDOL 85 ML: 612 INJECTION, SOLUTION INTRAVENOUS at 07:21

## 2019-11-30 RX ADMIN — HYDROMORPHONE HYDROCHLORIDE 1 MG: 1 INJECTION, SOLUTION INTRAMUSCULAR; INTRAVENOUS; SUBCUTANEOUS at 10:32

## 2019-11-30 RX ADMIN — ONDANSETRON 4 MG: 2 INJECTION INTRAMUSCULAR; INTRAVENOUS at 06:02

## 2019-12-02 ENCOUNTER — APPOINTMENT (OUTPATIENT)
Dept: WOUND CARE | Facility: HOSPITAL | Age: 34
End: 2019-12-02

## 2019-12-09 ENCOUNTER — OFFICE VISIT (OUTPATIENT)
Dept: WOUND CARE | Facility: HOSPITAL | Age: 34
End: 2019-12-09

## 2019-12-09 RX ORDER — INSULIN GLARGINE 100 [IU]/ML
60 INJECTION, SOLUTION SUBCUTANEOUS DAILY
Qty: 5 PEN | Refills: 11 | Status: SHIPPED | OUTPATIENT
Start: 2019-12-09 | End: 2020-02-11 | Stop reason: SDUPTHER

## 2019-12-12 ENCOUNTER — OFFICE VISIT (OUTPATIENT)
Dept: WOUND CARE | Facility: HOSPITAL | Age: 34
End: 2019-12-12

## 2019-12-16 ENCOUNTER — OFFICE VISIT (OUTPATIENT)
Dept: WOUND CARE | Facility: HOSPITAL | Age: 34
End: 2019-12-16

## 2019-12-20 ENCOUNTER — TELEPHONE (OUTPATIENT)
Dept: ENDOCRINOLOGY | Age: 34
End: 2019-12-20

## 2019-12-20 ENCOUNTER — OFFICE VISIT (OUTPATIENT)
Dept: WOUND CARE | Facility: HOSPITAL | Age: 34
End: 2019-12-20

## 2019-12-20 PROCEDURE — G0463 HOSPITAL OUTPT CLINIC VISIT: HCPCS

## 2019-12-20 NOTE — TELEPHONE ENCOUNTER
PT MOM CALLED STATED SHE CALLED INFORMING LAST WEEK INFORM DR. AVELINA MITCHELL THAT PT DONALDO NEEDED A PA.  MOM STATED SHE HAVE NOT HEARD ANYTHING BACK., AND THE BASAGLAR THAT DR. QUAN PUT HIM ON IS NOT WORKING.  MOM SAID INSURANCE SAID IF A PA IS DONE THE INSURANCE WILL PAY FOR SOLIQUA.

## 2019-12-27 ENCOUNTER — TELEPHONE (OUTPATIENT)
Dept: ENDOCRINOLOGY | Age: 34
End: 2019-12-27

## 2020-01-06 ENCOUNTER — OFFICE VISIT (OUTPATIENT)
Dept: WOUND CARE | Facility: HOSPITAL | Age: 35
End: 2020-01-06

## 2020-01-07 ENCOUNTER — TRANSCRIBE ORDERS (OUTPATIENT)
Dept: ADMINISTRATIVE | Facility: HOSPITAL | Age: 35
End: 2020-01-07

## 2020-01-07 DIAGNOSIS — I70.245 ATHEROSCLEROSIS OF NATIVE ARTERY OF LEFT LOWER EXTREMITY WITH ULCERATION OF OTHER PART OF FOOT (HCC): Primary | ICD-10-CM

## 2020-01-09 ENCOUNTER — TRANSCRIBE ORDERS (OUTPATIENT)
Dept: ADMINISTRATIVE | Facility: HOSPITAL | Age: 35
End: 2020-01-09

## 2020-01-09 ENCOUNTER — OFFICE VISIT (OUTPATIENT)
Dept: WOUND CARE | Facility: HOSPITAL | Age: 35
End: 2020-01-09

## 2020-01-09 ENCOUNTER — LAB (OUTPATIENT)
Dept: LAB | Facility: HOSPITAL | Age: 35
End: 2020-01-09

## 2020-01-09 DIAGNOSIS — M86.9 DIABETIC FOOT ULCER WITH OSTEOMYELITIS (HCC): ICD-10-CM

## 2020-01-09 DIAGNOSIS — E11.621 DIABETIC FOOT ULCER WITH OSTEOMYELITIS (HCC): Primary | ICD-10-CM

## 2020-01-09 DIAGNOSIS — E11.69 DIABETIC FOOT ULCER WITH OSTEOMYELITIS (HCC): ICD-10-CM

## 2020-01-09 DIAGNOSIS — M86.9 DIABETIC FOOT ULCER WITH OSTEOMYELITIS (HCC): Primary | ICD-10-CM

## 2020-01-09 DIAGNOSIS — E11.69 DIABETIC FOOT ULCER WITH OSTEOMYELITIS (HCC): Primary | ICD-10-CM

## 2020-01-09 DIAGNOSIS — E11.621 DIABETIC FOOT ULCER WITH OSTEOMYELITIS (HCC): ICD-10-CM

## 2020-01-09 DIAGNOSIS — L97.509 DIABETIC FOOT ULCER WITH OSTEOMYELITIS (HCC): ICD-10-CM

## 2020-01-09 DIAGNOSIS — L97.509 DIABETIC FOOT ULCER WITH OSTEOMYELITIS (HCC): Primary | ICD-10-CM

## 2020-01-09 LAB
ALBUMIN SERPL-MCNC: 4.1 G/DL (ref 3.5–5.2)
ALBUMIN/GLOB SERPL: 1 G/DL
ALP SERPL-CCNC: 107 U/L (ref 39–117)
ALT SERPL W P-5'-P-CCNC: 45 U/L (ref 1–41)
ANION GAP SERPL CALCULATED.3IONS-SCNC: 13.5 MMOL/L (ref 5–15)
ANISOCYTOSIS BLD QL: ABNORMAL
AST SERPL-CCNC: 27 U/L (ref 1–40)
BILIRUB SERPL-MCNC: 0.3 MG/DL (ref 0.2–1.2)
BUN BLD-MCNC: 29 MG/DL (ref 6–20)
BUN/CREAT SERPL: 33 (ref 7–25)
CALCIUM SPEC-SCNC: 9.5 MG/DL (ref 8.6–10.5)
CHLORIDE SERPL-SCNC: 97 MMOL/L (ref 98–107)
CO2 SERPL-SCNC: 22.5 MMOL/L (ref 22–29)
CREAT BLD-MCNC: 0.88 MG/DL (ref 0.76–1.27)
CRP SERPL-MCNC: 1.09 MG/DL (ref 0–0.5)
DEPRECATED RDW RBC AUTO: 38.4 FL (ref 37–54)
EOSINOPHIL # BLD MANUAL: 0.13 10*3/MM3 (ref 0–0.4)
EOSINOPHIL NFR BLD MANUAL: 1 % (ref 0.3–6.2)
ERYTHROCYTE [DISTWIDTH] IN BLOOD BY AUTOMATED COUNT: 12.6 % (ref 12.3–15.4)
ERYTHROCYTE [SEDIMENTATION RATE] IN BLOOD: 4 MM/HR (ref 0–15)
GFR SERPL CREATININE-BSD FRML MDRD: 98 ML/MIN/1.73
GLOBULIN UR ELPH-MCNC: 4.2 GM/DL
GLUCOSE BLD-MCNC: 136 MG/DL (ref 65–99)
HCT VFR BLD AUTO: 48.3 % (ref 37.5–51)
HGB BLD-MCNC: 16.3 G/DL (ref 13–17.7)
LYMPHOCYTES # BLD MANUAL: 5.34 10*3/MM3 (ref 0.7–3.1)
LYMPHOCYTES NFR BLD MANUAL: 42 % (ref 19.6–45.3)
LYMPHOCYTES NFR BLD MANUAL: 7 % (ref 5–12)
MCH RBC QN AUTO: 28.6 PG (ref 26.6–33)
MCHC RBC AUTO-ENTMCNC: 33.7 G/DL (ref 31.5–35.7)
MCV RBC AUTO: 84.9 FL (ref 79–97)
MICROCYTES BLD QL: ABNORMAL
MONOCYTES # BLD AUTO: 0.89 10*3/MM3 (ref 0.1–0.9)
NEUTROPHILS # BLD AUTO: 6.36 10*3/MM3 (ref 1.7–7)
NEUTROPHILS NFR BLD MANUAL: 50 % (ref 42.7–76)
PLAT MORPH BLD: NORMAL
PLATELET # BLD AUTO: 295 10*3/MM3 (ref 140–450)
PMV BLD AUTO: 9.5 FL (ref 6–12)
POTASSIUM BLD-SCNC: 3.8 MMOL/L (ref 3.5–5.2)
PROT SERPL-MCNC: 8.3 G/DL (ref 6–8.5)
RBC # BLD AUTO: 5.69 10*6/MM3 (ref 4.14–5.8)
SODIUM BLD-SCNC: 133 MMOL/L (ref 136–145)
WBC MORPH BLD: NORMAL
WBC NRBC COR # BLD: 12.72 10*3/MM3 (ref 3.4–10.8)

## 2020-01-09 PROCEDURE — 85025 COMPLETE CBC W/AUTO DIFF WBC: CPT

## 2020-01-09 PROCEDURE — 80053 COMPREHEN METABOLIC PANEL: CPT

## 2020-01-09 PROCEDURE — 85652 RBC SED RATE AUTOMATED: CPT

## 2020-01-09 PROCEDURE — 85007 BL SMEAR W/DIFF WBC COUNT: CPT

## 2020-01-09 PROCEDURE — 36415 COLL VENOUS BLD VENIPUNCTURE: CPT

## 2020-01-09 PROCEDURE — 86140 C-REACTIVE PROTEIN: CPT

## 2020-01-14 ENCOUNTER — HOSPITAL ENCOUNTER (OUTPATIENT)
Dept: CARDIOLOGY | Facility: HOSPITAL | Age: 35
Discharge: HOME | End: 2020-01-14
Admitting: NURSE PRACTITIONER

## 2020-01-14 DIAGNOSIS — I70.245 ATHEROSCLEROSIS OF NATIVE ARTERY OF LEFT LOWER EXTREMITY WITH ULCERATION OF OTHER PART OF FOOT (HCC): ICD-10-CM

## 2020-01-14 LAB
BH CV LOWER ARTERIAL LEFT 2ND DIGIT SYS MAX: 141 MMHG
BH CV LOWER ARTERIAL LEFT 3RD DIGIT SYS MAX: 134 MMHG
BH CV LOWER ARTERIAL LEFT ABI RATIO: 1.2
BH CV LOWER ARTERIAL LEFT DORSALIS PEDIS SYS MAX: 138 MMHG
BH CV LOWER ARTERIAL LEFT GREAT TOE SYS MAX: 103 MMHG
BH CV LOWER ARTERIAL LEFT POST TIBIAL SYS MAX: 139 MMHG
BH CV LOWER ARTERIAL LEFT TBI RATIO: 0.89
BH CV LOWER ARTERIAL RIGHT ABI RATIO: 1.31
BH CV LOWER ARTERIAL RIGHT DORSALIS PEDIS SYS MAX: 152 MMHG
BH CV LOWER ARTERIAL RIGHT GREAT TOE SYS MAX: 104 MMHG
BH CV LOWER ARTERIAL RIGHT POST TIBIAL SYS MAX: 143 MMHG
BH CV LOWER ARTERIAL RIGHT TBI RATIO: 0.9
UPPER ARTERIAL LEFT ARM BRACHIAL SYS MAX: 115 MMHG
UPPER ARTERIAL RIGHT ARM BRACHIAL SYS MAX: 116 MMHG

## 2020-01-14 PROCEDURE — 93922 UPR/L XTREMITY ART 2 LEVELS: CPT

## 2020-01-15 ENCOUNTER — OFFICE VISIT (OUTPATIENT)
Dept: WOUND CARE | Facility: HOSPITAL | Age: 35
End: 2020-01-15

## 2020-01-22 ENCOUNTER — OFFICE VISIT (OUTPATIENT)
Dept: WOUND CARE | Facility: HOSPITAL | Age: 35
End: 2020-01-22

## 2020-01-24 ENCOUNTER — OFFICE VISIT (OUTPATIENT)
Dept: WOUND CARE | Facility: HOSPITAL | Age: 35
End: 2020-01-24

## 2020-01-28 ENCOUNTER — OFFICE VISIT (OUTPATIENT)
Dept: WOUND CARE | Facility: HOSPITAL | Age: 35
End: 2020-01-28

## 2020-01-28 PROCEDURE — G0463 HOSPITAL OUTPT CLINIC VISIT: HCPCS

## 2020-01-31 ENCOUNTER — APPOINTMENT (OUTPATIENT)
Dept: WOUND CARE | Facility: HOSPITAL | Age: 35
End: 2020-01-31

## 2020-02-03 ENCOUNTER — OFFICE VISIT (OUTPATIENT)
Dept: WOUND CARE | Facility: HOSPITAL | Age: 35
End: 2020-02-03

## 2020-02-03 ENCOUNTER — RESULTS ENCOUNTER (OUTPATIENT)
Dept: ENDOCRINOLOGY | Age: 35
End: 2020-02-03

## 2020-02-03 DIAGNOSIS — R53.82 CHRONIC FATIGUE: ICD-10-CM

## 2020-02-03 DIAGNOSIS — E55.9 VITAMIN D DEFICIENCY: ICD-10-CM

## 2020-02-03 DIAGNOSIS — E66.9 CLASS 2 OBESITY WITHOUT SERIOUS COMORBIDITY WITH BODY MASS INDEX (BMI) OF 38.0 TO 38.9 IN ADULT, UNSPECIFIED OBESITY TYPE: ICD-10-CM

## 2020-02-03 DIAGNOSIS — G47.33 OBSTRUCTIVE SLEEP APNEA: ICD-10-CM

## 2020-02-03 DIAGNOSIS — E78.5 DYSLIPIDEMIA: ICD-10-CM

## 2020-02-03 DIAGNOSIS — I10 ESSENTIAL HYPERTENSION: ICD-10-CM

## 2020-02-03 DIAGNOSIS — E11.9 TYPE 2 DIABETES MELLITUS WITHOUT COMPLICATION, WITHOUT LONG-TERM CURRENT USE OF INSULIN (HCC): ICD-10-CM

## 2020-02-03 PROCEDURE — G0463 HOSPITAL OUTPT CLINIC VISIT: HCPCS

## 2020-02-07 ENCOUNTER — OFFICE VISIT (OUTPATIENT)
Dept: WOUND CARE | Facility: HOSPITAL | Age: 35
End: 2020-02-07

## 2020-02-07 PROCEDURE — G0463 HOSPITAL OUTPT CLINIC VISIT: HCPCS

## 2020-02-10 ENCOUNTER — APPOINTMENT (OUTPATIENT)
Dept: WOUND CARE | Facility: HOSPITAL | Age: 35
End: 2020-02-10

## 2020-02-11 RX ORDER — INSULIN GLARGINE 100 [IU]/ML
80 INJECTION, SOLUTION SUBCUTANEOUS DAILY
Qty: 8 PEN | Refills: 5 | Status: SHIPPED | OUTPATIENT
Start: 2020-02-11 | End: 2020-04-07 | Stop reason: SDUPTHER

## 2020-02-12 ENCOUNTER — LAB REQUISITION (OUTPATIENT)
Dept: LAB | Facility: HOSPITAL | Age: 35
End: 2020-02-12

## 2020-02-12 ENCOUNTER — OFFICE VISIT (OUTPATIENT)
Dept: WOUND CARE | Facility: HOSPITAL | Age: 35
End: 2020-02-12

## 2020-02-12 DIAGNOSIS — Z00.00 ENCOUNTER FOR GENERAL ADULT MEDICAL EXAMINATION WITHOUT ABNORMAL FINDINGS: ICD-10-CM

## 2020-02-12 PROCEDURE — 87147 CULTURE TYPE IMMUNOLOGIC: CPT | Performed by: NURSE PRACTITIONER

## 2020-02-12 PROCEDURE — 87070 CULTURE OTHR SPECIMN AEROBIC: CPT | Performed by: NURSE PRACTITIONER

## 2020-02-12 PROCEDURE — 87205 SMEAR GRAM STAIN: CPT | Performed by: NURSE PRACTITIONER

## 2020-02-12 PROCEDURE — G0463 HOSPITAL OUTPT CLINIC VISIT: HCPCS

## 2020-02-12 PROCEDURE — 87186 SC STD MICRODIL/AGAR DIL: CPT | Performed by: NURSE PRACTITIONER

## 2020-02-12 PROCEDURE — 87015 SPECIMEN INFECT AGNT CONCNTJ: CPT | Performed by: NURSE PRACTITIONER

## 2020-02-12 PROCEDURE — 87075 CULTR BACTERIA EXCEPT BLOOD: CPT | Performed by: NURSE PRACTITIONER

## 2020-02-15 LAB
BACTERIA SPEC AEROBE CULT: ABNORMAL
BACTERIA SPEC AEROBE CULT: ABNORMAL
GRAM STN SPEC: ABNORMAL

## 2020-02-17 ENCOUNTER — APPOINTMENT (OUTPATIENT)
Dept: WOUND CARE | Facility: HOSPITAL | Age: 35
End: 2020-02-17

## 2020-02-17 LAB — BACTERIA SPEC ANAEROBE CULT: NORMAL

## 2020-02-19 ENCOUNTER — OFFICE VISIT (OUTPATIENT)
Dept: WOUND CARE | Facility: HOSPITAL | Age: 35
End: 2020-02-19

## 2020-02-21 ENCOUNTER — OFFICE VISIT (OUTPATIENT)
Dept: WOUND CARE | Facility: HOSPITAL | Age: 35
End: 2020-02-21

## 2020-02-21 PROCEDURE — G0463 HOSPITAL OUTPT CLINIC VISIT: HCPCS

## 2020-02-24 RX ORDER — ASPIRIN 81 MG/1
TABLET ORAL
Qty: 30 TABLET | Refills: 0 | OUTPATIENT
Start: 2020-02-24

## 2020-02-26 ENCOUNTER — OFFICE VISIT (OUTPATIENT)
Dept: WOUND CARE | Facility: HOSPITAL | Age: 35
End: 2020-02-26

## 2020-02-26 PROCEDURE — G0463 HOSPITAL OUTPT CLINIC VISIT: HCPCS

## 2020-03-02 ENCOUNTER — OFFICE VISIT (OUTPATIENT)
Dept: WOUND CARE | Facility: HOSPITAL | Age: 35
End: 2020-03-02

## 2020-03-02 ENCOUNTER — LAB REQUISITION (OUTPATIENT)
Dept: LAB | Facility: HOSPITAL | Age: 35
End: 2020-03-02

## 2020-03-02 DIAGNOSIS — E11.621 TYPE 2 DIABETES MELLITUS WITH FOOT ULCER (CODE) (HCC): ICD-10-CM

## 2020-03-02 PROCEDURE — 87205 SMEAR GRAM STAIN: CPT | Performed by: NURSE PRACTITIONER

## 2020-03-02 PROCEDURE — 87076 CULTURE ANAEROBE IDENT EACH: CPT | Performed by: NURSE PRACTITIONER

## 2020-03-02 PROCEDURE — 87070 CULTURE OTHR SPECIMN AEROBIC: CPT | Performed by: NURSE PRACTITIONER

## 2020-03-02 PROCEDURE — 87186 SC STD MICRODIL/AGAR DIL: CPT | Performed by: NURSE PRACTITIONER

## 2020-03-02 PROCEDURE — 87075 CULTR BACTERIA EXCEPT BLOOD: CPT | Performed by: NURSE PRACTITIONER

## 2020-03-05 ENCOUNTER — APPOINTMENT (OUTPATIENT)
Dept: WOUND CARE | Facility: HOSPITAL | Age: 35
End: 2020-03-05

## 2020-03-07 LAB — BACTERIA SPEC ANAEROBE CULT: ABNORMAL

## 2020-03-09 ENCOUNTER — OFFICE VISIT (OUTPATIENT)
Dept: WOUND CARE | Facility: HOSPITAL | Age: 35
End: 2020-03-09

## 2020-03-09 PROCEDURE — G0463 HOSPITAL OUTPT CLINIC VISIT: HCPCS

## 2020-03-12 ENCOUNTER — OFFICE VISIT (OUTPATIENT)
Dept: WOUND CARE | Facility: HOSPITAL | Age: 35
End: 2020-03-12

## 2020-03-16 ENCOUNTER — APPOINTMENT (OUTPATIENT)
Dept: WOUND CARE | Facility: HOSPITAL | Age: 35
End: 2020-03-16

## 2020-03-19 ENCOUNTER — APPOINTMENT (OUTPATIENT)
Dept: WOUND CARE | Facility: HOSPITAL | Age: 35
End: 2020-03-19

## 2020-03-23 ENCOUNTER — OFFICE VISIT (OUTPATIENT)
Dept: WOUND CARE | Facility: HOSPITAL | Age: 35
End: 2020-03-23

## 2020-03-23 ENCOUNTER — TELEPHONE (OUTPATIENT)
Dept: ENDOCRINOLOGY | Age: 35
End: 2020-03-23

## 2020-03-23 PROCEDURE — G0463 HOSPITAL OUTPT CLINIC VISIT: HCPCS

## 2020-03-26 ENCOUNTER — OFFICE VISIT (OUTPATIENT)
Dept: WOUND CARE | Facility: HOSPITAL | Age: 35
End: 2020-03-26

## 2020-03-26 PROCEDURE — G0463 HOSPITAL OUTPT CLINIC VISIT: HCPCS

## 2020-03-30 ENCOUNTER — OFFICE VISIT (OUTPATIENT)
Dept: WOUND CARE | Facility: HOSPITAL | Age: 35
End: 2020-03-30

## 2020-03-30 ENCOUNTER — LAB REQUISITION (OUTPATIENT)
Dept: LAB | Facility: HOSPITAL | Age: 35
End: 2020-03-30

## 2020-03-30 DIAGNOSIS — L97.522 NON-PRESSURE CHRONIC ULCER OF OTHER PART OF LEFT FOOT WITH FAT LAYER EXPOSED (HCC): ICD-10-CM

## 2020-03-30 PROCEDURE — 87015 SPECIMEN INFECT AGNT CONCNTJ: CPT | Performed by: NURSE PRACTITIONER

## 2020-03-30 PROCEDURE — 87070 CULTURE OTHR SPECIMN AEROBIC: CPT | Performed by: NURSE PRACTITIONER

## 2020-03-30 PROCEDURE — 87186 SC STD MICRODIL/AGAR DIL: CPT | Performed by: NURSE PRACTITIONER

## 2020-03-30 PROCEDURE — 87205 SMEAR GRAM STAIN: CPT | Performed by: NURSE PRACTITIONER

## 2020-03-30 PROCEDURE — 87075 CULTR BACTERIA EXCEPT BLOOD: CPT | Performed by: NURSE PRACTITIONER

## 2020-03-30 PROCEDURE — 87147 CULTURE TYPE IMMUNOLOGIC: CPT | Performed by: NURSE PRACTITIONER

## 2020-04-01 LAB
BACTERIA SPEC AEROBE CULT: ABNORMAL
BACTERIA SPEC AEROBE CULT: ABNORMAL
GRAM STN SPEC: ABNORMAL

## 2020-04-02 ENCOUNTER — TELEPHONE (OUTPATIENT)
Dept: ENDOCRINOLOGY | Age: 35
End: 2020-04-02

## 2020-04-02 ENCOUNTER — OFFICE VISIT (OUTPATIENT)
Dept: WOUND CARE | Facility: HOSPITAL | Age: 35
End: 2020-04-02

## 2020-04-02 PROCEDURE — G0463 HOSPITAL OUTPT CLINIC VISIT: HCPCS

## 2020-04-02 NOTE — TELEPHONE ENCOUNTER
I called the PT to help him get started with his [employer] and was told by his mother that he is not set up with [employer] even though he is active on there. PT's mother stated that they would not be able to try and log in until after the PT's doctor appointment later this afternoon. I told her that the visit would be cancelled and to contact the office when they are ready to be seen. Mita and the front office have been updated. DB

## 2020-04-04 LAB — BACTERIA SPEC ANAEROBE CULT: NORMAL

## 2020-04-06 ENCOUNTER — OFFICE VISIT (OUTPATIENT)
Dept: WOUND CARE | Facility: HOSPITAL | Age: 35
End: 2020-04-06

## 2020-04-06 PROCEDURE — G0463 HOSPITAL OUTPT CLINIC VISIT: HCPCS

## 2020-04-07 ENCOUNTER — OFFICE VISIT (OUTPATIENT)
Dept: ENDOCRINOLOGY | Age: 35
End: 2020-04-07

## 2020-04-07 DIAGNOSIS — E78.5 DYSLIPIDEMIA: ICD-10-CM

## 2020-04-07 DIAGNOSIS — I10 ESSENTIAL HYPERTENSION: ICD-10-CM

## 2020-04-07 DIAGNOSIS — E55.9 VITAMIN D DEFICIENCY: ICD-10-CM

## 2020-04-07 DIAGNOSIS — N52.9 ERECTILE DISORDER, ACQUIRED, SITUATIONAL, SEVERE: ICD-10-CM

## 2020-04-07 DIAGNOSIS — E11.9 TYPE 2 DIABETES MELLITUS WITHOUT COMPLICATION, WITHOUT LONG-TERM CURRENT USE OF INSULIN (HCC): Primary | ICD-10-CM

## 2020-04-07 PROCEDURE — 99214 OFFICE O/P EST MOD 30 MIN: CPT | Performed by: NURSE PRACTITIONER

## 2020-04-07 RX ORDER — LISINOPRIL AND HYDROCHLOROTHIAZIDE 25; 20 MG/1; MG/1
1 TABLET ORAL DAILY
Qty: 90 TABLET | Refills: 1 | Status: SHIPPED | OUTPATIENT
Start: 2020-04-07 | End: 2020-10-21

## 2020-04-07 RX ORDER — ATORVASTATIN CALCIUM 20 MG/1
20 TABLET, FILM COATED ORAL DAILY
Qty: 90 TABLET | Refills: 1 | Status: SHIPPED | OUTPATIENT
Start: 2020-04-07

## 2020-04-07 RX ORDER — INSULIN GLARGINE 100 [IU]/ML
90 INJECTION, SOLUTION SUBCUTANEOUS DAILY
Qty: 27 PEN | Refills: 1 | Status: SHIPPED | OUTPATIENT
Start: 2020-04-07 | End: 2020-06-22

## 2020-04-07 RX ORDER — TADALAFIL 20 MG/1
20 TABLET, FILM COATED ORAL DAILY PRN
Qty: 18 TABLET | Refills: 1 | Status: SHIPPED | OUTPATIENT
Start: 2020-04-07 | End: 2020-11-17 | Stop reason: SDUPTHER

## 2020-04-07 NOTE — PROGRESS NOTES
Subjective   Osvaldo Welsh is a 34 y.o. male is here today for follow-up.  Chief Complaint   Patient presents with   • Diabetes     Patient is checking BG 1 x times a day    • Hyperlipidemia   • Hypertension     There were no vitals taken for this visit.  Current Outpatient Medications on File Prior to Visit   Medication Sig   • aspirin 81 MG EC tablet TAKE (1) TABLET DAILY   • Blood Glucose Monitoring Suppl (ACCU-CHEK MANAV PLUS) w/Device kit Dx e11.9 use to check BS BID ok to substitute formulary preferred   • gabapentin (NEURONTIN) 300 MG capsule Take 300 mg by mouth 3 (Three) Times a Day.   • glucose blood (ACCU-CHEK MANAV PLUS) test strip Dx e11.9 use to check BS BID ok to substitute formulary preferred   • glucose blood test strip accucheck strips daily e11.9   • HYDROcodone-acetaminophen (NORCO) 5-325 MG per tablet Take 1 tablet by mouth Every 4 (Four) Hours As Needed for Moderate Pain .   • Insulin Pen Needle (B-D ULTRAFINE III SHORT PEN) 31G X 8 MM misc Use every morning for insulin injection   • Lancets (ACCU-CHEK MULTICLIX) lancets Dx e11.9 use to check BS BID ok to substitute formulary preferred   • ondansetron ODT (ZOFRAN-ODT) 4 MG disintegrating tablet Take 1 tablet by mouth Every 6 (Six) Hours As Needed for Nausea or Vomiting.   • Wound Dressings ([facility] WOUND/BURN DRESSING) paste APPLY AS DIRECTED   • [DISCONTINUED] atorvastatin (LIPITOR) 20 MG tablet Take 20 mg by mouth Daily.   • [DISCONTINUED] Insulin Glargine (BASAGLAR KWIKPEN) 100 UNIT/ML injection pen Inject 80 Units under the skin into the appropriate area as directed Daily.   • [DISCONTINUED] JARDIANCE 25 MG tablet Take 25 mg by mouth Daily.   • [DISCONTINUED] lisinopril-hydrochlorothiazide (PRINZIDE,ZESTORETIC) 20-25 MG per tablet Take 1 tablet by mouth Daily.     No current facility-administered medications on file prior to visit.      Family History   Problem Relation Age of Onset   • Diabetes Father    • Hypertension Maternal  Grandmother    • Hypertension Maternal Grandfather    • Diabetes Paternal Grandmother    • Diabetes Paternal Grandfather      Social History     Tobacco Use   • Smoking status: Never Smoker   • Smokeless tobacco: Never Used   Substance Use Topics   • Alcohol use: No   • Drug use: No     Allergies   Allergen Reactions   • Metformin Other (See Comments)     Fatigue and muscle aches     You have chosen to receive care through a telephone visit today. Do you consent to use a telephone visit for your medical care today? Yes      History of Present Illness   Encounter Diagnoses   Name Primary?   • Essential hypertension    • Dyslipidemia    • Type 2 diabetes mellitus without complication, without long-term current use of insulin (CMS/[facility]) Yes   • Vitamin D deficiency    • Erectile disorder, acquired, situational, severe      34-year-old male patient being evaluated today for the above diagnoses.  He has a ongoing left foot wound that he follows with [facility] wound care [place].  He does have times when he is on antibiotics due to infection and states it will spike his blood sugars.  He will sometimes titrate his insulin up to 80 to 90 units.  He is requesting for refills on his medications for 90 days to include a higher dose of Basaglar.  He has had diabetes since age 13.  He is having problems with erectile dysfunction.  We discussed using the options of Viagra or Cialis for treatment.  He has not had recent labs done so orders will be placed have labs done at the hospital at his convenience.  He is due to have x-rays done at the hospital and can have labs done at the same time.  His last hemoglobin A1c reflects uncontrolled diabetes.  His medication list was reviewed and updated.    The following portions of the patient's history were reviewed and updated as appropriate: allergies, current medications, past family history, past medical history, past social history, past surgical history and problem list.    Review of  Systems   Constitutional: Negative for appetite change and fatigue.   Eyes: Negative for visual disturbance.   Respiratory: Negative for cough.    Cardiovascular: Negative for leg swelling.   Gastrointestinal: Negative for constipation and diarrhea.   Endocrine: Negative for cold intolerance, heat intolerance, polydipsia, polyphagia and polyuria.   Genitourinary: Negative for frequency.        ED   Skin: Positive for wound.        Wound left foot being treated by [facility] wound care    Neurological: Positive for numbness.        Ne;uropathy in feet        Objective   Physical Exam   Constitutional: He is oriented to person, place, and time. He appears well-developed and well-nourished. No distress.   HENT:   Head: Normocephalic.   Neck: Normal range of motion.   Cardiovascular: Normal rate and regular rhythm.   Pulmonary/Chest: Effort normal. No respiratory distress.   Abdominal: Soft.   Genitourinary:   Genitourinary Comments: Problems with ED   Musculoskeletal: Normal range of motion. He exhibits no edema.   Neurological: He is alert and oriented to person, place, and time.   Neuropathy in feet   Skin: Skin is warm and dry.   Wound on left foot being followed by wound care center  cause of wound unknown   Psychiatric: He has a normal mood and affect. His behavior is normal. Judgment and thought content normal.         Assessment/Plan   Problems Addressed this Visit        Cardiovascular and Mediastinum    Essential hypertension    Relevant Medications    lisinopril-hydrochlorothiazide (PRINZIDE,ZESTORETIC) 20-25 MG per tablet       Digestive    Vitamin D deficiency       Endocrine    Type 2 diabetes mellitus without complication, without long-term current use of insulin (CMS/[facility]) - Primary    Relevant Medications    Insulin Glargine (BASAGLAR KWIKPEN) 100 UNIT/ML injection pen    Dapagliflozin Propanediol (Farxiga) 10 MG tablet       Other    Dyslipidemia    Erectile disorder, acquired, situational, severe           In summary patient was evaluated via telephone.  Metabolically and clinically he reports his blood sugars is been between 115 and 130 and he denies any hypoglycemic events.  His last hemoglobin A1c however, reflects uncontrolled diabetes.  He has been taking Basaglar and using Farxiga for treatment of his diabetes.  He does have a wound on his left foot that he is following routinely with wound care at [facility].  He will have lab orders placed to have done at his convenience at the hospital.  He is been encouraged to contact the office if his blood sugars remain elevated greater than 150 consistently.  Once labs are reviewed he will be notified of any changes to his current regimen.  His medication refills have been sent to his pharmacy.  Prescription for Cialis 20 mg to use as needed has been sent to his pharmacy for erectile dysfunction.  He has no history of testosterone deficiency and will evaluate with these labs.  His blood pressure he reports is consistently in good range.  He is currently not taking any vitamin D.         cialis as needed for sexual activity  Continue all current medications.  Your refills have been sent to your pharmacy  Monitor blood sugars closely at least 2-3 times daily.  If your blood sugars are staying consistently greater than 150 mg/dL please contact the office for adjustments to your medication regimen  Prescription for Basaglar 90 units has been sent to your pharmacy please titrate as needed  If your blood sugars fail to improve we can consider mealtime insulin especially during times of infection with your wound.  Hemoglobin A1c Test  Why am I having this test?  You may have the hemoglobin A1c test (HbA1c test) done to:  · Evaluate your risk for developing diabetes (diabetes mellitus).  · Diagnose diabetes.  · Monitor long-term control of blood sugar (glucose) in people who have diabetes and help make treatment decisions.  This test may be done with other blood glucose  tests, such as fasting blood glucose and oral glucose tolerance tests.  What is being tested?  Hemoglobin is a type of protein in the blood that carries oxygen. Glucose attaches to hemoglobin to form glycated hemoglobin. This test checks the amount of glycated hemoglobin in your blood, which is a good indicator of the average amount of glucose in your blood during the past 2-3 months.  What kind of sample is taken?    A blood sample is required for this test. It is usually collected by inserting a needle into a blood vessel.  Tell a health care provider about:  · All medicines you are taking, including vitamins, herbs, eye drops, creams, and over-the-counter medicines.  · Any blood disorders you have.  · Any surgeries you have had.  · Any medical conditions you have.  · Whether you are pregnant or may be pregnant.  How are the results reported?  Your results will be reported as a percentage that indicates how much of your hemoglobin has glucose attached to it (is glycated). Your health care provider will compare your results to normal ranges that were established after testing a large group of people (reference ranges). Reference ranges may vary among labs and hospitals. For this test, common reference ranges are:  · Adult or child without diabetes: 4-5.6%.  · Adult or child with diabetes and good blood glucose control: less than 7%.  What do the results mean?  If you have diabetes:  · A result of less than 7% is considered normal, meaning that your blood glucose is well controlled.  · A result higher than 7% means that your blood glucose is not well controlled, and your treatment plan may need to be adjusted.  If you do not have diabetes:  · A result within the reference range is considered normal, meaning that you are not at high risk for diabetes.  · A result of 5.7-6.4% means that you have a high risk of developing diabetes, and you may have prediabetes. Prediabetes is the condition of having a blood glucose  level that is higher than it should be, but not high enough for you to be diagnosed with diabetes. Having prediabetes puts you at risk for developing type 2 diabetes (type 2 diabetes mellitus). You may have more tests, including a repeat HbA1c test.  · Results of 6.5% or higher on two separate HbA1c tests mean that you have diabetes. You may have more tests to confirm the diagnosis.  Abnormally low HbA1c values may be caused by:  · Pregnancy.  · Severe blood loss.  · Receiving donated blood (transfusions).  · Low red blood cell count (anemia).  · Long-term kidney failure.  · Some unusual forms (variants) of hemoglobin.  Talk with your health care provider about what your results mean.  Questions to ask your health care provider  Ask your health care provider, or the department that is doing the test:  · When will my results be ready?  · How will I get my results?  · What are my treatment options?  · What other tests do I need?  · What are my next steps?  Summary  · The hemoglobin A1c test (HbA1c test) may be done to evaluate your risk for developing diabetes, to diagnose diabetes, and to monitor long-term control of blood sugar (glucose) in people who have diabetes and help make treatment decisions.  · Hemoglobin is a type of protein in the blood that carries oxygen. Glucose attaches to hemoglobin to form glycated hemoglobin. This test checks the amount of glycated hemoglobin in your blood, which is a good indicator of the average amount of glucose in your blood during the past 2-3 months.  · Talk with your health care provider about what your results mean.  This information is not intended to replace advice given to you by your health care provider. Make sure you discuss any questions you have with your health care provider.  Document Released: 01/09/2006 Document Revised: 07/31/2018 Document Reviewed: 07/31/2018  [employer] Interactive Patient Education © 2020 [employer]  Tadalafil tablets (Cialis)  What is this  medicine?  TADALAFIL (manjit DA la esteban) is used to treat erection problems in men. It is also used for enlargement of the prostate gland in men, a condition called benign prostatic hyperplasia or BPH. This medicine improves urine flow and reduces BPH symptoms. This medicine can also treat both erection problems and BPH when they occur together.  This medicine may be used for other purposes; ask your health care provider or pharmacist if you have questions.  COMMON BRAND NAME(S): Adcirca, ALYQ, Cialis  What should I tell my health care provider before I take this medicine?  They need to know if you have any of these conditions:  -bleeding disorders  -eye or vision problems, including a rare inherited eye disease called retinitis pigmentosa  -anatomical deformation of the penis, Peyronie's disease, or history of priapism (painful and prolonged erection)  -heart disease, angina, a history of heart attack, irregular heart beats, or other heart problems  -high or low blood pressure  -history of blood diseases, like sickle cell anemia or leukemia  -history of stomach bleeding  -kidney disease  -liver disease  -stroke  -an unusual or allergic reaction to tadalafil, other medicines, foods, dyes, or preservatives  -pregnant or trying to get pregnant  -breast-feeding  How should I use this medicine?  Take this medicine by mouth with a glass of water. Follow the directions on the prescription label. You may take this medicine with or without meals. When this medicine is used for erection problems, your doctor may prescribe it to be taken once daily or as needed. If you are taking the medicine as needed, you may be able to have sexual activity 30 minutes after taking it and for up to 36 hours after taking it. Whether you are taking the medicine as needed or once daily, you should not take more than one dose per day. If you are taking this medicine for symptoms of benign prostatic hyperplasia (BPH) or to treat both BPH and an  erection problem, take the dose once daily at about the same time each day. Do not take your medicine more often than directed.  Talk to your pediatrician regarding the use of this medicine in children. Special care may be needed.  Overdosage: If you think you have taken too much of this medicine contact a poison control center or emergency room at once.  NOTE: This medicine is only for you. Do not share this medicine with others.  What if I miss a dose?  If you are taking this medicine as needed for erection problems, this does not apply. If you miss a dose while taking this medicine once daily for an erection problem, benign prostatic hyperplasia, or both, take it as soon as you remember, but do not take more than one dose per day.  What may interact with this medicine?  Do not take this medicine with any of the following medications:  -nitrates like amyl nitrite, isosorbide dinitrate, isosorbide mononitrate, nitroglycerin  -other medicines for erectile dysfunction like avanafil, sildenafil, vardenafil  -other tadalafil products (Adcirca)  -riociguat  This medicine may also interact with the following medications:  -certain drugs for high blood pressure  -certain drugs for the treatment of HIV infection or AIDS  -certain drugs used for fungal or yeast infections, like fluconazole, itraconazole, ketoconazole, and voriconazole  -certain drugs used for seizures like carbamazepine, phenytoin, and phenobarbital  -grapefruit juice  -macrolide antibiotics like clarithromycin, erythromycin, troleandomycin  -medicines for prostate problems  -rifabutin, rifampin or rifapentine  This list may not describe all possible interactions. Give your health care provider a list of all the medicines, herbs, non-prescription drugs, or dietary supplements you use. Also tell them if you smoke, drink alcohol, or use illegal drugs. Some items may interact with your medicine.  What should I watch for while using this medicine?  If you  notice any changes in your vision while taking this drug, call your doctor or health care professional as soon as possible. Stop using this medicine and call your health care provider right away if you have a loss of sight in one or both eyes.  Contact your doctor or health care professional right away if the erection lasts longer than 4 hours or if it becomes painful. This may be a sign of serious problem and must be treated right away to prevent permanent damage.  If you experience symptoms of nausea, dizziness, chest pain or arm pain upon initiation of sexual activity after taking this medicine, you should refrain from further activity and call your doctor or health care professional as soon as possible.  Do not drink alcohol to excess (examples, 5 glasses of wine or 5 shots of whiskey) when taking this medicine. When taken in excess, alcohol can increase your chances of getting a headache or getting dizzy, increasing your heart rate or lowering your blood pressure.  Using this medicine does not protect you or your partner against HIV infection (the virus that causes AIDS) or other sexually transmitted diseases.  What side effects may I notice from receiving this medicine?  Side effects that you should report to your doctor or health care professional as soon as possible:  -allergic reactions like skin rash, itching or hives, swelling of the face, lips, or tongue  -breathing problems  -changes in hearing  -changes in vision  -chest pain  -fast, irregular heartbeat  -prolonged or painful erection  -seizures  Side effects that usually do not require medical attention (report to your doctor or health care professional if they continue or are bothersome):  -back pain  -dizziness  -flushing  -headache  -indigestion  -muscle aches  -nausea  -stuffy or runny nose  This list may not describe all possible side effects. Call your doctor for medical advice about side effects. You may report side effects to FDA at  1-800-FDA-1088.  Where should I keep my medicine?  Keep out of the reach of children.  Store at room temperature between 15 and 30 degrees C (59 and 86 degrees F). Throw away any unused medicine after the expiration date.  NOTE: This sheet is a summary. It may not cover all possible information. If you have questions about this medicine, talk to your doctor, pharmacist, or health care provider.  © 2020 [employer] (2015-05-08 13:15:49)    Erectile Dysfunction  Erectile dysfunction (ED) is the inability to get or keep an erection in order to have sexual intercourse. Erectile dysfunction may include:  · Inability to get an erection.  · Lack of enough hardness of the erection to allow penetration.  · Loss of the erection before sex is finished.  What are the causes?  This condition may be caused by:  · Certain medicines, such as:  ? Pain relievers.  ? Antihistamines.  ? Antidepressants.  ? Blood pressure medicines.  ? Water pills (diuretics).  ? Ulcer medicines.  ? Muscle relaxants.  ? Drugs.  · Excessive drinking.  · Psychological causes, such as:  ? Anxiety.  ? Depression.  ? Sadness.  ? Exhaustion.  ? Performance fear.  ? Stress.  · Physical causes, such as:  ? Artery problems. This may include diabetes, smoking, liver disease, or atherosclerosis.  ? High blood pressure.  ? Hormonal problems, such as low testosterone.  ? Obesity.  ? Nerve problems. This may include back or pelvic injuries, diabetes mellitus, multiple sclerosis, or Parkinson disease.  What are the signs or symptoms?  Symptoms of this condition include:  · Inability to get an erection.  · Lack of enough hardness of the erection to allow penetration.  · Loss of the erection before sex is finished.  · Normal erections at some times, but with frequent unsatisfactory episodes.  · Low sexual satisfaction in either partner due to erection problems.  · A curved penis occurring with erection. The curve may cause pain or the penis may be too curved  to allow for intercourse.  · Never having nighttime erections.  How is this diagnosed?  This condition is often diagnosed by:  · Performing a physical exam to find other diseases or specific problems with the penis.  · Asking you detailed questions about the problem.  · Performing blood tests to check for diabetes mellitus or to measure hormone levels.  · Performing other tests to check for underlying health conditions.  · Performing an ultrasound exam to check for scarring.  · Performing a test to check blood flow to the penis.  · Doing a sleep study at home to measure nighttime erections.  How is this treated?  This condition may be treated by:  · Medicine taken by mouth to help you achieve an erection (oral medicine).  · Hormone replacement therapy to replace low testosterone levels.  · Medicine that is injected into the penis. Your health care provider may instruct you how to give yourself these injections at home.  · Vacuum pump. This is a pump with a ring on it. The pump and ring are placed on the penis and used to create pressure that helps the penis become erect.  · Penile implant surgery. In this procedure, you may receive:  ? An inflatable implant. This consists of cylinders, a pump, and a reservoir. The cylinders can be inflated with a fluid that helps to create an erection, and they can be deflated after intercourse.  ? A semi-rigid implant. This consists of two silicone rubber rods. The rods provide some rigidity. They are also flexible, so the penis can both curve downward in its normal position and become straight for sexual intercourse.  · Blood vessel surgery, to improve blood flow to the penis. During this procedure, a blood vessel from a different part of the body is placed into the penis to allow blood to flow around (bypass) damaged or blocked blood vessels.  · Lifestyle changes, such as exercising more, losing weight, and quitting smoking.  Follow these instructions at  home:  Medicines    · Take over-the-counter and prescription medicines only as told by your health care provider. Do not increase the dosage without first discussing it with your health care provider.  · If you are using self-injections, perform injections as directed by your health care provider. Make sure to avoid any veins that are on the surface of the penis. After giving an injection, apply pressure to the injection site for 5 minutes.  General instructions  · Exercise regularly, as directed by your health care provider. Work with your health care provider to lose weight, if needed.  · Do not use any products that contain nicotine or tobacco, such as cigarettes and e-cigarettes. If you need help quitting, ask your health care provider.  · Before using a vacuum pump, read the instructions that come with the pump and discuss any questions with your health care provider.  · Keep all follow-up visits as told by your health care provider. This is important.  Contact a health care provider if:  · You feel nauseous.  · You vomit.  Get help right away if:  · You are taking oral or injectable medicines and you have an erection that lasts longer than 4 hours. If your health care provider is unavailable, go to the nearest emergency room for evaluation. An erection that lasts much longer than 4 hours can result in permanent damage to your penis.  · You have severe pain in your groin or abdomen.  · You develop redness or severe swelling of your penis.  · You have redness spreading up into your groin or lower abdomen.  · You are unable to urinate.  · You experience chest pain or a rapid heart beat (palpitations) after taking oral medicines.  Summary  · Erectile dysfunction (ED) is the inability to get or keep an erection during sexual intercourse. This problem can usually be treated successfully.  · This condition is diagnosed based on a physical exam, your symptoms, and tests to determine the cause. Treatment varies  depending on the cause, and may include medicines, hormone therapy, surgery, or vacuum pump.  · You may need follow-up visits to make sure that you are using your medicines or devices correctly.  · Get help right away if you are taking or injecting medicines and you have an erection that lasts longer than 4 hours.  This information is not intended to replace advice given to you by your health care provider. Make sure you discuss any questions you have with your health care provider.  Document Released: 12/15/2001 Document Revised: 01/03/2018 Document Reviewed: 01/03/2018  [employer]vier Interactive Patient Education © 2020 [employer]

## 2020-04-07 NOTE — PATIENT INSTRUCTIONS
cialis as needed for sexual activity  Continue all current medications.  Your refills have been sent to your pharmacy  Monitor blood sugars closely at least 2-3 times daily.  If your blood sugars are staying consistently greater than 150 mg/dL please contact the office for adjustments to your medication regimen  Prescription for Basaglar 90 units has been sent to your pharmacy please titrate as needed  If your blood sugars fail to improve we can consider mealtime insulin especially during times of infection with your wound.  Hemoglobin A1c Test  Why am I having this test?  You may have the hemoglobin A1c test (HbA1c test) done to:  · Evaluate your risk for developing diabetes (diabetes mellitus).  · Diagnose diabetes.  · Monitor long-term control of blood sugar (glucose) in people who have diabetes and help make treatment decisions.  This test may be done with other blood glucose tests, such as fasting blood glucose and oral glucose tolerance tests.  What is being tested?  Hemoglobin is a type of protein in the blood that carries oxygen. Glucose attaches to hemoglobin to form glycated hemoglobin. This test checks the amount of glycated hemoglobin in your blood, which is a good indicator of the average amount of glucose in your blood during the past 2-3 months.  What kind of sample is taken?    A blood sample is required for this test. It is usually collected by inserting a needle into a blood vessel.  Tell a health care provider about:  · All medicines you are taking, including vitamins, herbs, eye drops, creams, and over-the-counter medicines.  · Any blood disorders you have.  · Any surgeries you have had.  · Any medical conditions you have.  · Whether you are pregnant or may be pregnant.  How are the results reported?  Your results will be reported as a percentage that indicates how much of your hemoglobin has glucose attached to it (is glycated). Your health care provider will compare your results to normal  ranges that were established after testing a large group of people (reference ranges). Reference ranges may vary among labs and hospitals. For this test, common reference ranges are:  · Adult or child without diabetes: 4-5.6%.  · Adult or child with diabetes and good blood glucose control: less than 7%.  What do the results mean?  If you have diabetes:  · A result of less than 7% is considered normal, meaning that your blood glucose is well controlled.  · A result higher than 7% means that your blood glucose is not well controlled, and your treatment plan may need to be adjusted.  If you do not have diabetes:  · A result within the reference range is considered normal, meaning that you are not at high risk for diabetes.  · A result of 5.7-6.4% means that you have a high risk of developing diabetes, and you may have prediabetes. Prediabetes is the condition of having a blood glucose level that is higher than it should be, but not high enough for you to be diagnosed with diabetes. Having prediabetes puts you at risk for developing type 2 diabetes (type 2 diabetes mellitus). You may have more tests, including a repeat HbA1c test.  · Results of 6.5% or higher on two separate HbA1c tests mean that you have diabetes. You may have more tests to confirm the diagnosis.  Abnormally low HbA1c values may be caused by:  · Pregnancy.  · Severe blood loss.  · Receiving donated blood (transfusions).  · Low red blood cell count (anemia).  · Long-term kidney failure.  · Some unusual forms (variants) of hemoglobin.  Talk with your health care provider about what your results mean.  Questions to ask your health care provider  Ask your health care provider, or the department that is doing the test:  · When will my results be ready?  · How will I get my results?  · What are my treatment options?  · What other tests do I need?  · What are my next steps?  Summary  · The hemoglobin A1c test (HbA1c test) may be done to evaluate your risk for  developing diabetes, to diagnose diabetes, and to monitor long-term control of blood sugar (glucose) in people who have diabetes and help make treatment decisions.  · Hemoglobin is a type of protein in the blood that carries oxygen. Glucose attaches to hemoglobin to form glycated hemoglobin. This test checks the amount of glycated hemoglobin in your blood, which is a good indicator of the average amount of glucose in your blood during the past 2-3 months.  · Talk with your health care provider about what your results mean.  This information is not intended to replace advice given to you by your health care provider. Make sure you discuss any questions you have with your health care provider.  Document Released: 01/09/2006 Document Revised: 07/31/2018 Document Reviewed: 07/31/2018  [employer] Interactive Patient Education © 2020 [employer]  Tadalafil tablets (Cialis)  What is this medicine?  TADALAFIL (manjit DA la esteban) is used to treat erection problems in men. It is also used for enlargement of the prostate gland in men, a condition called benign prostatic hyperplasia or BPH. This medicine improves urine flow and reduces BPH symptoms. This medicine can also treat both erection problems and BPH when they occur together.  This medicine may be used for other purposes; ask your health care provider or pharmacist if you have questions.  COMMON BRAND NAME(S): Adcirca, ALYQ, Cialis  What should I tell my health care provider before I take this medicine?  They need to know if you have any of these conditions:  -bleeding disorders  -eye or vision problems, including a rare inherited eye disease called retinitis pigmentosa  -anatomical deformation of the penis, Peyronie's disease, or history of priapism (painful and prolonged erection)  -heart disease, angina, a history of heart attack, irregular heart beats, or other heart problems  -high or low blood pressure  -history of blood diseases, like sickle cell anemia or  leukemia  -history of stomach bleeding  -kidney disease  -liver disease  -stroke  -an unusual or allergic reaction to tadalafil, other medicines, foods, dyes, or preservatives  -pregnant or trying to get pregnant  -breast-feeding  How should I use this medicine?  Take this medicine by mouth with a glass of water. Follow the directions on the prescription label. You may take this medicine with or without meals. When this medicine is used for erection problems, your doctor may prescribe it to be taken once daily or as needed. If you are taking the medicine as needed, you may be able to have sexual activity 30 minutes after taking it and for up to 36 hours after taking it. Whether you are taking the medicine as needed or once daily, you should not take more than one dose per day. If you are taking this medicine for symptoms of benign prostatic hyperplasia (BPH) or to treat both BPH and an erection problem, take the dose once daily at about the same time each day. Do not take your medicine more often than directed.  Talk to your pediatrician regarding the use of this medicine in children. Special care may be needed.  Overdosage: If you think you have taken too much of this medicine contact a poison control center or emergency room at once.  NOTE: This medicine is only for you. Do not share this medicine with others.  What if I miss a dose?  If you are taking this medicine as needed for erection problems, this does not apply. If you miss a dose while taking this medicine once daily for an erection problem, benign prostatic hyperplasia, or both, take it as soon as you remember, but do not take more than one dose per day.  What may interact with this medicine?  Do not take this medicine with any of the following medications:  -nitrates like amyl nitrite, isosorbide dinitrate, isosorbide mononitrate, nitroglycerin  -other medicines for erectile dysfunction like avanafil, sildenafil, vardenafil  -other tadalafil products  (Adcirca)  -riociguat  This medicine may also interact with the following medications:  -certain drugs for high blood pressure  -certain drugs for the treatment of HIV infection or AIDS  -certain drugs used for fungal or yeast infections, like fluconazole, itraconazole, ketoconazole, and voriconazole  -certain drugs used for seizures like carbamazepine, phenytoin, and phenobarbital  -grapefruit juice  -macrolide antibiotics like clarithromycin, erythromycin, troleandomycin  -medicines for prostate problems  -rifabutin, rifampin or rifapentine  This list may not describe all possible interactions. Give your health care provider a list of all the medicines, herbs, non-prescription drugs, or dietary supplements you use. Also tell them if you smoke, drink alcohol, or use illegal drugs. Some items may interact with your medicine.  What should I watch for while using this medicine?  If you notice any changes in your vision while taking this drug, call your doctor or health care professional as soon as possible. Stop using this medicine and call your health care provider right away if you have a loss of sight in one or both eyes.  Contact your doctor or health care professional right away if the erection lasts longer than 4 hours or if it becomes painful. This may be a sign of serious problem and must be treated right away to prevent permanent damage.  If you experience symptoms of nausea, dizziness, chest pain or arm pain upon initiation of sexual activity after taking this medicine, you should refrain from further activity and call your doctor or health care professional as soon as possible.  Do not drink alcohol to excess (examples, 5 glasses of wine or 5 shots of whiskey) when taking this medicine. When taken in excess, alcohol can increase your chances of getting a headache or getting dizzy, increasing your heart rate or lowering your blood pressure.  Using this medicine does not protect you or your partner against  HIV infection (the virus that causes AIDS) or other sexually transmitted diseases.  What side effects may I notice from receiving this medicine?  Side effects that you should report to your doctor or health care professional as soon as possible:  -allergic reactions like skin rash, itching or hives, swelling of the face, lips, or tongue  -breathing problems  -changes in hearing  -changes in vision  -chest pain  -fast, irregular heartbeat  -prolonged or painful erection  -seizures  Side effects that usually do not require medical attention (report to your doctor or health care professional if they continue or are bothersome):  -back pain  -dizziness  -flushing  -headache  -indigestion  -muscle aches  -nausea  -stuffy or runny nose  This list may not describe all possible side effects. Call your doctor for medical advice about side effects. You may report side effects to FDA at 9-903-FDA-9850.  Where should I keep my medicine?  Keep out of the reach of children.  Store at room temperature between 15 and 30 degrees C (59 and 86 degrees F). Throw away any unused medicine after the expiration date.  NOTE: This sheet is a summary. It may not cover all possible information. If you have questions about this medicine, talk to your doctor, pharmacist, or health care provider.  © 2020 [employer] (2015-05-08 13:15:49)    Erectile Dysfunction  Erectile dysfunction (ED) is the inability to get or keep an erection in order to have sexual intercourse. Erectile dysfunction may include:  · Inability to get an erection.  · Lack of enough hardness of the erection to allow penetration.  · Loss of the erection before sex is finished.  What are the causes?  This condition may be caused by:  · Certain medicines, such as:  ? Pain relievers.  ? Antihistamines.  ? Antidepressants.  ? Blood pressure medicines.  ? Water pills (diuretics).  ? Ulcer medicines.  ? Muscle relaxants.  ? Drugs.  · Excessive drinking.  · Psychological  causes, such as:  ? Anxiety.  ? Depression.  ? Sadness.  ? Exhaustion.  ? Performance fear.  ? Stress.  · Physical causes, such as:  ? Artery problems. This may include diabetes, smoking, liver disease, or atherosclerosis.  ? High blood pressure.  ? Hormonal problems, such as low testosterone.  ? Obesity.  ? Nerve problems. This may include back or pelvic injuries, diabetes mellitus, multiple sclerosis, or Parkinson disease.  What are the signs or symptoms?  Symptoms of this condition include:  · Inability to get an erection.  · Lack of enough hardness of the erection to allow penetration.  · Loss of the erection before sex is finished.  · Normal erections at some times, but with frequent unsatisfactory episodes.  · Low sexual satisfaction in either partner due to erection problems.  · A curved penis occurring with erection. The curve may cause pain or the penis may be too curved to allow for intercourse.  · Never having nighttime erections.  How is this diagnosed?  This condition is often diagnosed by:  · Performing a physical exam to find other diseases or specific problems with the penis.  · Asking you detailed questions about the problem.  · Performing blood tests to check for diabetes mellitus or to measure hormone levels.  · Performing other tests to check for underlying health conditions.  · Performing an ultrasound exam to check for scarring.  · Performing a test to check blood flow to the penis.  · Doing a sleep study at home to measure nighttime erections.  How is this treated?  This condition may be treated by:  · Medicine taken by mouth to help you achieve an erection (oral medicine).  · Hormone replacement therapy to replace low testosterone levels.  · Medicine that is injected into the penis. Your health care provider may instruct you how to give yourself these injections at home.  · Vacuum pump. This is a pump with a ring on it. The pump and ring are placed on the penis and used to create pressure  that helps the penis become erect.  · Penile implant surgery. In this procedure, you may receive:  ? An inflatable implant. This consists of cylinders, a pump, and a reservoir. The cylinders can be inflated with a fluid that helps to create an erection, and they can be deflated after intercourse.  ? A semi-rigid implant. This consists of two silicone rubber rods. The rods provide some rigidity. They are also flexible, so the penis can both curve downward in its normal position and become straight for sexual intercourse.  · Blood vessel surgery, to improve blood flow to the penis. During this procedure, a blood vessel from a different part of the body is placed into the penis to allow blood to flow around (bypass) damaged or blocked blood vessels.  · Lifestyle changes, such as exercising more, losing weight, and quitting smoking.  Follow these instructions at home:  Medicines    · Take over-the-counter and prescription medicines only as told by your health care provider. Do not increase the dosage without first discussing it with your health care provider.  · If you are using self-injections, perform injections as directed by your health care provider. Make sure to avoid any veins that are on the surface of the penis. After giving an injection, apply pressure to the injection site for 5 minutes.  General instructions  · Exercise regularly, as directed by your health care provider. Work with your health care provider to lose weight, if needed.  · Do not use any products that contain nicotine or tobacco, such as cigarettes and e-cigarettes. If you need help quitting, ask your health care provider.  · Before using a vacuum pump, read the instructions that come with the pump and discuss any questions with your health care provider.  · Keep all follow-up visits as told by your health care provider. This is important.  Contact a health care provider if:  · You feel nauseous.  · You vomit.  Get help right away if:  · You  are taking oral or injectable medicines and you have an erection that lasts longer than 4 hours. If your health care provider is unavailable, go to the nearest emergency room for evaluation. An erection that lasts much longer than 4 hours can result in permanent damage to your penis.  · You have severe pain in your groin or abdomen.  · You develop redness or severe swelling of your penis.  · You have redness spreading up into your groin or lower abdomen.  · You are unable to urinate.  · You experience chest pain or a rapid heart beat (palpitations) after taking oral medicines.  Summary  · Erectile dysfunction (ED) is the inability to get or keep an erection during sexual intercourse. This problem can usually be treated successfully.  · This condition is diagnosed based on a physical exam, your symptoms, and tests to determine the cause. Treatment varies depending on the cause, and may include medicines, hormone therapy, surgery, or vacuum pump.  · You may need follow-up visits to make sure that you are using your medicines or devices correctly.  · Get help right away if you are taking or injecting medicines and you have an erection that lasts longer than 4 hours.  This information is not intended to replace advice given to you by your health care provider. Make sure you discuss any questions you have with your health care provider.  Document Released: 12/15/2001 Document Revised: 01/03/2018 Document Reviewed: 01/03/2018  [employer] Interactive Patient Education © 2020 [employer]

## 2020-04-09 ENCOUNTER — OFFICE VISIT (OUTPATIENT)
Dept: WOUND CARE | Facility: HOSPITAL | Age: 35
End: 2020-04-09

## 2020-04-09 PROCEDURE — G0463 HOSPITAL OUTPT CLINIC VISIT: HCPCS

## 2020-04-13 ENCOUNTER — LAB (OUTPATIENT)
Dept: LAB | Facility: HOSPITAL | Age: 35
End: 2020-04-13

## 2020-04-13 ENCOUNTER — OFFICE VISIT (OUTPATIENT)
Dept: WOUND CARE | Facility: HOSPITAL | Age: 35
End: 2020-04-13

## 2020-04-13 ENCOUNTER — TRANSCRIBE ORDERS (OUTPATIENT)
Dept: ADMINISTRATIVE | Facility: HOSPITAL | Age: 35
End: 2020-04-13

## 2020-04-13 ENCOUNTER — HOSPITAL ENCOUNTER (OUTPATIENT)
Dept: GENERAL RADIOLOGY | Facility: HOSPITAL | Age: 35
Discharge: HOME | End: 2020-04-13
Admitting: NURSE PRACTITIONER

## 2020-04-13 DIAGNOSIS — E11.9 TYPE 2 DIABETES MELLITUS WITHOUT COMPLICATION, WITHOUT LONG-TERM CURRENT USE OF INSULIN (HCC): ICD-10-CM

## 2020-04-13 DIAGNOSIS — E78.5 DYSLIPIDEMIA: ICD-10-CM

## 2020-04-13 DIAGNOSIS — M14.671 CHARCOT'S JOINT OF ANKLE, RIGHT: ICD-10-CM

## 2020-04-13 DIAGNOSIS — E55.9 VITAMIN D DEFICIENCY: ICD-10-CM

## 2020-04-13 DIAGNOSIS — IMO0002 UNCONTROLLED DIABETES MELLITUS WITH FOOT ULCER: Primary | ICD-10-CM

## 2020-04-13 DIAGNOSIS — E55.9 VITAMIN D DEFICIENCY DISEASE: Primary | ICD-10-CM

## 2020-04-13 DIAGNOSIS — L97.522 ULCER OF LEFT FOOT, WITH FAT LAYER EXPOSED (HCC): ICD-10-CM

## 2020-04-13 DIAGNOSIS — L97.522 NON-PRESSURE CHRONIC ULCER OF OTHER PART OF LEFT FOOT WITH FAT LAYER EXPOSED (HCC): ICD-10-CM

## 2020-04-13 DIAGNOSIS — IMO0002 UNCONTROLLED DIABETES MELLITUS WITH FOOT ULCER: ICD-10-CM

## 2020-04-13 DIAGNOSIS — N52.9 ERECTILE DISORDER, ACQUIRED, SITUATIONAL, SEVERE: ICD-10-CM

## 2020-04-13 DIAGNOSIS — I10 ESSENTIAL HYPERTENSION: ICD-10-CM

## 2020-04-13 LAB
25(OH)D3 SERPL-MCNC: 15.6 NG/ML (ref 30–100)
ALBUMIN SERPL-MCNC: 4.3 G/DL (ref 3.5–5.2)
ALBUMIN UR-MCNC: <1.2 MG/DL
ALBUMIN/GLOB SERPL: 1.2 G/DL
ALP SERPL-CCNC: 96 U/L (ref 39–117)
ALT SERPL W P-5'-P-CCNC: 38 U/L (ref 1–41)
ANION GAP SERPL CALCULATED.3IONS-SCNC: 12.7 MMOL/L (ref 5–15)
AST SERPL-CCNC: 18 U/L (ref 1–40)
BASOPHILS # BLD AUTO: 0.06 10*3/MM3 (ref 0–0.2)
BASOPHILS NFR BLD AUTO: 0.5 % (ref 0–1.5)
BILIRUB SERPL-MCNC: 0.5 MG/DL (ref 0.2–1.2)
BUN BLD-MCNC: 16 MG/DL (ref 6–20)
BUN/CREAT SERPL: 17.8 (ref 7–25)
CALCIUM SPEC-SCNC: 9.6 MG/DL (ref 8.6–10.5)
CHLORIDE SERPL-SCNC: 99 MMOL/L (ref 98–107)
CHOLEST SERPL-MCNC: 134 MG/DL (ref 0–200)
CO2 SERPL-SCNC: 26.3 MMOL/L (ref 22–29)
CREAT BLD-MCNC: 0.9 MG/DL (ref 0.76–1.27)
CRP SERPL-MCNC: 1.36 MG/DL (ref 0–0.5)
DEPRECATED RDW RBC AUTO: 38.9 FL (ref 37–54)
EOSINOPHIL # BLD AUTO: 0.2 10*3/MM3 (ref 0–0.4)
EOSINOPHIL NFR BLD AUTO: 1.8 % (ref 0.3–6.2)
ERYTHROCYTE [DISTWIDTH] IN BLOOD BY AUTOMATED COUNT: 12.8 % (ref 12.3–15.4)
ERYTHROCYTE [SEDIMENTATION RATE] IN BLOOD: 5 MM/HR (ref 0–15)
FSH SERPL-ACNC: 9.12 MIU/ML
GFR SERPL CREATININE-BSD FRML MDRD: 95 ML/MIN/1.73
GLOBULIN UR ELPH-MCNC: 3.5 GM/DL
GLUCOSE BLD-MCNC: 164 MG/DL (ref 65–99)
HBA1C MFR BLD: 8.1 % (ref 4.8–5.6)
HCT VFR BLD AUTO: 48.9 % (ref 37.5–51)
HDLC SERPL-MCNC: 39 MG/DL (ref 40–60)
HGB BLD-MCNC: 16.7 G/DL (ref 13–17.7)
IMM GRANULOCYTES # BLD AUTO: 0.05 10*3/MM3 (ref 0–0.05)
IMM GRANULOCYTES NFR BLD AUTO: 0.5 % (ref 0–0.5)
LDLC SERPL CALC-MCNC: 68 MG/DL (ref 0–100)
LDLC/HDLC SERPL: 1.75 {RATIO}
LH SERPL-ACNC: 9.51 MIU/ML
LYMPHOCYTES # BLD AUTO: 3.28 10*3/MM3 (ref 0.7–3.1)
LYMPHOCYTES NFR BLD AUTO: 29.7 % (ref 19.6–45.3)
MCH RBC QN AUTO: 29.3 PG (ref 26.6–33)
MCHC RBC AUTO-ENTMCNC: 34.2 G/DL (ref 31.5–35.7)
MCV RBC AUTO: 85.9 FL (ref 79–97)
MONOCYTES # BLD AUTO: 0.77 10*3/MM3 (ref 0.1–0.9)
MONOCYTES NFR BLD AUTO: 7 % (ref 5–12)
NEUTROPHILS # BLD AUTO: 6.69 10*3/MM3 (ref 1.7–7)
NEUTROPHILS NFR BLD AUTO: 60.5 % (ref 42.7–76)
NRBC BLD AUTO-RTO: 0 /100 WBC (ref 0–0.2)
PLATELET # BLD AUTO: 250 10*3/MM3 (ref 140–450)
PMV BLD AUTO: 9.7 FL (ref 6–12)
POTASSIUM BLD-SCNC: 4.1 MMOL/L (ref 3.5–5.2)
PREALB SERPL-MCNC: 20.3 MG/DL (ref 20–40)
PROT SERPL-MCNC: 7.8 G/DL (ref 6–8.5)
RBC # BLD AUTO: 5.69 10*6/MM3 (ref 4.14–5.8)
SODIUM BLD-SCNC: 138 MMOL/L (ref 136–145)
T-UPTAKE NFR SERPL: 1.12 TBI (ref 0.8–1.3)
T3FREE SERPL-MCNC: 3.72 PG/ML (ref 2–4.4)
T4 FREE SERPL-MCNC: 1.2 NG/DL (ref 0.93–1.7)
T4 SERPL-MCNC: 9.33 MCG/DL (ref 4.5–11.7)
TRIGL SERPL-MCNC: 133 MG/DL (ref 0–150)
TSH SERPL DL<=0.05 MIU/L-ACNC: 1.18 UIU/ML (ref 0.27–4.2)
VLDLC SERPL-MCNC: 26.6 MG/DL (ref 5–40)
WBC NRBC COR # BLD: 11.05 10*3/MM3 (ref 3.4–10.8)

## 2020-04-13 PROCEDURE — 84439 ASSAY OF FREE THYROXINE: CPT

## 2020-04-13 PROCEDURE — 84402 ASSAY OF FREE TESTOSTERONE: CPT

## 2020-04-13 PROCEDURE — 85652 RBC SED RATE AUTOMATED: CPT

## 2020-04-13 PROCEDURE — 86140 C-REACTIVE PROTEIN: CPT

## 2020-04-13 PROCEDURE — 85025 COMPLETE CBC W/AUTO DIFF WBC: CPT

## 2020-04-13 PROCEDURE — 84479 ASSAY OF THYROID (T3 OR T4): CPT

## 2020-04-13 PROCEDURE — 83002 ASSAY OF GONADOTROPIN (LH): CPT

## 2020-04-13 PROCEDURE — 83001 ASSAY OF GONADOTROPIN (FSH): CPT

## 2020-04-13 PROCEDURE — 73630 X-RAY EXAM OF FOOT: CPT

## 2020-04-13 PROCEDURE — 82043 UR ALBUMIN QUANTITATIVE: CPT

## 2020-04-13 PROCEDURE — 83036 HEMOGLOBIN GLYCOSYLATED A1C: CPT

## 2020-04-13 PROCEDURE — 80053 COMPREHEN METABOLIC PANEL: CPT

## 2020-04-13 PROCEDURE — 82306 VITAMIN D 25 HYDROXY: CPT

## 2020-04-13 PROCEDURE — 84681 ASSAY OF C-PEPTIDE: CPT

## 2020-04-13 PROCEDURE — G0463 HOSPITAL OUTPT CLINIC VISIT: HCPCS

## 2020-04-13 PROCEDURE — 84443 ASSAY THYROID STIM HORMONE: CPT

## 2020-04-13 PROCEDURE — 36415 COLL VENOUS BLD VENIPUNCTURE: CPT

## 2020-04-13 PROCEDURE — 84481 FREE ASSAY (FT-3): CPT

## 2020-04-13 PROCEDURE — 84134 ASSAY OF PREALBUMIN: CPT

## 2020-04-13 PROCEDURE — 80061 LIPID PANEL: CPT

## 2020-04-13 PROCEDURE — 84403 ASSAY OF TOTAL TESTOSTERONE: CPT

## 2020-04-13 RX ORDER — ERGOCALCIFEROL 1.25 MG/1
CAPSULE ORAL
Qty: 24 CAPSULE | Refills: 1 | Status: SHIPPED | OUTPATIENT
Start: 2020-04-13 | End: 2020-08-31

## 2020-04-14 LAB — C PEPTIDE SERPL-MCNC: 3.8 NG/ML (ref 1.1–4.4)

## 2020-04-15 LAB
TESTOST FREE SERPL-MCNC: 9.6 PG/ML (ref 8.7–25.1)
TESTOST SERPL-MCNC: 366.1 NG/DL (ref 264–916)

## 2020-04-16 ENCOUNTER — LAB REQUISITION (OUTPATIENT)
Dept: LAB | Facility: HOSPITAL | Age: 35
End: 2020-04-16

## 2020-04-16 ENCOUNTER — OFFICE VISIT (OUTPATIENT)
Dept: WOUND CARE | Facility: HOSPITAL | Age: 35
End: 2020-04-16

## 2020-04-16 DIAGNOSIS — E11.621 TYPE 2 DIABETES MELLITUS WITH FOOT ULCER (CODE) (HCC): ICD-10-CM

## 2020-04-16 PROCEDURE — 87070 CULTURE OTHR SPECIMN AEROBIC: CPT | Performed by: NURSE PRACTITIONER

## 2020-04-16 PROCEDURE — 87147 CULTURE TYPE IMMUNOLOGIC: CPT | Performed by: NURSE PRACTITIONER

## 2020-04-16 PROCEDURE — G0463 HOSPITAL OUTPT CLINIC VISIT: HCPCS

## 2020-04-16 PROCEDURE — 87205 SMEAR GRAM STAIN: CPT | Performed by: NURSE PRACTITIONER

## 2020-04-16 PROCEDURE — 87186 SC STD MICRODIL/AGAR DIL: CPT | Performed by: NURSE PRACTITIONER

## 2020-04-16 PROCEDURE — 87075 CULTR BACTERIA EXCEPT BLOOD: CPT | Performed by: NURSE PRACTITIONER

## 2020-04-18 LAB
BACTERIA SPEC AEROBE CULT: ABNORMAL
GRAM STN SPEC: ABNORMAL
GRAM STN SPEC: ABNORMAL

## 2020-04-20 ENCOUNTER — OFFICE VISIT (OUTPATIENT)
Dept: WOUND CARE | Facility: HOSPITAL | Age: 35
End: 2020-04-20

## 2020-04-21 LAB — BACTERIA SPEC ANAEROBE CULT: NORMAL

## 2020-04-23 ENCOUNTER — OFFICE VISIT (OUTPATIENT)
Dept: WOUND CARE | Facility: HOSPITAL | Age: 35
End: 2020-04-23

## 2020-04-23 PROCEDURE — G0463 HOSPITAL OUTPT CLINIC VISIT: HCPCS

## 2020-04-27 ENCOUNTER — OFFICE VISIT (OUTPATIENT)
Dept: WOUND CARE | Facility: HOSPITAL | Age: 35
End: 2020-04-27

## 2020-04-29 ENCOUNTER — OFFICE VISIT (OUTPATIENT)
Dept: WOUND CARE | Facility: HOSPITAL | Age: 35
End: 2020-04-29

## 2020-05-01 ENCOUNTER — OFFICE VISIT (OUTPATIENT)
Dept: WOUND CARE | Facility: HOSPITAL | Age: 35
End: 2020-05-01

## 2020-05-01 PROCEDURE — 97605 NEG PRS WND THER DME<=50SQCM: CPT

## 2020-05-04 ENCOUNTER — OFFICE VISIT (OUTPATIENT)
Dept: WOUND CARE | Facility: HOSPITAL | Age: 35
End: 2020-05-04

## 2020-05-04 PROCEDURE — 97605 NEG PRS WND THER DME<=50SQCM: CPT

## 2020-05-06 ENCOUNTER — OFFICE VISIT (OUTPATIENT)
Dept: WOUND CARE | Facility: HOSPITAL | Age: 35
End: 2020-05-06

## 2020-05-06 ENCOUNTER — LAB REQUISITION (OUTPATIENT)
Dept: LAB | Facility: HOSPITAL | Age: 35
End: 2020-05-06

## 2020-05-06 DIAGNOSIS — E11.621 TYPE 2 DIABETES MELLITUS WITH FOOT ULCER (CODE) (HCC): ICD-10-CM

## 2020-05-06 PROCEDURE — 87075 CULTR BACTERIA EXCEPT BLOOD: CPT | Performed by: NURSE PRACTITIONER

## 2020-05-06 PROCEDURE — 87205 SMEAR GRAM STAIN: CPT | Performed by: NURSE PRACTITIONER

## 2020-05-06 PROCEDURE — 87070 CULTURE OTHR SPECIMN AEROBIC: CPT | Performed by: NURSE PRACTITIONER

## 2020-05-06 PROCEDURE — 87147 CULTURE TYPE IMMUNOLOGIC: CPT | Performed by: NURSE PRACTITIONER

## 2020-05-06 PROCEDURE — 87015 SPECIMEN INFECT AGNT CONCNTJ: CPT | Performed by: NURSE PRACTITIONER

## 2020-05-06 PROCEDURE — 87186 SC STD MICRODIL/AGAR DIL: CPT | Performed by: NURSE PRACTITIONER

## 2020-05-08 ENCOUNTER — OFFICE VISIT (OUTPATIENT)
Dept: WOUND CARE | Facility: HOSPITAL | Age: 35
End: 2020-05-08

## 2020-05-08 ENCOUNTER — APPOINTMENT (OUTPATIENT)
Dept: WOUND CARE | Facility: HOSPITAL | Age: 35
End: 2020-05-08

## 2020-05-08 LAB
BACTERIA SPEC AEROBE CULT: ABNORMAL
GRAM STN SPEC: ABNORMAL
GRAM STN SPEC: ABNORMAL

## 2020-05-11 ENCOUNTER — OFFICE VISIT (OUTPATIENT)
Dept: WOUND CARE | Facility: HOSPITAL | Age: 35
End: 2020-05-11

## 2020-05-11 LAB — BACTERIA SPEC ANAEROBE CULT: NORMAL

## 2020-05-11 PROCEDURE — 97605 NEG PRS WND THER DME<=50SQCM: CPT

## 2020-05-13 ENCOUNTER — OFFICE VISIT (OUTPATIENT)
Dept: WOUND CARE | Facility: HOSPITAL | Age: 35
End: 2020-05-13

## 2020-05-13 PROCEDURE — 97605 NEG PRS WND THER DME<=50SQCM: CPT

## 2020-05-15 ENCOUNTER — OFFICE VISIT (OUTPATIENT)
Dept: WOUND CARE | Facility: HOSPITAL | Age: 35
End: 2020-05-15

## 2020-05-15 PROCEDURE — 97605 NEG PRS WND THER DME<=50SQCM: CPT

## 2020-05-18 ENCOUNTER — OFFICE VISIT (OUTPATIENT)
Dept: WOUND CARE | Facility: HOSPITAL | Age: 35
End: 2020-05-18

## 2020-05-18 PROCEDURE — 97605 NEG PRS WND THER DME<=50SQCM: CPT

## 2020-05-20 ENCOUNTER — OFFICE VISIT (OUTPATIENT)
Dept: WOUND CARE | Facility: HOSPITAL | Age: 35
End: 2020-05-20

## 2020-05-20 PROCEDURE — 97605 NEG PRS WND THER DME<=50SQCM: CPT

## 2020-05-22 ENCOUNTER — APPOINTMENT (OUTPATIENT)
Dept: WOUND CARE | Facility: HOSPITAL | Age: 35
End: 2020-05-22

## 2020-05-26 ENCOUNTER — APPOINTMENT (OUTPATIENT)
Dept: WOUND CARE | Facility: HOSPITAL | Age: 35
End: 2020-05-26

## 2020-05-27 ENCOUNTER — OFFICE VISIT (OUTPATIENT)
Dept: WOUND CARE | Facility: HOSPITAL | Age: 35
End: 2020-05-27

## 2020-06-01 ENCOUNTER — OFFICE VISIT (OUTPATIENT)
Dept: WOUND CARE | Facility: HOSPITAL | Age: 35
End: 2020-06-01

## 2020-06-01 DIAGNOSIS — E11.9 TYPE 2 DIABETES MELLITUS WITHOUT COMPLICATION, WITHOUT LONG-TERM CURRENT USE OF INSULIN (HCC): Primary | ICD-10-CM

## 2020-06-01 DIAGNOSIS — E55.9 VITAMIN D DEFICIENCY DISEASE: ICD-10-CM

## 2020-06-01 PROCEDURE — G0463 HOSPITAL OUTPT CLINIC VISIT: HCPCS

## 2020-06-22 RX ORDER — INSULIN GLARGINE 100 [IU]/ML
INJECTION, SOLUTION SUBCUTANEOUS
Qty: 30 ML | Refills: 2 | Status: SHIPPED | OUTPATIENT
Start: 2020-06-22 | End: 2020-06-25 | Stop reason: SDUPTHER

## 2020-06-25 RX ORDER — INSULIN GLARGINE 100 [IU]/ML
INJECTION, SOLUTION SUBCUTANEOUS
Qty: 9 PEN | Refills: 2 | Status: SHIPPED | OUTPATIENT
Start: 2020-06-25 | End: 2020-10-09

## 2020-07-02 ENCOUNTER — RESULTS ENCOUNTER (OUTPATIENT)
Dept: ENDOCRINOLOGY | Age: 35
End: 2020-07-02

## 2020-07-02 DIAGNOSIS — E11.9 TYPE 2 DIABETES MELLITUS WITHOUT COMPLICATION, WITHOUT LONG-TERM CURRENT USE OF INSULIN (HCC): ICD-10-CM

## 2020-07-02 DIAGNOSIS — E55.9 VITAMIN D DEFICIENCY DISEASE: ICD-10-CM

## 2020-08-31 RX ORDER — ERGOCALCIFEROL 1.25 MG/1
CAPSULE ORAL
Qty: 24 CAPSULE | Refills: 0 | Status: SHIPPED | OUTPATIENT
Start: 2020-08-31 | End: 2020-12-21

## 2020-09-15 ENCOUNTER — TELEPHONE (OUTPATIENT)
Dept: ENDOCRINOLOGY | Age: 35
End: 2020-09-15

## 2020-09-15 NOTE — TELEPHONE ENCOUNTER
----- Message from Shanelle Villavicencio MA sent at 9/14/2020  3:41 PM EDT -----  Patient saw Mita dean. He would like a call to discuss his medications.

## 2020-10-08 RX ORDER — PEN NEEDLE, DIABETIC 31 GX5/16"
NEEDLE, DISPOSABLE MISCELLANEOUS
Qty: 100 EACH | Refills: 2 | Status: SHIPPED | OUTPATIENT
Start: 2020-10-08

## 2020-10-09 RX ORDER — INSULIN GLARGINE 100 [IU]/ML
INJECTION, SOLUTION SUBCUTANEOUS
Qty: 30 PEN | Refills: 1 | Status: SHIPPED | OUTPATIENT
Start: 2020-10-09 | End: 2021-03-12

## 2020-10-15 LAB
25(OH)D3+25(OH)D2 SERPL-MCNC: 44.2 NG/ML (ref 30–100)
ALBUMIN SERPL-MCNC: 4.3 G/DL (ref 4–5)
ALBUMIN/GLOB SERPL: 1.3 {RATIO} (ref 1.2–2.2)
ALP SERPL-CCNC: 125 IU/L (ref 39–117)
ALT SERPL-CCNC: 27 IU/L (ref 0–44)
AMBIG ABBREV CMP14 DEFAULT: NORMAL
AST SERPL-CCNC: 18 IU/L (ref 0–40)
BILIRUB SERPL-MCNC: 0.4 MG/DL (ref 0–1.2)
BUN SERPL-MCNC: 21 MG/DL (ref 6–20)
BUN/CREAT SERPL: 23 (ref 9–20)
C PEPTIDE SERPL-MCNC: 2.9 NG/ML (ref 1.1–4.4)
CALCIUM SERPL-MCNC: 9.7 MG/DL (ref 8.7–10.2)
CHLORIDE SERPL-SCNC: 101 MMOL/L (ref 96–106)
CHOLEST SERPL-MCNC: 167 MG/DL (ref 100–199)
CO2 SERPL-SCNC: 23 MMOL/L (ref 20–29)
CORTIS SERPL-MCNC: 8.7 UG/DL
CREAT SERPL-MCNC: 0.91 MG/DL (ref 0.76–1.27)
FT4I SERPL CALC-MCNC: 2.3 (ref 1.2–4.9)
GLOBULIN SER CALC-MCNC: 3.2 G/DL (ref 1.5–4.5)
GLUCOSE SERPL-MCNC: 171 MG/DL (ref 65–99)
HBA1C MFR BLD: 9.2 % (ref 4.8–5.6)
HDLC SERPL-MCNC: 47 MG/DL
INTERPRETATION: NORMAL
LDLC SERPL CALC-MCNC: 96 MG/DL (ref 0–99)
Lab: NORMAL
MICROALBUMIN UR-MCNC: 10.2 UG/ML
POTASSIUM SERPL-SCNC: 4.2 MMOL/L (ref 3.5–5.2)
PROT SERPL-MCNC: 7.5 G/DL (ref 6–8.5)
SODIUM SERPL-SCNC: 138 MMOL/L (ref 134–144)
T3RU NFR SERPL: 25 % (ref 24–39)
T4 SERPL-MCNC: 9.1 UG/DL (ref 4.5–12)
TRIGL SERPL-MCNC: 133 MG/DL (ref 0–149)
TSH SERPL DL<=0.005 MIU/L-ACNC: 1.64 UIU/ML (ref 0.45–4.5)
VLDLC SERPL CALC-MCNC: 24 MG/DL (ref 5–40)

## 2020-10-19 ENCOUNTER — RESULTS ENCOUNTER (OUTPATIENT)
Dept: ENDOCRINOLOGY | Age: 35
End: 2020-10-19

## 2020-10-19 DIAGNOSIS — I10 ESSENTIAL HYPERTENSION: ICD-10-CM

## 2020-10-19 DIAGNOSIS — E11.9 TYPE 2 DIABETES MELLITUS WITHOUT COMPLICATION, WITHOUT LONG-TERM CURRENT USE OF INSULIN (HCC): ICD-10-CM

## 2020-10-19 DIAGNOSIS — E55.9 VITAMIN D DEFICIENCY DISEASE: ICD-10-CM

## 2020-10-21 RX ORDER — LISINOPRIL AND HYDROCHLOROTHIAZIDE 25; 20 MG/1; MG/1
TABLET ORAL
Qty: 90 TABLET | Refills: 0 | Status: SHIPPED | OUTPATIENT
Start: 2020-10-21

## 2020-11-17 ENCOUNTER — TELEPHONE (OUTPATIENT)
Dept: ENDOCRINOLOGY | Age: 35
End: 2020-11-17

## 2020-11-17 RX ORDER — TADALAFIL 20 MG/1
20 TABLET ORAL AS NEEDED
Qty: 6 TABLET | Refills: 0 | Status: SHIPPED | OUTPATIENT
Start: 2020-11-17 | End: 2021-03-12 | Stop reason: SDUPTHER

## 2020-11-17 RX ORDER — TADALAFIL 20 MG/1
20 TABLET, FILM COATED ORAL DAILY PRN
Qty: 6 TABLET | Refills: 0 | Status: SHIPPED | OUTPATIENT
Start: 2020-11-17 | End: 2020-11-17

## 2020-11-17 NOTE — TELEPHONE ENCOUNTER
Pt called in and stated hat he wants something fo his Erectile dysfunction. He was prescribed tadafil 4/2020. He never received it. Can you resend again          nv 3/21 raj galvan 4/20 raj

## 2020-12-21 RX ORDER — ERGOCALCIFEROL 1.25 MG/1
CAPSULE ORAL
Qty: 24 CAPSULE | Refills: 0 | Status: SHIPPED | OUTPATIENT
Start: 2020-12-21 | End: 2021-04-08

## 2021-03-12 ENCOUNTER — OFFICE VISIT (OUTPATIENT)
Dept: ENDOCRINOLOGY | Age: 36
End: 2021-03-12

## 2021-03-12 VITALS
HEART RATE: 97 BPM | DIASTOLIC BLOOD PRESSURE: 70 MMHG | WEIGHT: 315 LBS | TEMPERATURE: 97.4 F | BODY MASS INDEX: 40.43 KG/M2 | SYSTOLIC BLOOD PRESSURE: 118 MMHG | RESPIRATION RATE: 18 BRPM | OXYGEN SATURATION: 99 % | HEIGHT: 74 IN

## 2021-03-12 DIAGNOSIS — E78.5 DYSLIPIDEMIA: ICD-10-CM

## 2021-03-12 DIAGNOSIS — Z79.4 TYPE 2 DIABETES MELLITUS WITH HYPERGLYCEMIA, WITH LONG-TERM CURRENT USE OF INSULIN (HCC): Primary | ICD-10-CM

## 2021-03-12 DIAGNOSIS — N52.9 ERECTILE DISORDER, ACQUIRED, SITUATIONAL, SEVERE: ICD-10-CM

## 2021-03-12 DIAGNOSIS — E55.9 VITAMIN D DEFICIENCY DISEASE: ICD-10-CM

## 2021-03-12 DIAGNOSIS — E11.65 TYPE 2 DIABETES MELLITUS WITH HYPERGLYCEMIA, WITH LONG-TERM CURRENT USE OF INSULIN (HCC): Primary | ICD-10-CM

## 2021-03-12 DIAGNOSIS — I10 ESSENTIAL HYPERTENSION: ICD-10-CM

## 2021-03-12 PROCEDURE — 99214 OFFICE O/P EST MOD 30 MIN: CPT | Performed by: NURSE PRACTITIONER

## 2021-03-12 RX ORDER — INSULIN GLARGINE 100 [IU]/ML
90 INJECTION, SOLUTION SUBCUTANEOUS DAILY
Qty: 27 PEN | Refills: 1 | Status: SHIPPED | OUTPATIENT
Start: 2021-03-12 | End: 2021-04-14 | Stop reason: SDUPTHER

## 2021-03-12 RX ORDER — DAPAGLIFLOZIN 10 MG/1
10 TABLET, FILM COATED ORAL DAILY
Qty: 90 TABLET | Refills: 1 | Status: SHIPPED | OUTPATIENT
Start: 2021-03-12 | End: 2021-04-14 | Stop reason: SDUPTHER

## 2021-03-12 RX ORDER — TADALAFIL 20 MG/1
20 TABLET ORAL AS NEEDED
Qty: 6 TABLET | Refills: 2 | Status: SHIPPED | OUTPATIENT
Start: 2021-03-12 | End: 2022-03-12

## 2021-03-12 NOTE — PATIENT INSTRUCTIONS
Prescription for Cialis 20 mg to use as needed prior to sexual activity has been sent to your pharmacy  Urology referral for erectile dysfunction has been placed  Labs pending  Continue all current medications pending labs  Bring meter each visit

## 2021-03-12 NOTE — PROGRESS NOTES
"  Niki Welsh is a 35 y.o. male. he is here today for follow-up.  Chief Complaint   Patient presents with   • Diabetes     /70   Pulse 97   Temp 97.4 °F (36.3 °C)   Resp 18   Ht 188 cm (74\")   Wt (!) 145 kg (320 lb)   SpO2 99%   BMI 41.09 kg/m²       History of Present Illness   Encounter Diagnoses   Name Primary?   • Type 2 diabetes mellitus with hyperglycemia, with long-term current use of insulin (CMS/[facility]) Yes   • Vitamin D deficiency disease    • Essential hypertension    • Dyslipidemia    35-year-old male patient here today for follow-up visit.  He has been seen for the above-mentioned problems.  He has been prescribed Cialis in the past however he was unable to get it through his insurance.  New prescription has been sent.  He complains of severe erectile dysfunction and is very stressed.  He is requesting referral to urology.  He will have labs following today's visit and is requesting that we also check his cortisol level.  He has not been getting much exercise during the pandemic.  He is also limited with mobility due to having a diabetic foot ulcer on his left foot.  He follows with a wound care specialist and recently had surgery to repair some bone deformity which has helped to progress wound healing.  He is not fully healed yet.  He is unable to exercise due to limited mobility.  He denies any hypoglycemic events.  He also states his blood sugars are typically in the mid 100 range.     The following portions of the patient's history were reviewed and updated as appropriate:   Past Medical History:   Diagnosis Date   • Asthma    • Diabetes (CMS/HCC)    • Hyperlipidemia    • Hypertension    • Optic nerve hemorrhage, left      Past Surgical History:   Procedure Laterality Date   • TONSILLECTOMY AND ADENOIDECTOMY       Family History   Problem Relation Age of Onset   • Diabetes Father    • Hypertension Maternal Grandmother    • Hypertension Maternal Grandfather    • Diabetes " Paternal Grandmother    • Diabetes Paternal Grandfather        Current Outpatient Medications   Medication Sig Dispense Refill   • aspirin 81 MG EC tablet TAKE (1) TABLET DAILY 30 tablet 11   • atorvastatin (LIPITOR) 20 MG tablet Take 1 tablet by mouth Daily. 90 tablet 1   • Blood Glucose Monitoring Suppl (ACCU-CHEK MANAV PLUS) w/Device kit Dx e11.9 use to check BS BID ok to substitute formulary preferred 1 kit 0   • Dapagliflozin Propanediol (Farxiga) 10 MG tablet Take 10 mg by mouth Daily. 90 tablet 1   • glucose blood (ACCU-CHEK MANAV PLUS) test strip Dx e11.9 use to check BS BID ok to substitute formulary preferred 60 each 11   • glucose blood test strip accucheck strips daily e11.9 100 each 12   • Insulin Glargine (BASAGLAR KWIKPEN) 100 UNIT/ML injection pen INJECT 90 UNITS UNDER THE SKIN INTO THE APPROPRIATE AREA AS DIRECTED 30 pen 1   • Insulin Pen Needle (B-D ULTRAFINE III SHORT PEN) 31G X 8 MM misc USE EVERY MORNING FOR INSULIN INJECTION 100 each 2   • Lancets (ACCU-CHEK MULTICLIX) lancets Dx e11.9 use to check BS BID ok to substitute formulary preferred 60 each 11   • lisinopril-hydrochlorothiazide (PRINZIDE,ZESTORETIC) 20-25 MG per tablet TAKE (1) TABLET DAILY 90 tablet 0   • tadalafil (CIALIS) 20 MG tablet Take 1 tablet by mouth As Needed for Erectile Dysfunction. 6 tablet 0   • vitamin D (ERGOCALCIFEROL) 1.25 MG (04197 UT) capsule capsule TAKE 1 CAPSULE TWICE A WEEK 24 capsule 0   • Wound Dressings ([facility] WOUND/BURN DRESSING) paste APPLY AS DIRECTED 44 mL 3     No current facility-administered medications for this visit.     Allergies   Allergen Reactions   • Metformin Other (See Comments)     Fatigue and muscle aches     Social History     Socioeconomic History   • Marital status: Single     Spouse name: Not on file   • Number of children: Not on file   • Years of education: Not on file   • Highest education level: Not on file   Tobacco Use   • Smoking status: Never Smoker   • Smokeless tobacco:  "Never Used   Vaping Use   • Vaping Use: Never used   Substance and Sexual Activity   • Alcohol use: No   • Drug use: No   • Sexual activity: Defer       Review of Systems  Review of Systems   Constitutional: Negative for appetite change and fatigue.   Eyes: Negative for visual disturbance.   Respiratory: Negative for cough.    Cardiovascular: Negative for leg swelling.   Gastrointestinal: Negative for constipation and diarrhea.   Endocrine: Negative for cold intolerance, heat intolerance, polydipsia, polyphagia and polyuria.   Genitourinary: Negative for frequency.   Neurological: Negative for numbness.        Objective    /70   Pulse 97   Temp 97.4 °F (36.3 °C)   Resp 18   Ht 188 cm (74\")   Wt (!) 145 kg (320 lb)   SpO2 99%   BMI 41.09 kg/m²   Physical Exam  Vitals and nursing note reviewed.   Constitutional:       General: He is not in acute distress.     Appearance: Normal appearance. He is well-developed. He is obese. He is not diaphoretic.   HENT:      Head: Normocephalic and atraumatic.      Right Ear: External ear normal.      Left Ear: External ear normal.      Nose: Nose normal.   Eyes:      General:         Right eye: No discharge.         Left eye: No discharge.      Pupils: Pupils are equal, round, and reactive to light.   Neck:      Thyroid: No thyromegaly.      Vascular: No carotid bruit.      Trachea: No tracheal deviation.   Cardiovascular:      Rate and Rhythm: Normal rate and regular rhythm.      Heart sounds: Normal heart sounds. No murmur. No friction rub. No gallop.    Pulmonary:      Effort: Pulmonary effort is normal. No respiratory distress.      Breath sounds: Normal breath sounds. No wheezing or rales.   Abdominal:      General: Bowel sounds are normal. There is no distension.      Palpations: Abdomen is soft.      Tenderness: There is no abdominal tenderness.   Musculoskeletal:         General: No deformity. Normal range of motion.      Cervical back: Normal range of motion " and neck supple. No edema or erythema.   Lymphadenopathy:      Cervical: No cervical adenopathy.   Skin:     General: Skin is warm and dry.      Coloration: Skin is not pale.      Findings: No erythema or rash.   Neurological:      Mental Status: He is alert and oriented to person, place, and time.      Coordination: Coordination normal.   Psychiatric:         Behavior: Behavior normal.         Thought Content: Thought content normal.         Judgment: Judgment normal.         Lab Review  Glucose (mg/dL)   Date Value   04/13/2020 164 (H)   01/09/2020 136 (H)   11/30/2019 204 (H)     Sodium (mmol/L)   Date Value   10/14/2020 138   04/13/2020 138   01/09/2020 133 (L)   11/30/2019 140     Potassium (mmol/L)   Date Value   10/14/2020 4.2   04/13/2020 4.1   01/09/2020 3.8   11/30/2019 3.7     Chloride (mmol/L)   Date Value   10/14/2020 101   04/13/2020 99   01/09/2020 97 (L)   11/30/2019 101     CO2 (mmol/L)   Date Value   04/13/2020 26.3   01/09/2020 22.5   11/30/2019 24.7     Total CO2 (mmol/L)   Date Value   10/14/2020 23     BUN (mg/dL)   Date Value   10/14/2020 21 (H)   04/13/2020 16   01/09/2020 29 (H)   11/30/2019 21 (H)     Creatinine (mg/dL)   Date Value   10/14/2020 0.91   04/13/2020 0.90   01/09/2020 0.88   11/30/2019 0.68 (L)     Hemoglobin A1C (%)   Date Value   10/14/2020 9.2 (H)   04/13/2020 8.10 (H)   10/17/2019 10.30 (H)   08/20/2019 9.70 (H)   10/10/2018 9.99 (H)     Triglycerides (mg/dL)   Date Value   10/14/2020 133   04/13/2020 133   10/17/2019 157 (H)   10/10/2018 185 (H)   03/22/2018 99     LDL Cholesterol  (mg/dL)   Date Value   04/13/2020 68   10/10/2018 130 (H)   03/22/2018 132 (H)     LDL Chol Calc (NIH) (mg/dL)   Date Value   10/14/2020 96       Assessment/Plan      1. Type 2 diabetes mellitus with hyperglycemia, with long-term current use of insulin (CMS/[facility])    2. Vitamin D deficiency disease    3. Essential hypertension    4. Dyslipidemia    .    Medications prescribed:  Outpatient  Encounter Medications as of 3/12/2021   Medication Sig Dispense Refill   • aspirin 81 MG EC tablet TAKE (1) TABLET DAILY 30 tablet 11   • atorvastatin (LIPITOR) 20 MG tablet Take 1 tablet by mouth Daily. 90 tablet 1   • Blood Glucose Monitoring Suppl (ACCU-CHEK MANAV PLUS) w/Device kit Dx e11.9 use to check BS BID ok to substitute formulary preferred 1 kit 0   • Dapagliflozin Propanediol (Farxiga) 10 MG tablet Take 10 mg by mouth Daily. 90 tablet 1   • glucose blood (ACCU-CHEK MANAV PLUS) test strip Dx e11.9 use to check BS BID ok to substitute formulary preferred 60 each 11   • glucose blood test strip accucheck strips daily e11.9 100 each 12   • Insulin Glargine (BASAGLAR KWIKPEN) 100 UNIT/ML injection pen INJECT 90 UNITS UNDER THE SKIN INTO THE APPROPRIATE AREA AS DIRECTED 30 pen 1   • Insulin Pen Needle (B-D ULTRAFINE III SHORT PEN) 31G X 8 MM misc USE EVERY MORNING FOR INSULIN INJECTION 100 each 2   • Lancets (ACCU-CHEK MULTICLIX) lancets Dx e11.9 use to check BS BID ok to substitute formulary preferred 60 each 11   • lisinopril-hydrochlorothiazide (PRINZIDE,ZESTORETIC) 20-25 MG per tablet TAKE (1) TABLET DAILY 90 tablet 0   • tadalafil (CIALIS) 20 MG tablet Take 1 tablet by mouth As Needed for Erectile Dysfunction. 6 tablet 0   • vitamin D (ERGOCALCIFEROL) 1.25 MG (23307 UT) capsule capsule TAKE 1 CAPSULE TWICE A WEEK 24 capsule 0   • Wound Dressings ([facility] WOUND/BURN DRESSING) paste APPLY AS DIRECTED 44 mL 3   • [DISCONTINUED] gabapentin (NEURONTIN) 300 MG capsule Take 300 mg by mouth 3 (Three) Times a Day.     • [DISCONTINUED] HYDROcodone-acetaminophen (NORCO) 5-325 MG per tablet Take 1 tablet by mouth Every 4 (Four) Hours As Needed for Moderate Pain . 12 tablet 0   • [DISCONTINUED] ondansetron ODT (ZOFRAN-ODT) 4 MG disintegrating tablet Take 1 tablet by mouth Every 6 (Six) Hours As Needed for Nausea or Vomiting. 12 tablet 0     No facility-administered encounter medications on file as of 3/12/2021.        Orders placed during this encounter include:  No orders of the defined types were placed in this encounter.  Patient was seen and examined.  He will have extensive labs following today's visit will be notified of the results along with any further recommendations.  He has been referred to urology.  Prescription for Cialis has been sent to his pharmacy.  His medication list was reviewed and updated for accuracy.  He is requesting a refill on his insulin.  He does at times vary the amount of insulin he takes depending on his activity level.  He has not been very active during the pandemic and due to having a diabetic foot ulcer.  He is taking his Farxiga as prescribed.  He denies any hypoglycemic events.  Blood pressures in satisfactory range.  He will be notified of his lab results along with any further recommendations for changes.  He will follow-up with a new provider in 6 months    Prescription for Cialis 20 mg to use as needed prior to sexual activity has been sent to your pharmacy  Urology referral for erectile dysfunction has been placed  Labs pending  Continue all current medications pending labs  Bring meter each visit

## 2021-03-18 LAB
25(OH)D3+25(OH)D2 SERPL-MCNC: 40.5 NG/ML (ref 30–100)
ACTH PLAS-MCNC: 33.9 PG/ML (ref 7.2–63.3)
ALBUMIN SERPL-MCNC: 4.3 G/DL (ref 3.5–5.2)
ALBUMIN/GLOB SERPL: 1.3 G/DL
ALP SERPL-CCNC: 132 U/L (ref 39–117)
ALT SERPL-CCNC: 30 U/L (ref 1–41)
AST SERPL-CCNC: 21 U/L (ref 1–40)
BILIRUB SERPL-MCNC: 0.2 MG/DL (ref 0–1.2)
BUN SERPL-MCNC: 24 MG/DL (ref 6–20)
BUN/CREAT SERPL: 21.6 (ref 7–25)
C PEPTIDE SERPL-MCNC: 6.2 NG/ML (ref 1.1–4.4)
CALCIUM SERPL-MCNC: 9.6 MG/DL (ref 8.6–10.5)
CHLORIDE SERPL-SCNC: 100 MMOL/L (ref 98–107)
CHOLEST SERPL-MCNC: 168 MG/DL (ref 0–200)
CO2 SERPL-SCNC: 26.2 MMOL/L (ref 22–29)
CORTIS SERPL-MCNC: 11.6 UG/DL
CREAT SERPL-MCNC: 1.11 MG/DL (ref 0.76–1.27)
FSH SERPL-ACNC: 8.6 MIU/ML (ref 1.5–12.4)
GLOBULIN SER CALC-MCNC: 3.3 GM/DL
GLUCOSE SERPL-MCNC: 284 MG/DL (ref 65–99)
HBA1C MFR BLD: 9.4 % (ref 4.8–5.6)
HDLC SERPL-MCNC: 43 MG/DL (ref 40–60)
IMP & REVIEW OF LAB RESULTS: NORMAL
LDLC SERPL CALC-MCNC: 96 MG/DL (ref 0–100)
LH SERPL-ACNC: 13.3 MIU/ML (ref 1.7–8.6)
Lab: NORMAL
POTASSIUM SERPL-SCNC: 5 MMOL/L (ref 3.5–5.2)
PROT SERPL-MCNC: 7.6 G/DL (ref 6–8.5)
SODIUM SERPL-SCNC: 137 MMOL/L (ref 136–145)
T3FREE SERPL-MCNC: 2.9 PG/ML (ref 2–4.4)
T4 FREE SERPL-MCNC: 1.25 NG/DL (ref 0.93–1.7)
TESTOST FREE SERPL-MCNC: 6.9 PG/ML (ref 8.7–25.1)
TESTOST SERPL-MCNC: 243.6 NG/DL (ref 264–916)
TRIGL SERPL-MCNC: 166 MG/DL (ref 0–150)
TSH SERPL DL<=0.005 MIU/L-ACNC: 1.68 UIU/ML (ref 0.27–4.2)
VLDLC SERPL CALC-MCNC: 29 MG/DL (ref 5–40)

## 2021-03-21 RX ORDER — DULAGLUTIDE 0.75 MG/.5ML
0.75 INJECTION, SOLUTION SUBCUTANEOUS WEEKLY
Qty: 2 ML | Refills: 5 | Status: SHIPPED | OUTPATIENT
Start: 2021-03-21 | End: 2021-04-20

## 2021-03-22 ENCOUNTER — PRIOR AUTHORIZATION (OUTPATIENT)
Dept: ENDOCRINOLOGY | Age: 36
End: 2021-03-22

## 2021-03-22 ENCOUNTER — TELEPHONE (OUTPATIENT)
Dept: ENDOCRINOLOGY | Age: 36
End: 2021-03-22

## 2021-04-06 ENCOUNTER — TELEPHONE (OUTPATIENT)
Dept: ENDOCRINOLOGY | Age: 36
End: 2021-04-06

## 2021-04-06 NOTE — TELEPHONE ENCOUNTER
[phone]      Patient was given trulicity   When he went to pharmacy to get basgalar and farixga he was given trulicity and he knew nothing about    hasnt taking it for three weeds    He wants a return call to let him know what to take and when to take    Thank you    He states he has tried to call a few times

## 2021-04-08 ENCOUNTER — TELEPHONE (OUTPATIENT)
Dept: ENDOCRINOLOGY | Age: 36
End: 2021-04-08

## 2021-04-08 RX ORDER — ERGOCALCIFEROL 1.25 MG/1
CAPSULE ORAL
Qty: 24 CAPSULE | Refills: 0 | Status: SHIPPED | OUTPATIENT
Start: 2021-04-08 | End: 2021-04-14 | Stop reason: SDUPTHER

## 2021-04-08 NOTE — TELEPHONE ENCOUNTER
Pt needs a PA for the medications  [place]   Lancets  farxiga  basgalar  trulicty and the vitamin d       Send to [facility] pharmacy on [address]     Pt states this needs an emergency order called in     Patient is out of all his medication     Please call mom briana gm    [phone]

## 2021-04-08 NOTE — TELEPHONE ENCOUNTER
Colin with [facility] called in patient has appt tomorrow needed office notes and labs faxed to his attn [phone]

## 2021-04-12 RX ORDER — PEN NEEDLE, DIABETIC 31 GX5/16"
NEEDLE, DISPOSABLE MISCELLANEOUS
Qty: 100 EACH | Refills: 1 | Status: SHIPPED | OUTPATIENT
Start: 2021-04-12

## 2021-04-12 NOTE — TELEPHONE ENCOUNTER
Patients basaglar is not covered by insurance. Lantus solostar, levemir flextouch and novolog is covered. Can we switch insulin. He needs a 90 day supply.     I submitted the PA for basaglar.     Next visit 9/14/21 tristin    Last visit 3/12/21 raj  Next visit 9/14/21 tristin

## 2021-04-13 NOTE — TELEPHONE ENCOUNTER
Needs 90 day supply for farxicarmina cox and cira and vitamin d     Send to [facility] pharmacy

## 2021-04-13 NOTE — TELEPHONE ENCOUNTER
Spoke with patient yesterday,  Submitted Pa fo basaglar. trulicity and farxiga have refills and was just picked up from pharmacy

## 2021-04-14 RX ORDER — ERGOCALCIFEROL 1.25 MG/1
50000 CAPSULE ORAL 2 TIMES WEEKLY
Qty: 24 CAPSULE | Refills: 1 | Status: SHIPPED | OUTPATIENT
Start: 2021-04-15

## 2021-04-14 RX ORDER — GLUCOSAM/CHON-MSM1/C/MANG/BOSW 500-416.6
TABLET ORAL
Qty: 100 EACH | Refills: 1 | Status: SHIPPED | OUTPATIENT
Start: 2021-04-14

## 2021-04-14 RX ORDER — INSULIN GLARGINE 100 [IU]/ML
90 INJECTION, SOLUTION SUBCUTANEOUS DAILY
Qty: 90 ML | Refills: 1 | Status: SHIPPED | OUTPATIENT
Start: 2021-04-14 | End: 2023-03-28 | Stop reason: HOSPADM

## 2021-04-14 RX ORDER — DAPAGLIFLOZIN 10 MG/1
10 TABLET, FILM COATED ORAL DAILY
Qty: 90 TABLET | Refills: 1 | Status: SHIPPED | OUTPATIENT
Start: 2021-04-14

## 2021-04-14 RX ORDER — CALCIUM CITRATE/VITAMIN D3 200MG-6.25
TABLET ORAL
Qty: 100 EACH | Refills: 1 | Status: SHIPPED | OUTPATIENT
Start: 2021-04-14

## 2021-04-16 ENCOUNTER — BULK ORDERING (OUTPATIENT)
Dept: CASE MANAGEMENT | Facility: OTHER | Age: 36
End: 2021-04-16

## 2021-04-16 DIAGNOSIS — Z23 IMMUNIZATION DUE: ICD-10-CM

## 2022-03-08 RX ORDER — ERGOCALCIFEROL 1.25 MG/1
CAPSULE ORAL
Qty: 24 CAPSULE | Refills: 1 | OUTPATIENT
Start: 2022-03-08

## 2022-03-23 RX ORDER — ERGOCALCIFEROL 1.25 MG/1
CAPSULE ORAL
Qty: 24 CAPSULE | Refills: 1 | OUTPATIENT
Start: 2022-03-23

## 2022-05-24 ENCOUNTER — TELEPHONE (OUTPATIENT)
Dept: ENDOCRINOLOGY | Age: 37
End: 2022-05-24

## 2023-02-08 ENCOUNTER — HOSPITAL ENCOUNTER (OUTPATIENT)
Facility: HOSPITAL | Age: 38
Setting detail: OBSERVATION
Discharge: HOME | End: 2023-02-11
Attending: EMERGENCY MEDICINE | Admitting: STUDENT IN AN ORGANIZED HEALTH CARE EDUCATION/TRAINING PROGRAM
Payer: MEDICAID

## 2023-02-08 ENCOUNTER — APPOINTMENT (OUTPATIENT)
Dept: CT IMAGING | Facility: HOSPITAL | Age: 38
End: 2023-02-08
Payer: MEDICAID

## 2023-02-08 DIAGNOSIS — E11.65 TYPE 2 DIABETES MELLITUS WITH HYPERGLYCEMIA, WITH LONG-TERM CURRENT USE OF INSULIN: ICD-10-CM

## 2023-02-08 DIAGNOSIS — M54.42 ACUTE LEFT-SIDED LOW BACK PAIN WITH LEFT-SIDED SCIATICA: ICD-10-CM

## 2023-02-08 DIAGNOSIS — Z79.4 TYPE 2 DIABETES MELLITUS WITH HYPERGLYCEMIA, WITH LONG-TERM CURRENT USE OF INSULIN: ICD-10-CM

## 2023-02-08 DIAGNOSIS — D72.829 LEUKOCYTOSIS, UNSPECIFIED TYPE: ICD-10-CM

## 2023-02-08 DIAGNOSIS — R10.9 LEFT SIDED ABDOMINAL PAIN: Primary | ICD-10-CM

## 2023-02-08 LAB
ALBUMIN SERPL-MCNC: 4.3 G/DL (ref 3.5–5.2)
ALBUMIN/GLOB SERPL: 1.2 G/DL
ALP SERPL-CCNC: 140 U/L (ref 39–117)
ALT SERPL W P-5'-P-CCNC: 23 U/L (ref 1–41)
ANION GAP SERPL CALCULATED.3IONS-SCNC: 12.3 MMOL/L (ref 5–15)
AST SERPL-CCNC: 13 U/L (ref 1–40)
BACTERIA UR QL AUTO: NORMAL /HPF
BASOPHILS # BLD AUTO: 0.06 10*3/MM3 (ref 0–0.2)
BASOPHILS NFR BLD AUTO: 0.3 % (ref 0–1.5)
BILIRUB SERPL-MCNC: 0.4 MG/DL (ref 0–1.2)
BILIRUB UR QL STRIP: NEGATIVE
BUN SERPL-MCNC: 21 MG/DL (ref 6–20)
BUN/CREAT SERPL: 22.8 (ref 7–25)
CALCIUM SPEC-SCNC: 9.5 MG/DL (ref 8.6–10.5)
CHLORIDE SERPL-SCNC: 97 MMOL/L (ref 98–107)
CLARITY UR: CLEAR
CO2 SERPL-SCNC: 25.7 MMOL/L (ref 22–29)
COLOR UR: YELLOW
CREAT SERPL-MCNC: 0.92 MG/DL (ref 0.76–1.27)
D DIMER PPP FEU-MCNC: 0.5 MCGFEU/ML (ref 0–0.5)
DEPRECATED RDW RBC AUTO: 37.3 FL (ref 37–54)
EGFRCR SERPLBLD CKD-EPI 2021: 108.5 ML/MIN/1.73
EOSINOPHIL # BLD AUTO: 0.03 10*3/MM3 (ref 0–0.4)
EOSINOPHIL NFR BLD AUTO: 0.2 % (ref 0.3–6.2)
ERYTHROCYTE [DISTWIDTH] IN BLOOD BY AUTOMATED COUNT: 12.1 % (ref 12.3–15.4)
GLOBULIN UR ELPH-MCNC: 3.7 GM/DL
GLUCOSE SERPL-MCNC: 174 MG/DL (ref 65–99)
GLUCOSE UR STRIP-MCNC: ABNORMAL MG/DL
HBA1C MFR BLD: 7.9 % (ref 4.8–5.6)
HCT VFR BLD AUTO: 48.6 % (ref 37.5–51)
HGB BLD-MCNC: 16.6 G/DL (ref 13–17.7)
HGB UR QL STRIP.AUTO: NEGATIVE
HOLD SPECIMEN: NORMAL
HOLD SPECIMEN: NORMAL
HYALINE CASTS UR QL AUTO: NORMAL /LPF
IMM GRANULOCYTES # BLD AUTO: 0.08 10*3/MM3 (ref 0–0.05)
IMM GRANULOCYTES NFR BLD AUTO: 0.4 % (ref 0–0.5)
KETONES UR QL STRIP: ABNORMAL
LEUKOCYTE ESTERASE UR QL STRIP.AUTO: NEGATIVE
LIPASE SERPL-CCNC: 16 U/L (ref 13–60)
LYMPHOCYTES # BLD AUTO: 2.08 10*3/MM3 (ref 0.7–3.1)
LYMPHOCYTES NFR BLD AUTO: 11.6 % (ref 19.6–45.3)
MCH RBC QN AUTO: 28.9 PG (ref 26.6–33)
MCHC RBC AUTO-ENTMCNC: 34.2 G/DL (ref 31.5–35.7)
MCV RBC AUTO: 84.7 FL (ref 79–97)
MONOCYTES # BLD AUTO: 1.18 10*3/MM3 (ref 0.1–0.9)
MONOCYTES NFR BLD AUTO: 6.6 % (ref 5–12)
NEUTROPHILS NFR BLD AUTO: 14.51 10*3/MM3 (ref 1.7–7)
NEUTROPHILS NFR BLD AUTO: 80.9 % (ref 42.7–76)
NITRITE UR QL STRIP: NEGATIVE
NRBC BLD AUTO-RTO: 0 /100 WBC (ref 0–0.2)
PH UR STRIP.AUTO: 5.5 [PH] (ref 5–8)
PLATELET # BLD AUTO: 283 10*3/MM3 (ref 140–450)
PMV BLD AUTO: 9.5 FL (ref 6–12)
POTASSIUM SERPL-SCNC: 4.2 MMOL/L (ref 3.5–5.2)
PROT SERPL-MCNC: 8 G/DL (ref 6–8.5)
PROT UR QL STRIP: ABNORMAL
QT INTERVAL: 346 MS
RBC # BLD AUTO: 5.74 10*6/MM3 (ref 4.14–5.8)
RBC # UR STRIP: NORMAL /HPF
REF LAB TEST METHOD: NORMAL
SODIUM SERPL-SCNC: 135 MMOL/L (ref 136–145)
SP GR UR STRIP: >=1.03 (ref 1–1.03)
SQUAMOUS #/AREA URNS HPF: NORMAL /HPF
TROPONIN T SERPL HS-MCNC: 12 NG/L
UROBILINOGEN UR QL STRIP: ABNORMAL
WBC # UR STRIP: NORMAL /HPF
WBC NRBC COR # BLD: 17.94 10*3/MM3 (ref 3.4–10.8)
WHOLE BLOOD HOLD COAG: NORMAL
WHOLE BLOOD HOLD SPECIMEN: NORMAL

## 2023-02-08 PROCEDURE — 81001 URINALYSIS AUTO W/SCOPE: CPT

## 2023-02-08 PROCEDURE — G0378 HOSPITAL OBSERVATION PER HR: HCPCS

## 2023-02-08 PROCEDURE — 25010000002 MORPHINE PER 10 MG: Performed by: EMERGENCY MEDICINE

## 2023-02-08 PROCEDURE — 85379 FIBRIN DEGRADATION QUANT: CPT | Performed by: PHYSICIAN ASSISTANT

## 2023-02-08 PROCEDURE — 72131 CT LUMBAR SPINE W/O DYE: CPT

## 2023-02-08 PROCEDURE — 93005 ELECTROCARDIOGRAM TRACING: CPT | Performed by: PHYSICIAN ASSISTANT

## 2023-02-08 PROCEDURE — 80053 COMPREHEN METABOLIC PANEL: CPT

## 2023-02-08 PROCEDURE — 25010000002 ONDANSETRON PER 1 MG: Performed by: PHYSICIAN ASSISTANT

## 2023-02-08 PROCEDURE — 93010 ELECTROCARDIOGRAM REPORT: CPT | Performed by: INTERNAL MEDICINE

## 2023-02-08 PROCEDURE — 84484 ASSAY OF TROPONIN QUANT: CPT

## 2023-02-08 PROCEDURE — 74176 CT ABD & PELVIS W/O CONTRAST: CPT

## 2023-02-08 PROCEDURE — 96375 TX/PRO/DX INJ NEW DRUG ADDON: CPT

## 2023-02-08 PROCEDURE — 85025 COMPLETE CBC W/AUTO DIFF WBC: CPT

## 2023-02-08 PROCEDURE — 83690 ASSAY OF LIPASE: CPT

## 2023-02-08 PROCEDURE — 99284 EMERGENCY DEPT VISIT MOD MDM: CPT

## 2023-02-08 PROCEDURE — 36415 COLL VENOUS BLD VENIPUNCTURE: CPT

## 2023-02-08 PROCEDURE — 96374 THER/PROPH/DIAG INJ IV PUSH: CPT

## 2023-02-08 PROCEDURE — 83036 HEMOGLOBIN GLYCOSYLATED A1C: CPT

## 2023-02-08 PROCEDURE — 72128 CT CHEST SPINE W/O DYE: CPT

## 2023-02-08 PROCEDURE — 84484 ASSAY OF TROPONIN QUANT: CPT | Performed by: PHYSICIAN ASSISTANT

## 2023-02-08 RX ORDER — ONDANSETRON 4 MG/1
4 TABLET, FILM COATED ORAL EVERY 6 HOURS PRN
Status: DISCONTINUED | OUTPATIENT
Start: 2023-02-08 | End: 2023-02-11 | Stop reason: HOSPADM

## 2023-02-08 RX ORDER — HYDROCODONE BITARTRATE AND ACETAMINOPHEN 5; 325 MG/1; MG/1
1 TABLET ORAL EVERY 6 HOURS PRN
Status: DISCONTINUED | OUTPATIENT
Start: 2023-02-08 | End: 2023-02-11 | Stop reason: HOSPADM

## 2023-02-08 RX ORDER — LISINOPRIL 20 MG/1
20 TABLET ORAL
Status: DISCONTINUED | OUTPATIENT
Start: 2023-02-09 | End: 2023-02-11 | Stop reason: HOSPADM

## 2023-02-08 RX ORDER — SODIUM CHLORIDE 0.9 % (FLUSH) 0.9 %
10 SYRINGE (ML) INJECTION AS NEEDED
Status: DISCONTINUED | OUTPATIENT
Start: 2023-02-08 | End: 2023-02-11 | Stop reason: HOSPADM

## 2023-02-08 RX ORDER — ONDANSETRON 2 MG/ML
4 INJECTION INTRAMUSCULAR; INTRAVENOUS EVERY 6 HOURS PRN
Status: DISCONTINUED | OUTPATIENT
Start: 2023-02-08 | End: 2023-02-11 | Stop reason: HOSPADM

## 2023-02-08 RX ORDER — DEXTROSE MONOHYDRATE 25 G/50ML
25 INJECTION, SOLUTION INTRAVENOUS
Status: DISCONTINUED | OUTPATIENT
Start: 2023-02-08 | End: 2023-02-11 | Stop reason: HOSPADM

## 2023-02-08 RX ORDER — METHOCARBAMOL 750 MG/1
750 TABLET, FILM COATED ORAL 4 TIMES DAILY
Status: DISCONTINUED | OUTPATIENT
Start: 2023-02-09 | End: 2023-02-08

## 2023-02-08 RX ORDER — ACETAMINOPHEN 325 MG/1
650 TABLET ORAL EVERY 4 HOURS PRN
Status: DISCONTINUED | OUTPATIENT
Start: 2023-02-08 | End: 2023-02-11 | Stop reason: HOSPADM

## 2023-02-08 RX ORDER — SODIUM CHLORIDE 9 MG/ML
40 INJECTION, SOLUTION INTRAVENOUS AS NEEDED
Status: DISCONTINUED | OUTPATIENT
Start: 2023-02-08 | End: 2023-02-11 | Stop reason: HOSPADM

## 2023-02-08 RX ORDER — MORPHINE SULFATE 2 MG/ML
4 INJECTION, SOLUTION INTRAMUSCULAR; INTRAVENOUS ONCE
Status: COMPLETED | OUTPATIENT
Start: 2023-02-08 | End: 2023-02-08

## 2023-02-08 RX ORDER — HYDROCHLOROTHIAZIDE 25 MG/1
25 TABLET ORAL
Status: DISCONTINUED | OUTPATIENT
Start: 2023-02-09 | End: 2023-02-11 | Stop reason: HOSPADM

## 2023-02-08 RX ORDER — NICOTINE POLACRILEX 4 MG
15 LOZENGE BUCCAL
Status: DISCONTINUED | OUTPATIENT
Start: 2023-02-08 | End: 2023-02-11 | Stop reason: HOSPADM

## 2023-02-08 RX ORDER — IBUPROFEN 600 MG/1
1 TABLET ORAL
Status: DISCONTINUED | OUTPATIENT
Start: 2023-02-08 | End: 2023-02-11 | Stop reason: HOSPADM

## 2023-02-08 RX ORDER — INSULIN LISPRO 100 [IU]/ML
0-14 INJECTION, SOLUTION INTRAVENOUS; SUBCUTANEOUS
Status: DISCONTINUED | OUTPATIENT
Start: 2023-02-09 | End: 2023-02-11 | Stop reason: HOSPADM

## 2023-02-08 RX ORDER — METHOCARBAMOL 750 MG/1
750 TABLET, FILM COATED ORAL 4 TIMES DAILY
Status: DISCONTINUED | OUTPATIENT
Start: 2023-02-09 | End: 2023-02-11 | Stop reason: HOSPADM

## 2023-02-08 RX ORDER — SODIUM CHLORIDE 0.9 % (FLUSH) 0.9 %
10 SYRINGE (ML) INJECTION EVERY 12 HOURS SCHEDULED
Status: DISCONTINUED | OUTPATIENT
Start: 2023-02-08 | End: 2023-02-11 | Stop reason: HOSPADM

## 2023-02-08 RX ORDER — ATORVASTATIN CALCIUM 20 MG/1
20 TABLET, FILM COATED ORAL DAILY
Status: DISCONTINUED | OUTPATIENT
Start: 2023-02-09 | End: 2023-02-11 | Stop reason: HOSPADM

## 2023-02-08 RX ORDER — ASPIRIN 81 MG/1
81 TABLET ORAL DAILY
Status: DISCONTINUED | OUTPATIENT
Start: 2023-02-09 | End: 2023-02-11 | Stop reason: HOSPADM

## 2023-02-08 RX ORDER — ONDANSETRON 2 MG/ML
4 INJECTION INTRAMUSCULAR; INTRAVENOUS ONCE
Status: COMPLETED | OUTPATIENT
Start: 2023-02-08 | End: 2023-02-08

## 2023-02-08 RX ADMIN — METHOCARBAMOL TABLETS 750 MG: 750 TABLET, COATED ORAL at 23:38

## 2023-02-08 RX ADMIN — Medication 10 ML: at 22:29

## 2023-02-08 RX ADMIN — MORPHINE SULFATE 4 MG: 2 INJECTION, SOLUTION INTRAMUSCULAR; INTRAVENOUS at 20:09

## 2023-02-08 RX ADMIN — HYDROCODONE BITARTRATE AND ACETAMINOPHEN 1 TABLET: 5; 325 TABLET ORAL at 23:29

## 2023-02-08 RX ADMIN — SODIUM CHLORIDE 500 ML: 9 INJECTION, SOLUTION INTRAVENOUS at 20:07

## 2023-02-08 RX ADMIN — ONDANSETRON 4 MG: 2 INJECTION INTRAMUSCULAR; INTRAVENOUS at 20:09

## 2023-02-08 NOTE — ED TRIAGE NOTES
Patient to ER via car from home for l flnak pain around to front  Patient denies any urinary symptoms      Patient wearing mask this RN in PPE

## 2023-02-09 ENCOUNTER — APPOINTMENT (OUTPATIENT)
Dept: CT IMAGING | Facility: HOSPITAL | Age: 38
End: 2023-02-09
Payer: MEDICAID

## 2023-02-09 ENCOUNTER — APPOINTMENT (OUTPATIENT)
Dept: MRI IMAGING | Facility: HOSPITAL | Age: 38
End: 2023-02-09
Payer: MEDICAID

## 2023-02-09 PROBLEM — M54.50 LEFT LOW BACK PAIN: Status: ACTIVE | Noted: 2023-02-09

## 2023-02-09 LAB
ANION GAP SERPL CALCULATED.3IONS-SCNC: 11.4 MMOL/L (ref 5–15)
BUN SERPL-MCNC: 17 MG/DL (ref 6–20)
BUN/CREAT SERPL: 23 (ref 7–25)
CALCIUM SPEC-SCNC: 9.3 MG/DL (ref 8.6–10.5)
CHLORIDE SERPL-SCNC: 100 MMOL/L (ref 98–107)
CO2 SERPL-SCNC: 23.6 MMOL/L (ref 22–29)
CREAT SERPL-MCNC: 0.74 MG/DL (ref 0.76–1.27)
CRP SERPL-MCNC: 10.32 MG/DL (ref 0–0.5)
D-LACTATE SERPL-SCNC: 1.2 MMOL/L (ref 0.5–2)
DEPRECATED RDW RBC AUTO: 39.1 FL (ref 37–54)
EGFRCR SERPLBLD CKD-EPI 2021: 118.2 ML/MIN/1.73
ERYTHROCYTE [DISTWIDTH] IN BLOOD BY AUTOMATED COUNT: 12.4 % (ref 12.3–15.4)
ERYTHROCYTE [SEDIMENTATION RATE] IN BLOOD: 66 MM/HR (ref 0–15)
GEN 5 2HR TROPONIN T REFLEX: 12 NG/L
GLUCOSE BLDC GLUCOMTR-MCNC: 135 MG/DL (ref 70–130)
GLUCOSE BLDC GLUCOMTR-MCNC: 150 MG/DL (ref 70–130)
GLUCOSE BLDC GLUCOMTR-MCNC: 195 MG/DL (ref 70–130)
GLUCOSE BLDC GLUCOMTR-MCNC: 246 MG/DL (ref 70–130)
GLUCOSE SERPL-MCNC: 154 MG/DL (ref 65–99)
HCT VFR BLD AUTO: 47.5 % (ref 37.5–51)
HGB BLD-MCNC: 16 G/DL (ref 13–17.7)
MCH RBC QN AUTO: 29.3 PG (ref 26.6–33)
MCHC RBC AUTO-ENTMCNC: 33.7 G/DL (ref 31.5–35.7)
MCV RBC AUTO: 86.8 FL (ref 79–97)
PLATELET # BLD AUTO: 286 10*3/MM3 (ref 140–450)
PMV BLD AUTO: 9.5 FL (ref 6–12)
POTASSIUM SERPL-SCNC: 4.1 MMOL/L (ref 3.5–5.2)
PROCALCITONIN SERPL-MCNC: 0.06 NG/ML (ref 0–0.25)
RBC # BLD AUTO: 5.47 10*6/MM3 (ref 4.14–5.8)
SODIUM SERPL-SCNC: 135 MMOL/L (ref 136–145)
TROPONIN T DELTA: NORMAL
WBC NRBC COR # BLD: 16.21 10*3/MM3 (ref 3.4–10.8)

## 2023-02-09 PROCEDURE — 74177 CT ABD & PELVIS W/CONTRAST: CPT

## 2023-02-09 PROCEDURE — 82962 GLUCOSE BLOOD TEST: CPT

## 2023-02-09 PROCEDURE — G0378 HOSPITAL OBSERVATION PER HR: HCPCS

## 2023-02-09 PROCEDURE — 71275 CT ANGIOGRAPHY CHEST: CPT

## 2023-02-09 PROCEDURE — 86140 C-REACTIVE PROTEIN: CPT | Performed by: STUDENT IN AN ORGANIZED HEALTH CARE EDUCATION/TRAINING PROGRAM

## 2023-02-09 PROCEDURE — 83605 ASSAY OF LACTIC ACID: CPT

## 2023-02-09 PROCEDURE — 80048 BASIC METABOLIC PNL TOTAL CA: CPT

## 2023-02-09 PROCEDURE — 85027 COMPLETE CBC AUTOMATED: CPT

## 2023-02-09 PROCEDURE — 0 IOPAMIDOL PER 1 ML: Performed by: EMERGENCY MEDICINE

## 2023-02-09 PROCEDURE — 84145 PROCALCITONIN (PCT): CPT

## 2023-02-09 PROCEDURE — 85652 RBC SED RATE AUTOMATED: CPT | Performed by: STUDENT IN AN ORGANIZED HEALTH CARE EDUCATION/TRAINING PROGRAM

## 2023-02-09 PROCEDURE — 87040 BLOOD CULTURE FOR BACTERIA: CPT

## 2023-02-09 RX ORDER — DIAZEPAM 2 MG/1
2 TABLET ORAL ONCE
Status: COMPLETED | OUTPATIENT
Start: 2023-02-09 | End: 2023-02-09

## 2023-02-09 RX ADMIN — METHOCARBAMOL TABLETS 750 MG: 750 TABLET, COATED ORAL at 09:05

## 2023-02-09 RX ADMIN — METHOCARBAMOL TABLETS 750 MG: 750 TABLET, COATED ORAL at 20:33

## 2023-02-09 RX ADMIN — HYDROCODONE BITARTRATE AND ACETAMINOPHEN 1 TABLET: 5; 325 TABLET ORAL at 22:01

## 2023-02-09 RX ADMIN — ATORVASTATIN CALCIUM 20 MG: 20 TABLET, FILM COATED ORAL at 09:06

## 2023-02-09 RX ADMIN — METHOCARBAMOL TABLETS 750 MG: 750 TABLET, COATED ORAL at 12:48

## 2023-02-09 RX ADMIN — INSULIN GLARGINE-YFGN 90 UNITS: 100 INJECTION, SOLUTION SUBCUTANEOUS at 09:05

## 2023-02-09 RX ADMIN — Medication 10 ML: at 20:33

## 2023-02-09 RX ADMIN — DIAZEPAM 2 MG: 2 TABLET ORAL at 22:11

## 2023-02-09 RX ADMIN — ASPIRIN 81 MG: 81 TABLET, COATED ORAL at 09:06

## 2023-02-09 RX ADMIN — LISINOPRIL 20 MG: 20 TABLET ORAL at 09:06

## 2023-02-09 RX ADMIN — HYDROCHLOROTHIAZIDE 25 MG: 25 TABLET ORAL at 09:06

## 2023-02-09 RX ADMIN — Medication 10 ML: at 09:06

## 2023-02-09 RX ADMIN — METHOCARBAMOL TABLETS 750 MG: 750 TABLET, COATED ORAL at 17:49

## 2023-02-09 RX ADMIN — HYDROCODONE BITARTRATE AND ACETAMINOPHEN 1 TABLET: 5; 325 TABLET ORAL at 06:12

## 2023-02-09 RX ADMIN — IOPAMIDOL 95 ML: 755 INJECTION, SOLUTION INTRAVENOUS at 10:24

## 2023-02-09 RX ADMIN — HYDROCODONE BITARTRATE AND ACETAMINOPHEN 1 TABLET: 5; 325 TABLET ORAL at 14:49

## 2023-02-09 NOTE — PROGRESS NOTES
ED OBSERVATION PROGRESS/DISCHARGE SUMMARY    Date of Admission: 2/8/2023   LOS: 0 days   PCP: Chuy Hays APRN      Subjective    No acute events overnight.  Patient states he is still having left back pain that radiates into the left side into the left lower stomach and down into his groin.  He states the pain is better while he is at rest however worsens with movement.  Admits to subjective fevers and chills.  Denies urinary and fecal incontinence.  Denies numbness and tingling.  Denies focal weakness.    Hospital Outcome:   37-year-old male admitted to the observation unit for further evaluation of left lower back pain and leukocytosis.  So far patient's work-up is unremarkable.  T scan of the thoracic and lumbar spine show degenerative changes.  CT scan of the abdomen and pelvis unremarkable.  Patient's leukocytosis persistent at 16,000 this morning.  Procalcitonin and lactic acid are normal.  CTA of the chest and CT of the abdomen pelvis with IV contrast shows no acute findings.  ESR and CRP elevated at 66 and 10.32 respectively.  Urinalysis with no evidence of infection  exam normal, low concern for Donna's gangrene, however concern for potential infection however unknown etiology at this time.  Infectious disease has been consulted.  MRI of the thoracic and lumbar spine with and without ordered.  Neurosurgery consulted.  I spoke with Dr. Hoffman with [facility] who agrees to accept the patient under his service.  I discussed the findings and plan with the patient who endorses understanding is in agreement.    ROS:  General: + Subjective fevers  Respiratory: no cough, dyspnea  Cardiovascular: no chest pain, palpitations  Abdomen: No abdominal pain, nausea, vomiting, or diarrhea  Neurologic: No focal weakness    Objective   Physical Exam:  I have reviewed the vital signs.  Temp:  [98.4 °F (36.9 °C)-99.8 °F (37.7 °C)] 98.5 °F (36.9 °C)  Heart Rate:  [] 101  Resp:  [16-20] 18  BP: (100-150)/()  128/91  General Appearance:  37-year-old male, well-nourished, no acute distress on room air  Head:    Normocephalic, atraumatic  Eyes:    Sclerae anicteric  Neck:   Supple, no mass  Lungs: Clear to auscultation bilaterally, respirations unlabored  Heart: Regular rate and rhythm, S1 and S2 normal, no murmur, rub or gallop  Abdomen:  Soft, left lower quadrant tenderness to palpation without guarding, bowel sounds active, nondistended  MSK: Patient has some paraspinal tenderness of the lumbar spine, pain is reproducible with movement, left flank tenderness to palpation  Extremities: No clubbing, cyanosis, or edema to lower extremities  Pulses:  2+ and symmetric in distal lower extremities  Skin: No rashes   Neurologic: Oriented x3, Normal strength to extremities    Results Review:    I have reviewed the labs, radiology results and diagnostic studies.    Results from last 7 days   Lab Units 02/09/23  0840   WBC 10*3/mm3 16.21*   HEMOGLOBIN g/dL 16.0   HEMATOCRIT % 47.5   PLATELETS 10*3/mm3 286     Results from last 7 days   Lab Units 02/09/23  0840 02/08/23  1758   SODIUM mmol/L 135* 135*   POTASSIUM mmol/L 4.1 4.2   CHLORIDE mmol/L 100 97*   CO2 mmol/L 23.6 25.7   BUN mg/dL 17 21*   CREATININE mg/dL 0.74* 0.92   CALCIUM mg/dL 9.3 9.5   BILIRUBIN mg/dL  --  0.4   ALK PHOS U/L  --  140*   ALT (SGPT) U/L  --  23   AST (SGOT) U/L  --  13   GLUCOSE mg/dL 154* 174*     Imaging Results (Last 24 Hours)     Procedure Component Value Units Date/Time    CT Angiogram Chest [[id number]] Collected: 02/09/23 1144     Updated: 02/09/23 1144    Narrative:      CT ANGIOGRAM CHEST WITH CONTRAST, PULMONARY EMBOLISM PROTOCOL     HISTORY: 37-year-old male with history of DVT. Leukocytosis. Rule out  pulmonary embolism.     TECHNIQUE: Spiral CT images were obtained from the lung apices to the  diaphragmatic domes following administration of intravenous contrast in  the angiographic phase. Coronal, sagittal and 3-D volume rendered  reformats  were then obtained.     COMPARISON: 07/07/2019     FINDINGS: The exam is limited secondary to contrast bolus timing. No  convincing filling defects are identified to suggest pulmonary artery  thromboembolism. Evaluation of the segmental vessels is somewhat  limited. The aortic root is dilated. There is also dilatation of the  ascending thoracic aorta that measures up to 4.3 cm. The descending  thoracic aorta demonstrates normal caliber. No pleural or pericardial  effusion. Small mediastinal lymph nodes are present without pathological  enlargement. The central airways are patent. No suspicious pulmonary  nodules are seen. Mosaic perfusion is demonstrated within the lung  fields. Bilateral gynecomastia is present. No pathological axillary  lymphadenopathy.       Impression:      1. Limited exam due to contrast bolus timing. No convincing evidence of  pulmonary artery thromboembolism.  2. Dilated aortic root and ascending thoracic aorta without change from  2019.     CT ABDOMEN AND PELVIS WITH CONTRAST     HISTORY: Left flank pain and leukocytosis.     TECHNIQUE: Axial CT images of the abdomen and pelvis were obtained  following administration of intravenous contrast. The patient was not  given oral contrast Coronal and sagittal reformats were obtained.     COMPARISON: 02/08/2023     FINDINGS: The small and large bowel loops demonstrate normal caliber.  The appendix is normal. No appreciable bowel wall thickening.     The liver, gallbladder, spleen and pancreas are normal. Bilateral  adrenal glands are normal. No renal calculi or hydronephrosis. Small  retroperitoneal lymph nodes are present without pathological  enlargement. The urinary bladder is moderately distended and normal.  Multiple subcentimeter retroperitoneal lymph nodes are present and  favored to be reactive. Majority of these are located within the left  para-aortic location at the level of the kidneys. No ascites or focal  fluid collection.      IMPRESSION: No acute abnormality within the abdomen and pelvis.  Subcentimeter left para-aortic lymph nodes are favored to be reactive.     Radiation dose reduction techniques were utilized, including automated  exposure control and exposure modulation based on body size.          CT Abdomen Pelvis With Contrast [[id number]] Collected: 02/09/23 1144     Updated: 02/09/23 1144    Narrative:      CT ANGIOGRAM CHEST WITH CONTRAST, PULMONARY EMBOLISM PROTOCOL     HISTORY: 37-year-old male with history of DVT. Leukocytosis. Rule out  pulmonary embolism.     TECHNIQUE: Spiral CT images were obtained from the lung apices to the  diaphragmatic domes following administration of intravenous contrast in  the angiographic phase. Coronal, sagittal and 3-D volume rendered  reformats were then obtained.     COMPARISON: 07/07/2019     FINDINGS: The exam is limited secondary to contrast bolus timing. No  convincing filling defects are identified to suggest pulmonary artery  thromboembolism. Evaluation of the segmental vessels is somewhat  limited. The aortic root is dilated. There is also dilatation of the  ascending thoracic aorta that measures up to 4.3 cm. The descending  thoracic aorta demonstrates normal caliber. No pleural or pericardial  effusion. Small mediastinal lymph nodes are present without pathological  enlargement. The central airways are patent. No suspicious pulmonary  nodules are seen. Mosaic perfusion is demonstrated within the lung  fields. Bilateral gynecomastia is present. No pathological axillary  lymphadenopathy.       Impression:      1. Limited exam due to contrast bolus timing. No convincing evidence of  pulmonary artery thromboembolism.  2. Dilated aortic root and ascending thoracic aorta without change from  2019.     CT ABDOMEN AND PELVIS WITH CONTRAST     HISTORY: Left flank pain and leukocytosis.     TECHNIQUE: Axial CT images of the abdomen and pelvis were obtained  following administration of  intravenous contrast. The patient was not  given oral contrast Coronal and sagittal reformats were obtained.     COMPARISON: 02/08/2023     FINDINGS: The small and large bowel loops demonstrate normal caliber.  The appendix is normal. No appreciable bowel wall thickening.     The liver, gallbladder, spleen and pancreas are normal. Bilateral  adrenal glands are normal. No renal calculi or hydronephrosis. Small  retroperitoneal lymph nodes are present without pathological  enlargement. The urinary bladder is moderately distended and normal.  Multiple subcentimeter retroperitoneal lymph nodes are present and  favored to be reactive. Majority of these are located within the left  para-aortic location at the level of the kidneys. No ascites or focal  fluid collection.     IMPRESSION: No acute abnormality within the abdomen and pelvis.  Subcentimeter left para-aortic lymph nodes are favored to be reactive.     Radiation dose reduction techniques were utilized, including automated  exposure control and exposure modulation based on body size.          CT Lumbar Spine Without Contrast [[id number]] Collected: 02/08/23 2320     Updated: 02/08/23 2342    Narrative:      CT OF THE THORACIC AND LUMBAR SPINE     HISTORY: Mid back pain     COMPARISON: None available.     TECHNIQUE: Axial CT imaging was obtained through the thoracic and lumbar  spine. Coronal and sagittal reformatted images were obtained.     FINDINGS:  THORACIC SPINE: No acute fracture or subluxation of the thoracic spine  is seen. No aggressive osseous abnormalities are identified. There is  some mild intervertebral disc space narrowing noted within the  midthoracic spine. There are also appears to be some mild DISH-like  change within the midthoracic spine. No significant canal stenosis or  neural foraminal narrowing is seen at any level. No acute abnormalities  are identified within the thorax.     Lumbar Spine: No acute fracture or subluxation of the lumbar  spine is  seen. Lumbar vertebral body alignment appears within normal limits.  Intervertebral disc spaces are relatively well-maintained. There is some  mild asymmetric degenerative changes involving the right SI joint.     T12-L1: There is no significant canal stenosis or neural foraminal  narrowing.  L1-L2: There is no significant canal stenosis or neural foraminal  narrowing.  L2-L3: There is no significant canal stenosis or neural foraminal  narrowing.  L3-L4: There is some mild disc bulge, without significant canal  stenosis. There may be some minimal bilateral neural foraminal  narrowing.  L4-L5: There is diffuse disc bulge. There is moderate canal narrowing,  secondary to both disc disease and hypertrophy of the ligamentum flavum.  Patient also appears to have bilateral neural foraminal narrowing.  L5-S1: There is disc bulge, but there is no significant canal stenosis.  The patient does appear to have neural foraminal narrowing on the right.  Please see the earlier report for findings within the abdomen and  pelvis.       Impression:      As above. Degenerative changes, most significant at L4-L5 and L5-S1.     Radiation dose reduction techniques were utilized, including automated  exposure control and exposure modulation based on body size.     This report was finalized on 2/8/2023 11:39 PM by Dr. Allegra Corrigan M.D.       CT Thoracic Spine Without Contrast [[id number]] Collected: 02/08/23 2320     Updated: 02/08/23 2342    Narrative:      CT OF THE THORACIC AND LUMBAR SPINE     HISTORY: Mid back pain     COMPARISON: None available.     TECHNIQUE: Axial CT imaging was obtained through the thoracic and lumbar  spine. Coronal and sagittal reformatted images were obtained.     FINDINGS:  THORACIC SPINE: No acute fracture or subluxation of the thoracic spine  is seen. No aggressive osseous abnormalities are identified. There is  some mild intervertebral disc space narrowing noted within the  midthoracic spine.  There are also appears to be some mild DISH-like  change within the midthoracic spine. No significant canal stenosis or  neural foraminal narrowing is seen at any level. No acute abnormalities  are identified within the thorax.     Lumbar Spine: No acute fracture or subluxation of the lumbar spine is  seen. Lumbar vertebral body alignment appears within normal limits.  Intervertebral disc spaces are relatively well-maintained. There is some  mild asymmetric degenerative changes involving the right SI joint.     T12-L1: There is no significant canal stenosis or neural foraminal  narrowing.  L1-L2: There is no significant canal stenosis or neural foraminal  narrowing.  L2-L3: There is no significant canal stenosis or neural foraminal  narrowing.  L3-L4: There is some mild disc bulge, without significant canal  stenosis. There may be some minimal bilateral neural foraminal  narrowing.  L4-L5: There is diffuse disc bulge. There is moderate canal narrowing,  secondary to both disc disease and hypertrophy of the ligamentum flavum.  Patient also appears to have bilateral neural foraminal narrowing.  L5-S1: There is disc bulge, but there is no significant canal stenosis.  The patient does appear to have neural foraminal narrowing on the right.  Please see the earlier report for findings within the abdomen and  pelvis.       Impression:      As above. Degenerative changes, most significant at L4-L5 and L5-S1.     Radiation dose reduction techniques were utilized, including automated  exposure control and exposure modulation based on body size.     This report was finalized on 2/8/2023 11:39 PM by Dr. Allegra Corrigan M.D.       CT Abdomen Pelvis Without Contrast [[id number]] Collected: 02/08/23 1945     Updated: 02/08/23 1953    Narrative:      CT ABDOMEN PELVIS WO CONTRAST-     INDICATIONS: Left flank pain     TECHNIQUE: Radiation dose reduction techniques were utilized, including  automated exposure control and exposure  modulation based on body size.  Unenhanced ABDOMEN AND PELVIS CT     COMPARISON: 11/30/2019     FINDINGS:      Unremarkable unenhanced appearance of the liver, gallbladder, spleen,  adrenal glands, pancreas, kidneys, bladder.     No bowel obstruction or abnormal bowel thickening is identified. The  appendix does not appear inflamed.     No free intraperitoneal gas or free fluid. Small umbilical hernia of fat  is present.     Scattered small mesenteric and para-aortic lymph nodes are seen that are  not significant by size criteria.     Abdominal aorta is not aneurysmal.     The lung bases are clear.     Degenerative changes are seen in the spine. No acute fracture is  identified.             Impression:            1. No urolithiasis or hydronephrosis.  2. No acute inflammatory process of bowel is identified, follow up as  indications persist.     This report was finalized on 2/8/2023 7:50 PM by Dr. Estevan Man M.D.             I have reviewed the medications.  ---------------------------------------------------------------------------------------------  Assessment & Plan   Assessment/Problem List    Abdominal pain      Plan:    Left flank pain/back pain/chest wall pain  Leukocytosis  -CT thoracic spine-no acute fracture or subluxation of the thoracic spine is seen  -CT lumbar spine-no acute fracture or subluxation of the lumbar spine is seen.          Degenerative changes, most significantly at L4-L5 and L5-S1.  -Robaxin 4 times daily  -Analgesics as needed  -WBC16.2, CRP 10.32, ESR 66  -Lactic and Pro-Antolin normal  -Blood cultures x2-pending  -Urinalysis without evidence of infection  -CTA of the chest negative for PE, no acute findings  -CTA of the abdomen and pelvis with contrast shows no acute abnormality  - exam normal, low suspicion for Donna's gangrene  -Infectious disease consulted  -Consider MRI of the thoracic and lumbar spine with and without to assess for discitis     Diabetes  -A1c  7.9  -Continue home basal insulin and Jardiance  -Sliding scale insulin and Accu-Cheks with meals      Disposition: Admit to medicine, Dr. Stroud with [facility]    This note will serve as a transfer summary.      60 minutes have been spent by [facility] providers in the care of this patient while under observation status.      I wore an face mask, eye protection, and gloves during this patient encounter. Patient also wearing a surgical mask. Hand hygeine performed before and after seeing the patient.      Suzan Ramirez PA-C 02/09/23 13:46 EST

## 2023-02-09 NOTE — H&P
[facility]   HISTORY AND PHYSICAL    Patient Name: Osvaldo Welsh  : 1985  MRN: [id number]  Primary Care Physician:  Chuy Hays APRN  Date of admission: 2023    Subjective   Subjective     Chief Complaint: Back pain, left flank pain, left chest wall/rib pain    History of Present Illness  Osvaldo Welsh is a 37-year-old male, with a past medical history of hypertension, diabetes, and hyperlipidemia, presented to the emergency department with a complaint of left-sided flank pain that radiates to his left lower abdomen.  He states that he was pulling a motor out of the vehicle last night and when he was done he noticed that his back was bothering him as it has done historically.  He states that he went home went to bed and slept all night but when he got up this morning he noticed the pain in his left flank that got worse so he came to the emergency department.  He denies any nausea, vomiting, urinary retention, urinary urgency, bowel or bladder dysfunction.  Review of Systems   Constitutional: Negative.    HENT: Negative.    Eyes: Negative.    Respiratory: Negative.  Negative for shortness of breath.    Cardiovascular: Negative.  Negative for chest pain and palpitations.   Gastrointestinal: Negative.  Negative for abdominal pain, constipation, diarrhea, nausea and vomiting.   Endocrine: Negative.    Genitourinary: Positive for flank pain. Negative for difficulty urinating, testicular pain and urgency.   Musculoskeletal: Positive for back pain. Negative for neck pain.   Skin: Negative.    Allergic/Immunologic: Negative.    Neurological: Negative.    Hematological: Negative.    Psychiatric/Behavioral: Negative.         Personal History     Past Medical History:   Diagnosis Date   • Asthma    • Diabetes (HCC)    • Hyperlipidemia    • Hypertension    • Optic nerve hemorrhage, left        Past Surgical History:   Procedure Laterality Date   • TONSILLECTOMY AND ADENOIDECTOMY         Family History:  family history includes Diabetes in his father, paternal grandfather, and paternal grandmother; Hypertension in his maternal grandfather and maternal grandmother. Otherwise pertinent FHx was reviewed and not pertinent to current issue.    Social History:  reports that he has never smoked. He has never used smokeless tobacco. He reports current alcohol use. He reports that he does not use drugs.    Home Medications:  Accu-Chek Maris Plus, BASAGLAR KWIKPEN, Insulin Pen Needle, Medihoney Wound/Burn Dressing, TRUEplus Lancets 28G, aspirin, atorvastatin, dapagliflozin Propanediol, glucose blood, lisinopril-hydrochlorothiazide, tadalafil, and vitamin D    Allergies:  Allergies   Allergen Reactions   • Metformin Other (See Comments)     Fatigue and muscle aches       Objective    Objective     Vitals:   Temp:  [98.4 °F (36.9 °C)-98.8 °F (37.1 °C)] 98.4 °F (36.9 °C)  Heart Rate:  [] 103  Resp:  [18-20] 18  BP: (100-150)/() 100/67    Physical Exam  Vitals and nursing note reviewed.   Constitutional:       General: He is not in acute distress.     Appearance: Normal appearance.   Cardiovascular:      Rate and Rhythm: Normal rate and regular rhythm.      Pulses: Normal pulses.      Heart sounds: Normal heart sounds.   Pulmonary:      Effort: Pulmonary effort is normal.      Breath sounds: Normal breath sounds.   Abdominal:      General: Bowel sounds are normal.      Palpations: Abdomen is soft.   Musculoskeletal:         General: Tenderness present. No swelling or signs of injury.      Thoracic back: Tenderness present. No spasms. Decreased range of motion.      Lumbar back: Tenderness present. No spasms. Negative right straight leg raise test and negative left straight leg raise test.      Right lower leg: No edema.      Left lower leg: No edema.      Comments: Tenderness radiates from back to flank   Skin:     General: Skin is warm and dry.      Capillary Refill: Capillary refill takes less than 2 seconds.    Neurological:      General: No focal deficit present.      Mental Status: He is alert and oriented to person, place, and time.   Psychiatric:         Mood and Affect: Mood normal.         Behavior: Behavior normal.         Thought Content: Thought content normal.         Judgment: Judgment normal.         Result Review    Result Review:  I have personally reviewed the results from the time of this admission to 2/8/2023 22:08 EST and agree with these findings:  [x]  Laboratory list / accordion  []  Microbiology  [x]  Radiology  [x]  EKG/Telemetry   []  Cardiology/Vascular   []  Pathology  [x]  Old records  []  Other:        Assessment & Plan   Assessment / Plan     Brief Patient Summary:  Osvaldo Welsh is a 37 y.o. male who was admitted to the observation unit for further evaluation and treatment of his left flank pain, back pain, left chest wall pain.    Active Hospital Problems:  Active Hospital Problems    Diagnosis    • **Abdominal pain      Plan:   Left flank pain/back pain/chest wall pain  -CT thoracic spine-no acute fracture or subluxation of the thoracic spine is seen  -CT lumbar spine-no acute fracture or subluxation of the lumbar spine is seen.          Degenerative changes, most significantly at L4-L5 and L5-S1.  -Robaxin 4 times daily  -As needed pain medication  -Lactate-pending  -Procalcitonin-pending  -Blood cultures x2-pending  -Repeat labs in a.m.      Diabetes  -Accu-Cheks AC  -Adult subcutaneous insulin management-moderate dose  -Hemoglobin [id number]      DVT prophylaxis:  Mechanical DVT prophylaxis orders are present.    CODE STATUS:    Code Status (Patient has no pulse and is not breathing): CPR (Attempt to Resuscitate)  Medical Interventions (Patient has pulse or is breathing): Full Support    Admission Status:  I believe this patient meets observation status.    81 minutes has been spent by [facility] providers in the care of this patient while under  observation status.    Alfreda Amado, APRN

## 2023-02-09 NOTE — PROGRESS NOTES
MD ATTESTATION NOTE    The CHELSEA and I have discussed this patient's history, physical exam, and treatment plan.  I have reviewed the documentation and personally had a face to face interaction with the patient. I affirm the documentation and agree with the treatment and plan.  The attached note describes my personal findings.      I provided a substantive portion of the care of the patient.  I personally performed the physical exam in its entirety, and below are my findings.  For this patient encounter, the patient wore surgical mask, I wore full protective PPE including N95 and eye protection.      Brief HPI: 37-year-old gentleman who presented the emergency department with flank and abdominal pain.  This seems to have begun after he was doing some work in bent over an awkward position.  He has a history of back issues, and his work-up in the ED was fairly unremarkable.  Currently he says that the pain is mostly in his back, with perhaps some radicular symptoms coming around the chest and upper abdomen but no true abdominal pain nausea or vomiting.    PHYSICAL EXAM  ED Triage Vitals   Temp Heart Rate Resp BP SpO2   02/08/23 1742 02/08/23 1742 02/08/23 1742 02/08/23 1743 02/08/23 1742   98.8 °F (37.1 °C) 119 20 (!) 150/116 97 %      Temp src Heart Rate Source Patient Position BP Location FiO2 (%)   02/08/23 2100 02/08/23 2100 02/08/23 2100 02/08/23 2100 --   Oral Monitor Lying Right arm          GENERAL: no acute distress  HENT: nares patent  EYES: no scleral icterus  CV: regular rhythm, normal rate, distal pulses symmetric and intact  RESPIRATORY: normal effort  ABDOMEN: soft, nondistended and nontender  MUSCULOSKELETAL: no deformity.  Diffuse thoracolumbar tenderness with spasm, but no step-off or deformity and neurovascular intact  NEURO: alert, moves all extremities, follows commands  PSYCH:  calm, cooperative  SKIN: warm, dry    Vital signs and nursing notes reviewed.    MDM DISCUSSION        Plan: Currently,  the patient is admitted the observation unit for further evaluation of the pain.  At this point his symptoms as he describes them seem more related to his back then to his abdomen.  We will continue to evaluate including CT scan of the thoracic and lumbar spine as well as optimize blood pressure management and pain control.  We will reevaluate his abdomen in the a.m. to see if surgery consult is indicated

## 2023-02-09 NOTE — CONSULTS
Internal Medicine Consult  INTERNAL MEDICINE   [facility]       Patient Identification:  Name: Osvaldo Welsh  Age: 37 y.o.  Sex: male  :  1985  MRN: [id number]                   Primary Care Physician: Chuy Hays APRN                               Requesting physician: Suzan Ramirez PA-C  Date of consultation: 2023    Reason for consultation: Persistent back pain with elevated inflammatory markers of unclear etiology requiring further inpatient work-up.    History of Present Illness:   Patient is a 37-year-old male with past medical history remarkable for asthma and diabetes and hypertension and has had issues and complications because of diabetes involving diabetic ulcers of the left foot and Charcot foot involving the right foot in the past and at present does not have any issues ongoing with diabetic foot wounds involving his feet and has have history of chronic back pain for the last 20 years.  He describes his back pain as intermittent discomfort that comes and goes away and he is accustomed to it and this background on Tuesday going for a ball game he noticed some discomfort in his back which is making him difficult to walk and be comfortable.  It felt the recurrence of pain that he had before so he did not pay much attention to it until Wednesday morning when he woke up with severe pain and unable to move.  He apparently pulled out some heavy stuff from his trunk last night in the background of ongoing discomfort.  The pain was involving his left lower back going into the flank area and turning around into his groin.  Patient did not have any associated frequency and burning in urination blood in the urine nausea or vomiting.  Patient was seen in the emergency room and had CT scan of the lumbar and thoracic spine and CT scan of the abdomen and pelvis with negative work-up but his pain persisted and he was noted to have abnormally elevated inflammatory markers and  leukocytosis.  Patient denies any pain and discomfort upon having bowel movement of pain and discomfort in the perirectal area.  Because of the persistent discomfort and negative work-up MRIs of the lower thoracic and lumbar spine have been ordered and infectious disease service is consulted and neurosurgery service is consulted.  Since all of this would require further stay in the hospital internal medicine service/hospitalist service are asked to take over his care going forward.  Patient at present denies any fever and chills.  He did mention that he felt hot when he had episode of severe pain but nothing was going on prior and has been doing okay fever wise since.      Past Medical History:  Past Medical History:   Diagnosis Date   • Asthma    • Diabetes (HCC)    • Hyperlipidemia    • Hypertension    • Optic nerve hemorrhage, left      Past Surgical History:  Past Surgical History:   Procedure Laterality Date   • TONSILLECTOMY AND ADENOIDECTOMY        Home Meds:  Medications Prior to Admission   Medication Sig Dispense Refill Last Dose   • aspirin 81 MG EC tablet TAKE (1) TABLET DAILY 30 tablet 11 2/8/2023 at 1630   • atorvastatin (LIPITOR) 20 MG tablet Take 1 tablet by mouth Daily. 90 tablet 1 2/8/2023 at 1630   • Dapagliflozin Propanediol (Farxiga) 10 MG tablet Take 10 mg by mouth Daily. 90 tablet 1 2/8/2023 at 1630   • lisinopril-hydrochlorothiazide (PRINZIDE,ZESTORETIC) 20-25 MG per tablet TAKE (1) TABLET DAILY 90 tablet 0 2/8/2023 at 1630   • Blood Glucose Monitoring Suppl (ACCU-CHEK MANAV PLUS) w/Device kit Dx e11.9 use to check BS BID ok to substitute formulary preferred 1 kit 0    • glucose blood (ACCU-CHEK MANAV PLUS) test strip Dx e11.9 use to check BS BID ok to substitute formulary preferred 60 each 11    • glucose blood test strip accucheck strips daily e11.9 100 each 12    • Insulin Glargine (BASAGLAR KWIKPEN) 100 UNIT/ML injection pen Inject 90 Units under the skin into the appropriate area as  directed Daily for 90 days. INJECT 90 UNITS UNDER THE SKIN INTO THE APPROPRIATE AREA AS DIRECTED 90 mL 1    • Insulin Pen Needle (B-D ULTRAFINE III SHORT PEN) 31G X 8 MM misc USE EVERY MORNING FOR INSULIN INJECTION 100 each 2    • Insulin Pen Needle (B-D ULTRAFINE III SHORT PEN) 31G X 8 MM misc Use to inject insulin daily 100 each 1    • NON FORMULARY Inject 4.25 mcg as directed 1 (One) Time Per Week. TRULICITY Last dose Monday, 2/6/23      • tadalafil (CIALIS) 20 MG tablet Take 1 tablet by mouth As Needed for Erectile Dysfunction. 6 tablet 2    • True Metrix Blood Glucose Test test strip Dx code E11.65 testing bs 1 x day 100 each 1    • TRUEplus Lancets 28G misc CHECK BLOOD GLUCOSE DAILY 100 each 1    • vitamin D (ERGOCALCIFEROL) 1.25 MG (95503 UT) capsule capsule Take 1 capsule by mouth 2 (Two) Times a Week. 24 capsule 1    • Wound Dressings (MEDIHONEY WOUND/BURN DRESSING) paste APPLY AS DIRECTED 44 mL 3      Current Meds:     Current Facility-Administered Medications:   •  acetaminophen (TYLENOL) tablet 650 mg, 650 mg, Oral, Q4H PRN, Alfreda Amado, APRN  •  aspirin EC tablet 81 mg, 81 mg, Oral, Daily, Alfreda Amado APRN, 81 mg at 02/09/23 0906  •  atorvastatin (LIPITOR) tablet 20 mg, 20 mg, Oral, Daily, Alfreda Amado, APRN, 20 mg at 02/09/23 0906  •  dextrose (D50W) (25 g/50 mL) IV injection 25 g, 25 g, Intravenous, Q15 Min PRN, Alfreda Amado, APRN  •  dextrose (GLUTOSE) oral gel 15 g, 15 g, Oral, Q15 Min PRN, Alfreda Amado, APRN  •  diazePAM (VALIUM) tablet 2 mg, 2 mg, Oral, Once, Arnol Bennett MD  •  empagliflozin (JARDIANCE) tablet 25 mg, 25 mg, Oral, Daily, Alfreda Amado, APRN  •  glucagon (GLUCAGEN) injection 1 mg, 1 mg, Intramuscular, Q15 Min PRN, Alfreda Amado, APRSADIE  •  lisinopril (PRINIVIL,ZESTRIL) tablet 20 mg, 20 mg, Oral, Q24H, 20 mg at 02/09/23 0906 **AND** hydroCHLOROthiazide (HYDRODIURIL) tablet 25 mg, 25 mg, Oral, Q24H, Alfreda Amado, APRN, 25 mg at 02/09/23 0906  •   HYDROcodone-acetaminophen (NORCO) 5-325 MG per tablet 1 tablet, 1 tablet, Oral, Q6H PRN, Rudy Berger MD, 1 tablet at 02/09/23 1449  •  insulin glargine (LANTUS, SEMGLEE) injection 90 Units, 90 Units, Subcutaneous, Daily, Alfreda Amado APRN, 90 Units at 02/09/23 0905  •  insulin lispro (ADMELOG) injection 0-14 Units, 0-14 Units, Subcutaneous, TID AC, Alfreda Amado APRN  •  methocarbamol (ROBAXIN) tablet 750 mg, 750 mg, Oral, 4x Daily, Alfreda Amado S APRN, 750 mg at 02/09/23 1248  •  ondansetron (ZOFRAN) tablet 4 mg, 4 mg, Oral, Q6H PRN **OR** ondansetron (ZOFRAN) injection 4 mg, 4 mg, Intravenous, Q6H PRN, Alfreda Amado S, APRN  •  sodium chloride 0.9 % flush 10 mL, 10 mL, Intravenous, PRN, Dillon Castaneda MD  •  sodium chloride 0.9 % flush 10 mL, 10 mL, Intravenous, Q12H, Alfreda Amado S APRN, 10 mL at 02/09/23 0906  •  sodium chloride 0.9 % flush 10 mL, 10 mL, Intravenous, PRN, Alfreda Amado S, APRN  •  sodium chloride 0.9 % infusion 40 mL, 40 mL, Intravenous, PRN, Katt Amadohy S, APRN  Allergies:  Allergies   Allergen Reactions   • Metformin Other (See Comments)     Fatigue and muscle aches     Social History:   Social History     Tobacco Use   • Smoking status: Never   • Smokeless tobacco: Never   Substance Use Topics   • Alcohol use: Yes     Comment: social      Family History:  Family History   Problem Relation Age of Onset   • Diabetes Father    • Hypertension Maternal Grandmother    • Hypertension Maternal Grandfather    • Diabetes Paternal Grandmother    • Diabetes Paternal Grandfather           Review of Systems  See history of present illness and past medical history. Remainder of ROS is negative.  General: + Subjective fevers  Respiratory: no cough, dyspnea  Cardiovascular: no chest pain, palpitations  Abdomen: No abdominal pain, nausea, vomiting, or diarrhea  Neurologic: No focal weakness    Vitals:   /87 (BP Location: Left arm, Patient Position: Sitting)   Pulse 99   Temp 98.8 °F (37.1  "°C) (Oral)   Resp 18   Ht 188 cm (74\")   Wt (!) 141 kg (310 lb)   SpO2 98%   BMI 39.80 kg/m²   I/O: No intake or output data in the 24 hours ending 02/09/23 1645  Exam:  Patient is examined using the personal protective equipment as per guidelines from infection control for this particular patient as enacted.  Hand washing was performed before and after patient interaction.  General Appearance:    Alert, cooperative, no distress, appears stated age, who is sitting at the edge of the bed and does not appear to be toxic or septic.   Head:    Normocephalic, without obvious abnormality, atraumatic   Eyes:    PERRL, conjunctiva/corneas clear, EOM's intact, both eyes   Ears:    Normal external ear canals, both ears   Nose:   Nares normal, septum midline, mucosa normal, no drainage    or sinus tenderness   Throat:   Lips, tongue, gums normal; oral mucosa pink and moist   Neck:   Supple, symmetrical, trachea midline, no adenopathy;     thyroid:  no enlargement/tenderness/nodules; no carotid    bruit or JVD   Back:    No specific CVA tenderness noted but left paraspinal muscle spasm and loss of lumbar lordosis noted.   Lungs:     Clear to auscultation bilaterally, respirations unlabored   Chest Wall:    No tenderness or deformity    Heart:    Regular rate and rhythm, S1 and S2 normal, no murmur, rub   or gallop   Abdomen:     Soft, non-tender, bowel sounds active all four quadrants,     no masses, no hepatomegaly, no splenomegaly   Extremities:  No obvious diabetic foot wound involving the feet noted evolving Charcot deformity of the right foot noted   Pulses:   Pulses palpable in all extremities; symmetric all extremities   Skin:   Skin color normal, Skin is warm and dry,  no rashes or palpable lesions   Neurologic:  Alert and oriented with limited movement of the left lower extremity due to pain.       Data Review:      I reviewed the patient's new clinical results.  Results from last 7 days   Lab Units 02/09/23  0840 " 02/08/23  1758   WBC 10*3/mm3 16.21* 17.94*   HEMOGLOBIN g/dL 16.0 16.6   PLATELETS 10*3/mm3 286 283     Results from last 7 days   Lab Units 02/09/23  0840 02/08/23  1758   SODIUM mmol/L 135* 135*   POTASSIUM mmol/L 4.1 4.2   CHLORIDE mmol/L 100 97*   CO2 mmol/L 23.6 25.7   BUN mg/dL 17 21*   CREATININE mg/dL 0.74* 0.92   CALCIUM mg/dL 9.3 9.5   GLUCOSE mg/dL 154* 174*     CT Abdomen Pelvis Without Contrast    Result Date: 2/8/2023    1. No urolithiasis or hydronephrosis. 2. No acute inflammatory process of bowel is identified, follow up as indications persist.  This report was finalized on 2/8/2023 7:50 PM by Dr. Estevan Man M.D.      CT Thoracic Spine Without Contrast    Result Date: 2/8/2023  As above. Degenerative changes, most significant at L4-L5 and L5-S1.  Radiation dose reduction techniques were utilized, including automated exposure control and exposure modulation based on body size.  This report was finalized on 2/8/2023 11:39 PM by Dr. Allegra Corrigan M.D.      CT Lumbar Spine Without Contrast    Result Date: 2/8/2023  As above. Degenerative changes, most significant at L4-L5 and L5-S1.  Radiation dose reduction techniques were utilized, including automated exposure control and exposure modulation based on body size.  This report was finalized on 2/8/2023 11:39 PM by Dr. Allegra Corrigan M.D.      CT Abdomen Pelvis With Contrast    Result Date: 2/9/2023  1. Limited exam due to contrast bolus timing. No convincing evidence of pulmonary artery thromboembolism. 2. Dilated aortic root and ascending thoracic aorta without change from 2019.  CT ABDOMEN AND PELVIS WITH CONTRAST  HISTORY: Left flank pain and leukocytosis.  TECHNIQUE: Axial CT images of the abdomen and pelvis were obtained following administration of intravenous contrast. The patient was not given oral contrast Coronal and sagittal reformats were obtained.  COMPARISON: 02/08/2023  FINDINGS: The small and large bowel loops demonstrate  normal caliber. The appendix is normal. No appreciable bowel wall thickening.  The liver, gallbladder, spleen and pancreas are normal. Bilateral adrenal glands are normal. No renal calculi or hydronephrosis. Small retroperitoneal lymph nodes are present without pathological enlargement. The urinary bladder is moderately distended and normal. Multiple subcentimeter retroperitoneal lymph nodes are present and favored to be reactive. Majority of these are located within the left para-aortic location at the level of the kidneys. No ascites or focal fluid collection.  IMPRESSION: No acute abnormality within the abdomen and pelvis. Subcentimeter left para-aortic lymph nodes are favored to be reactive.  Radiation dose reduction techniques were utilized, including automated exposure control and exposure modulation based on body size.       CT Angiogram Chest    Result Date: 2/9/2023  1. Limited exam due to contrast bolus timing. No convincing evidence of pulmonary artery thromboembolism. 2. Dilated aortic root and ascending thoracic aorta without change from 2019.  CT ABDOMEN AND PELVIS WITH CONTRAST  HISTORY: Left flank pain and leukocytosis.  TECHNIQUE: Axial CT images of the abdomen and pelvis were obtained following administration of intravenous contrast. The patient was not given oral contrast Coronal and sagittal reformats were obtained.  COMPARISON: 02/08/2023  FINDINGS: The small and large bowel loops demonstrate normal caliber. The appendix is normal. No appreciable bowel wall thickening.  The liver, gallbladder, spleen and pancreas are normal. Bilateral adrenal glands are normal. No renal calculi or hydronephrosis. Small retroperitoneal lymph nodes are present without pathological enlargement. The urinary bladder is moderately distended and normal. Multiple subcentimeter retroperitoneal lymph nodes are present and favored to be reactive. Majority of these are located within the left para-aortic location at the  level of the kidneys. No ascites or focal fluid collection.  IMPRESSION: No acute abnormality within the abdomen and pelvis. Subcentimeter left para-aortic lymph nodes are favored to be reactive.  Radiation dose reduction techniques were utilized, including automated exposure control and exposure modulation based on body size.         Assessment:  Active Hospital Problems    Diagnosis  POA   • **Abdominal pain [R10.9]  Yes   • Left low back pain [M54.50]  Yes   • Type 2 diabetes mellitus with hyperglycemia, with long-term current use of insulin (HCC) [E11.65, Z79.4]  Not Applicable   • Essential hypertension [I10]  Yes       Medical decision making/care plan: See admitting orders  Left flank/lower back pain with elevated inflammatory markers and leukocytosis with extensive imaging studies so far negative for any  or lumbosacral abnormalities-plan is to monitor his pain provide him with symptomatic relief while awaiting evaluation by neurosurgery and infectious disease service.  They have been consulted by the observation team earlier today.  History of recurrent low back pain with recent at risk activity including moving heavy stuff out of his vehicle-this could have further contributed to his above discomfort.  Continue with symptomatic relief.  Type 2 diabetes-continue with insulin and sliding scale program and Accu-Cheks.  Watch for hypoglycemia and check hemoglobin A1c.  Hypertension-continue antihypertensive regimen and avoid hypotensive episodes.    Grace Stroud MD   2/9/2023  16:45 EST    Parts of this note may be an electronic transcription/translation of spoken language to printed text using the Dragon dictation system.

## 2023-02-09 NOTE — ED NOTES
Nursing report ED to floor  Osvaldo Welsh  37 y.o.  male    HPI :   Chief Complaint   Patient presents with    Flank Pain    Abdominal Pain       Admitting doctor:   Rudy Berger MD    Admitting diagnosis:   The primary encounter diagnosis was Left sided abdominal pain. A diagnosis of Leukocytosis, unspecified type was also pertinent to this visit.    Code status:   Current Code Status       Date Active Code Status Order ID Comments User Context       2/8/2023 2111 CPR (Attempt to Resuscitate) [id number]  Alfreda Amado APRN ED        Question Answer    Code Status (Patient has no pulse and is not breathing) CPR (Attempt to Resuscitate)    Medical Interventions (Patient has pulse or is breathing) Full Support                    Allergies:   Metformin    Isolation:   No active isolations    Intake and Output  No intake or output data in the 24 hours ending 02/08/23 2113    Weight:       02/08/23 1742   Weight: (!) 141 kg (310 lb)       Most recent vitals:   Vitals:    02/08/23 1743 02/08/23 1858 02/08/23 1903 02/08/23 2001   BP: (!) 150/116 127/93 120/72 109/71   Pulse:  104 102 96   Resp:       Temp:       SpO2:  97% 96% 97%   Weight:       Height:           Active LDAs/IV Access:   Lines, Drains & Airways       Active LDAs       Name Placement date Placement time Site Days    Peripheral IV 02/08/23 2007 Right Antecubital 02/08/23 2007  Antecubital  less than 1                    Labs (abnormal labs have a star):   Labs Reviewed   COMPREHENSIVE METABOLIC PANEL - Abnormal; Notable for the following components:       Result Value    Glucose 174 (*)     BUN 21 (*)     Sodium 135 (*)     Chloride 97 (*)     Alkaline Phosphatase 140 (*)     All other components within normal limits    Narrative:     GFR Normal >60  Chronic Kidney Disease <60  Kidney Failure <15     URINALYSIS W/ MICROSCOPIC IF INDICATED (NO CULTURE) - Abnormal; Notable for the following components:    Glucose, UA >=1000 mg/dL (3+) (*)     Ketones,  "UA 15 mg/dL (1+) (*)     Protein, UA 30 mg/dL (1+) (*)     All other components within normal limits   CBC WITH AUTO DIFFERENTIAL - Abnormal; Notable for the following components:    WBC 17.94 (*)     RDW 12.1 (*)     Neutrophil % 80.9 (*)     Lymphocyte % 11.6 (*)     Eosinophil % 0.2 (*)     Neutrophils, Absolute 14.51 (*)     Monocytes, Absolute 1.18 (*)     Immature Grans, Absolute 0.08 (*)     All other components within normal limits   LIPASE - Normal   D-DIMER, QUANTITATIVE - Normal    Narrative:     According to the assay 's published package insert, a normal (<0.50 MCGFEU/mL) D-dimer result in conjunction with a non-high clinical probability assessment, excludes deep vein thrombosis (DVT) and pulmonary embolism (PE) with high sensitivity.    D-dimer values increase with age and this can make VTE exclusion of an older population difficult. To address this, the [employer], based on best available evidence and recent guidelines, recommends that clinicians use age-adjusted D-dimer thresholds in patients greater than 50 years of age with: a) a low probability of PE who do not meet all Pulmonary Embolism Rule Out Criteria, or b) in those with intermediate probability of PE.   The formula for an age-adjusted D-dimer cut-off is \"age/100\".  For example, a 60 year old patient would have an age-adjusted cut-off of 0.60 MCGFEU/mL and an 80 year old 0.80 MCGFEU/mL.   RAINBOW DRAW    Narrative:     The following orders were created for panel order [place] Draw.  Procedure                               Abnormality         Status                     ---------                               -----------         ------                     Green Top (Gel)[[id number]]                                  Final result               Lavender Top[[id number]]                                     Final result               Gold Top - SST[[id number]]                                   Final result             "   Light Blue Top[[id number]]                                   Final result                 Please view results for these tests on the individual orders.   URINALYSIS, MICROSCOPIC ONLY   TROPONIN   CBC AND DIFFERENTIAL    Narrative:     The following orders were created for panel order CBC & Differential.  Procedure                               Abnormality         Status                     ---------                               -----------         ------                     CBC Auto Differential[[id number]]        Abnormal            Final result                 Please view results for these tests on the individual orders.   GREEN TOP   LAVENDER TOP   GOLD TOP - SST   LIGHT BLUE TOP       EKG:   ECG 12 Lead Other; Left chest pain   Preliminary Result   HEART RATE= 101  bpm   RR Interval= 594  ms   PA Interval= 187  ms   P Horizontal Axis= 6  deg   P Front Axis= 55  deg   QRSD Interval= 99  ms   QT Interval= 346  ms   QRS Axis= -37  deg   T Wave Axis= 59  deg   - BORDERLINE ECG -   Sinus tachycardia   Left axis deviation   Abnormal R-wave progression, early transition   Electronically Signed By:    Date and Time of Study: 2023-02-08 20:50:38          Meds given in ED:   Medications   sodium chloride 0.9 % flush 10 mL (has no administration in time range)   sodium chloride 0.9 % flush 10 mL (has no administration in time range)   sodium chloride 0.9 % flush 10 mL (has no administration in time range)   sodium chloride 0.9 % infusion 40 mL (has no administration in time range)   acetaminophen (TYLENOL) tablet 650 mg (has no administration in time range)   ondansetron (ZOFRAN) tablet 4 mg (has no administration in time range)     Or   ondansetron (ZOFRAN) injection 4 mg (has no administration in time range)   ondansetron (ZOFRAN) injection 4 mg (4 mg Intravenous Given 2/8/23 2009)   sodium chloride 0.9 % bolus 500 mL (500 mL Intravenous New Bag 2/8/23 2007)   morphine injection 4 mg (4 mg Intravenous Given 2/8/23 2009)        Imaging results:  CT Abdomen Pelvis Without Contrast    Result Date: 2/8/2023    1. No urolithiasis or hydronephrosis. 2. No acute inflammatory process of bowel is identified, follow up as indications persist.  This report was finalized on 2/8/2023 7:50 PM by Dr. Estevan Man M.D.       Ambulatory status:   - ad halle    Social issues:   Social History     Socioeconomic History    Marital status: Single   Tobacco Use    Smoking status: Never    Smokeless tobacco: Never   Vaping Use    Vaping Use: Never used   Substance and Sexual Activity    Alcohol use: No    Drug use: No    Sexual activity: Defer       NIH Stroke Scale:         Tere Kirkpatrick RN  02/08/23 21:13 EST     Call [phone] for report

## 2023-02-09 NOTE — ED PROVIDER NOTES
EMERGENCY DEPARTMENT ENCOUNTER    Room Number:  06/06  Date of encounter:  2/8/2023  PCP: Chuy Hays APRN  Historian: Patient  Full history not obtainable due to: None    HPI:  Chief Complaint: Abdominal pain    Context: Osvaldo Welsh is a 37 y.o. male with a PMH significant for type 2 diabetes, hypertension, DVT who presents to the ED c/o a 2-day history of left flank pain that radiates towards the left lower quadrant.  He describes an aching sensation that has been waxing and waning in intensity but never completely resolved since onset.  He admits to nausea without vomiting.  No recent abdominal surgeries or antibiotic use.  Denies fever, chills, change to bowel habits or urination.      MEDICAL RECORD REVIEW:    Upon review of the medical record it appears the patient's most recent evaluation was in the office with vascular surgery for lower leg edema on November 22, 2022    PAST MEDICAL HISTORY    Active Ambulatory Problems     Diagnosis Date Noted   • Closed displaced fracture of navicular bone of left foot 08/02/2016   • Sprain 08/02/2016   • Closed nondisplaced fracture of cuboid bone of right foot 08/15/2016   • Sprain of right ankle 09/08/2016   • Arthritis of ankle 03/03/2017   • Peroneal tendonitis 03/03/2017   • Ankle impingement syndrome 03/03/2017   • Left ankle pain 03/03/2017   • Tendonitis, Achilles, left 03/27/2017   • Type 2 diabetes mellitus with hyperglycemia, with long-term current use of insulin ([facility]) 12/20/2017   • Essential hypertension 12/20/2017   • Class 2 obesity without serious comorbidity with body mass index (BMI) of 38.0 to 38.9 in adult 12/20/2017   • Diabetic retinopathy ([facility]) 03/21/2018   • Injury of peroneal tendon of right foot 01/31/2019   • Closed nondisplaced fracture of third metatarsal bone of right foot 01/31/2019   • Peroneal tendonitis, left 02/14/2019   • Plantar fasciitis 02/14/2019   • Bone marrow edema 02/14/2019   • Peroneal tendon tear, right, subsequent  encounter 07/18/2019   • Nontraumatic tear of plantar fascia 07/18/2019   • Calcific Achilles tendonitis 07/18/2019   • Acute deep vein thrombosis (DVT) of distal vein of right lower extremity ([facility]) 07/18/2019   • Ulcer of left foot, with fat layer exposed ([facility]) 07/18/2019   • Dyslipidemia 10/17/2019   • Vitamin D deficiency disease 10/17/2019   • Erectile disorder, acquired, situational, severe 04/07/2020     Resolved Ambulatory Problems     Diagnosis Date Noted   • No Resolved Ambulatory Problems     Past Medical History:   Diagnosis Date   • Asthma    • Diabetes (CMS/[facility])    • Hyperlipidemia    • Hypertension    • Optic nerve hemorrhage, left          PAST SURGICAL HISTORY  Past Surgical History:   Procedure Laterality Date   • TONSILLECTOMY AND ADENOIDECTOMY           FAMILY HISTORY  Family History   Problem Relation Age of Onset   • Diabetes Father    • Hypertension Maternal Grandmother    • Hypertension Maternal Grandfather    • Diabetes Paternal Grandmother    • Diabetes Paternal Grandfather          SOCIAL HISTORY  Social History     Socioeconomic History   • Marital status: Single   Tobacco Use   • Smoking status: Never   • Smokeless tobacco: Never   Vaping Use   • Vaping Use: Never used   Substance and Sexual Activity   • Alcohol use: No   • Drug use: No   • Sexual activity: Defer         ALLERGIES  Metformin        REVIEW OF SYSTEMS    All systems reviewed and marked as negative except as listed in HPI     PHYSICAL EXAM    I have reviewed the triage vital signs and nursing notes.    ED Triage Vitals   Temp Heart Rate Resp BP SpO2   02/08/23 1742 02/08/23 1742 02/08/23 1742 02/08/23 1743 02/08/23 1742   98.8 °F (37.1 °C) 119 20 (!) 150/116 97 %      Temp src Heart Rate Source Patient Position BP Location FiO2 (%)   -- -- -- -- --              Physical Exam  Constitutional:       General: He is not in acute distress.     Appearance: He is well-developed.   HENT:      Head: Normocephalic and atraumatic.    Eyes:      General: No scleral icterus.     Conjunctiva/sclera: Conjunctivae normal.   Neck:      Trachea: No tracheal deviation.   Cardiovascular:      Rate and Rhythm: Regular rhythm. Tachycardia present.   Pulmonary:      Effort: Pulmonary effort is normal.      Breath sounds: Normal breath sounds.   Abdominal:      Palpations: Abdomen is soft.      Tenderness: There is abdominal tenderness in the left upper quadrant and left lower quadrant. There is no guarding.   Musculoskeletal:         General: No deformity.      Cervical back: Normal range of motion.   Lymphadenopathy:      Cervical: No cervical adenopathy.   Skin:     General: Skin is warm and dry.   Neurological:      Mental Status: He is alert and oriented to person, place, and time.   Psychiatric:         Behavior: Behavior normal.         Vital signs and nursing notes reviewed.            LAB RESULTS  Recent Results (from the past 24 hour(s))   Comprehensive Metabolic Panel    Collection Time: 02/08/23  5:58 PM    Specimen: Blood   Result Value Ref Range    Glucose 174 (H) 65 - 99 mg/dL    BUN 21 (H) 6 - 20 mg/dL    Creatinine 0.92 0.76 - 1.27 mg/dL    Sodium 135 (L) 136 - 145 mmol/L    Potassium 4.2 3.5 - 5.2 mmol/L    Chloride 97 (L) 98 - 107 mmol/L    CO2 25.7 22.0 - 29.0 mmol/L    Calcium 9.5 8.6 - 10.5 mg/dL    Total Protein 8.0 6.0 - 8.5 g/dL    Albumin 4.3 3.5 - 5.2 g/dL    ALT (SGPT) 23 1 - 41 U/L    AST (SGOT) 13 1 - 40 U/L    Alkaline Phosphatase 140 (H) 39 - 117 U/L    Total Bilirubin 0.4 0.0 - 1.2 mg/dL    Globulin 3.7 gm/dL    A/G Ratio 1.2 g/dL    BUN/Creatinine Ratio 22.8 7.0 - 25.0    Anion Gap 12.3 5.0 - 15.0 mmol/L    eGFR 108.5 >60.0 mL/min/1.73   Lipase    Collection Time: 02/08/23  5:58 PM    Specimen: Blood   Result Value Ref Range    Lipase 16 13 - 60 U/L   Urinalysis With Microscopic If Indicated (No Culture) - Urine, Clean Catch    Collection Time: 02/08/23  5:58 PM    Specimen: Urine, Clean Catch   Result Value Ref Range     Color, UA Yellow Yellow, Straw    Appearance, UA Clear Clear    pH, UA 5.5 5.0 - 8.0    Specific Gravity, UA >=1.030 1.005 - 1.030    Glucose, UA >=1000 mg/dL (3+) (A) Negative    Ketones, UA 15 mg/dL (1+) (A) Negative    Bilirubin, UA Negative Negative    Blood, UA Negative Negative    Protein, UA 30 mg/dL (1+) (A) Negative    Leuk Esterase, UA Negative Negative    Nitrite, UA Negative Negative    Urobilinogen, UA 1.0 E.U./dL 0.2 - 1.0 E.U./dL   Green Top (Gel)    Collection Time: 02/08/23  5:58 PM   Result Value Ref Range    Extra Tube Hold for add-ons.    Lavender Top    Collection Time: 02/08/23  5:58 PM   Result Value Ref Range    Extra Tube hold for add-on    Gold Top - SST    Collection Time: 02/08/23  5:58 PM   Result Value Ref Range    Extra Tube Hold for add-ons.    Light Blue Top    Collection Time: 02/08/23  5:58 PM   Result Value Ref Range    Extra Tube Hold for add-ons.    CBC Auto Differential    Collection Time: 02/08/23  5:58 PM    Specimen: Blood   Result Value Ref Range    WBC 17.94 (H) 3.40 - 10.80 10*3/mm3    RBC 5.74 4.14 - 5.80 10*6/mm3    Hemoglobin 16.6 13.0 - 17.7 g/dL    Hematocrit 48.6 37.5 - 51.0 %    MCV 84.7 79.0 - 97.0 fL    MCH 28.9 26.6 - 33.0 pg    MCHC 34.2 31.5 - 35.7 g/dL    RDW 12.1 (L) 12.3 - 15.4 %    RDW-SD 37.3 37.0 - 54.0 fl    MPV 9.5 6.0 - 12.0 fL    Platelets 283 140 - 450 10*3/mm3    Neutrophil % 80.9 (H) 42.7 - 76.0 %    Lymphocyte % 11.6 (L) 19.6 - 45.3 %    Monocyte % 6.6 5.0 - 12.0 %    Eosinophil % 0.2 (L) 0.3 - 6.2 %    Basophil % 0.3 0.0 - 1.5 %    Immature Grans % 0.4 0.0 - 0.5 %    Neutrophils, Absolute 14.51 (H) 1.70 - 7.00 10*3/mm3    Lymphocytes, Absolute 2.08 0.70 - 3.10 10*3/mm3    Monocytes, Absolute 1.18 (H) 0.10 - 0.90 10*3/mm3    Eosinophils, Absolute 0.03 0.00 - 0.40 10*3/mm3    Basophils, Absolute 0.06 0.00 - 0.20 10*3/mm3    Immature Grans, Absolute 0.08 (H) 0.00 - 0.05 10*3/mm3    nRBC 0.0 0.0 - 0.2 /100 WBC   Urinalysis, Microscopic Only - Urine,  Clean Catch    Collection Time: 02/08/23  5:58 PM    Specimen: Urine, Clean Catch   Result Value Ref Range    RBC, UA 0-2 None Seen, 0-2 /HPF    WBC, UA 0-2 None Seen, 0-2 /HPF    Bacteria, UA None Seen None Seen /HPF    Squamous Epithelial Cells, UA 0-2 None Seen, 0-2 /HPF    Hyaline Casts, UA 7-12 None Seen /LPF    Methodology Automated Microscopy    D-dimer, Quantitative    Collection Time: 02/08/23  5:58 PM    Specimen: Blood   Result Value Ref Range    D-Dimer, Quantitative 0.50 0.00 - 0.50 MCGFEU/mL   ECG 12 Lead Other; Left chest pain    Collection Time: 02/08/23  8:50 PM   Result Value Ref Range    QT Interval 346 ms       Ordered the above labs and independently reviewed the results.        RADIOLOGY  CT Abdomen Pelvis Without Contrast    Result Date: 2/8/2023  CT ABDOMEN PELVIS WO CONTRAST-  INDICATIONS: Left flank pain  TECHNIQUE: Radiation dose reduction techniques were utilized, including automated exposure control and exposure modulation based on body size. Unenhanced ABDOMEN AND PELVIS CT  COMPARISON: 11/30/2019  FINDINGS:  Unremarkable unenhanced appearance of the liver, gallbladder, spleen, adrenal glands, pancreas, kidneys, bladder.  No bowel obstruction or abnormal bowel thickening is identified. The appendix does not appear inflamed.  No free intraperitoneal gas or free fluid. Small umbilical hernia of fat is present.  Scattered small mesenteric and para-aortic lymph nodes are seen that are not significant by size criteria.  Abdominal aorta is not aneurysmal.  The lung bases are clear.  Degenerative changes are seen in the spine. No acute fracture is identified.          1. No urolithiasis or hydronephrosis. 2. No acute inflammatory process of bowel is identified, follow up as indications persist.  This report was finalized on 2/8/2023 7:50 PM by Dr. Estevan Man M.D.        I ordered the above noted radiological studies. Independently reviewed by me and discussed with radiologist.  See  dictation above for official radiology interpretation.      PROCEDURES    Procedures        MEDICATIONS GIVEN IN ER    Medications   sodium chloride 0.9 % flush 10 mL (has no administration in time range)   sodium chloride 0.9 % flush 10 mL (has no administration in time range)   sodium chloride 0.9 % flush 10 mL (has no administration in time range)   sodium chloride 0.9 % infusion 40 mL (has no administration in time range)   acetaminophen (TYLENOL) tablet 650 mg (has no administration in time range)   ondansetron (ZOFRAN) tablet 4 mg (has no administration in time range)     Or   ondansetron (ZOFRAN) injection 4 mg (has no administration in time range)   ondansetron (ZOFRAN) injection 4 mg (4 mg Intravenous Given 2/8/23 2009)   sodium chloride 0.9 % bolus 500 mL (500 mL Intravenous New Bag 2/8/23 2007)   morphine injection 4 mg (4 mg Intravenous Given 2/8/23 2009)         PROGRESS, DATA ANALYSIS, CONSULTS, AND MEDICAL DECISION MAKING    All labs have been independently interpreted by me.  All radiology studies have been interpreted by me.  Discussion below represents my analysis of pertinent findings related to patient's condition, differential diagnosis, treatment plan and final disposition.    I have concern for infectious etiology to the patient's symptoms.  In light of moderate leukocytosis and persistent pain despite the use of narcotics I plan to admit him to observation unit overnight for monitoring of symptoms and possible consult with general surgery in the morning if necessary.    - Chronic or social conditions impacting care: Diabetes      DIFFERENTIAL DIAGNOSIS INCLUDE BUT NOT LIMITED TO:     Differential diagnosis includes but is not limited to:  - Hepatobiliary pathology such as cholecystitis, cholangitis, and symptomatic cholelithiasis  - Pancreatitis  - Dyspepsia  - Small bowel obstruction  - Appendicitis  - Diverticulitis  - UTI including pyelonephritis  - Ureteral stone  - Zoster  - Colitis,  including infectious and ischemic  - Atypical ACS      Orders placed during this visit:  Orders Placed This Encounter   Procedures   • CT Abdomen Pelvis Without Contrast   • [place] Draw   • Comprehensive Metabolic Panel   • Lipase   • Urinalysis With Microscopic If Indicated (No Culture) - Urine, Clean Catch   • CBC Auto Differential   • Urinalysis, Microscopic Only - Urine, Clean Catch   • D-dimer, Quantitative   • Troponin   • Basic Metabolic Panel   • CBC (No Diff)   • NPO Diet NPO Type: Strict NPO   • Undress & Gown   • Cardiac Monitoring   • Vital Signs   • Up With Assistance   • Intake & Output   • Weigh Patient   • Oral Care   • Saline Lock & Maintain IV Access   • Place Sequential Compression Device   • Maintain Sequential Compression Device   • Code Status and Medical Interventions:   • Oxygen Therapy- Nasal Cannula; Titrate for SPO2: 90% - 95%   • ECG 12 Lead Other; Left chest pain   • Insert Peripheral IV   • Insert Peripheral IV   • Initiate ED Observation Status   • CBC & Differential   • Green Top (Gel)   • Lavender Top   • Gold Top - SST   • Light Blue Top         ED Course as of 02/08/23 2119 Wed Feb 08, 2023 1955 WBC(!): 17.94 [DC]   2108 I discussed the case with CHELSEA oneill with Obs unit at this time regarding the patient.  I discussed work-up, results, concerns.  I discussed the consulting provider's desire for obs admit.   [DC]      ED Course User Index  [DC] Brigido Thakkar PA       AS OF 21:19 EST VITALS:    BP - 109/71  HR - 96  TEMP - 98.8 °F (37.1 °C)  02 SATS - 97%    2109 I rechecked the patient.  I discussed the patient's labs, radiology findings (including all incidental findings), diagnosis, and plan for obs admit.  The patient understands and agrees with the plan, and is ready for admit.  All questions answered.        DIAGNOSIS  Final diagnoses:   Left sided abdominal pain   Leukocytosis, unspecified type         DISPOSITION  Admit    Pt masked in first look. I wore a surgical  mask throughout my encounters with the pt. I performed hand hygiene on entry into the pt room and upon exit.     Dictated utilizing Dragon dictation     Note Disclaimer: At [facility], we believe that sharing information builds trust and better relationships. You are receiving this note because you recently visited [facility]. It is possible you will see health information before a provider has talked with you about it. This kind of information can be easy to misunderstand. To help you fully understand what it means for your health, we urge you to discuss this note with your provider.      Brigido Thakkar PA  02/08/23 2109       Brigido Thakkar PA  02/08/23 2119

## 2023-02-09 NOTE — CONSULTS
Imaging ordered but is not available at this time, neurosurgery to see when complete. Please preform neuro checks every 4 hours in the interim.    Maru Reed, APRN  2/9/23  1333

## 2023-02-09 NOTE — PROGRESS NOTES
MD ATTESTATION NOTE    The CHELSEA and I have discussed this patient's history, physical exam, and treatment plan.  I have reviewed the documentation and personally had a face to face interaction with the patient. I affirm the documentation and agree with the treatment and plan.  The attached note describes my personal findings.      I provided a substantive portion of the care of the patient.  I personally performed the physical exam in its entirety, and below are my findings.  For this patient encounter, the patient wore surgical mask, I wore full protective PPE including N95 and eye protection    Brief HPI: This is a pleasant [age]-year-old male with history of morbid obesity, hypertension diabetes, dyslipidemia who presents with left-sided back pain.  This pain did start soon after he pulled a motor out of the vehicle last night.  When he was done working he noticed that he was having some pain in his back.  Does have a history of back problems and describes and states that his back goes out at times.  Pain is worse with movement.  He also reported subjective fevers and chills that night.  The pain again is on the whole left side left flank with radiation to his left upper quadrant left lower aspect of his chest down the left lower quadrant of his abdomen to his left testicle.  This pain again is worse with movement such as getting up or rolling over.  He denies any nausea vomiting or diarrhea.  Denies any urinary or fecal incontinence or retention.  Denies any saddle anesthesia.  Denies any new weakness to the legs.  There is no radiation to legs.  He does have some improvement of his pain compared to yesterday when he is admitted to the hospital.  He was admitted for further evaluation of this pain and his leukocytosis.      General : Young male patient is awake alert and oriented.  Patient does not appear septic or toxic  HEENT: NCAT  CV: Heart is regular with no murmurs  Respiratory: CTA bilaterally  Abd: Soft  and morbidly obese.  Location of pain is just left of midline in the left flank radiates under the left side of his chest and right at the costophrenic angle as well as his left upper quadrant of his abdomen left lower quadrant into his left testicle.  His belly is soft.  There is no guarding or rebound.  Has normal bowel sounds  GENITOURINARY: Normal phallus. Bilaterally descended testes without masses. No scrotal masses. No hernia.  No inguinal adenopathy.  No rash or signs of infection. No urethral discharge. Nontender to palpation.  Back exam: I do not appreciate any focal tenderness in palpation to his cervical thoracic and lumbar spine.  His pain is more left of midline as mentioned above left flank with radiation as mentioned above.  Normal inspection.  Pain is reproducible with palpation as well as with movement such as getting up and twisting.  There is a component that is worse with deep breathing which is likely related to movement of his paraspinal muscles.  He denies being short of breath  Ext: No acute abnormalities.  Intact distal pulses to upper and lower extremities are equal strong and symmetric.  Skin: No rash  Neuro: Cranial nerves II through XII grossly intact as tested.  No acute lateralizing deficits.  Psych: Normal mood and affect    I have reviewed the patient's vital signs, lab work, EKG and imaging.    Plan:  1.  Left flank, back pain.  Also her left lower quadrant abdominal pain:.  So far the patient's work-up is unremarkable he has a CT scan without contrast of his abdomen pelvis as well as with his thoracic and lumbar spine.  There is degenerative changes seen on the CT scan of the thoracic and lumbar spine.  CT scan of the abdomen pelvis was unremarkable.  Patient has remained with persistent leukocytosis about 16,000.  Patient's procalcitonin and lactic acid are now normal.  Blood cultures are pending.  Patient did have a dimer that was normal.  This pain does sound somewhat  musculoskeletal but why does he have the subjective fevers.  Had a temperature of 99.8 last night.  And he still has persistent leukocytosis.  We will check a sed rate and CRP and check a CT scan of the chest abdomen pelvis with IV contrast.  It is very possible this patient will need to be transition to a full admit for persistent pain and persistent leukocytosis.  Pain has improved from yesterday but is still present.  I do not believe this is cardiac in etiology given his description of pain.  He has had 2 negative troponins.  Discussed the plan with the patient as well as Radha the midlevel provider.  All questions answered        Note Disclaimer: At [facility], we believe that sharing information builds trust and better relationships. You are receiving this note because you recently visited [facility]. It is possible you will see health information before a provider has talked with you about it. This kind of information can be easy to misunderstand. To help you fully understand what it means for your health, we urge you to discuss this note with your provider.    13:40 EST  CT angiogram of the chest as well as CT scan of the abdomen pelvis report was reviewed.  There was no obvious central pulmonary embolism.  I think PE is very unlikely and the scope of an unremarkable CT angiogram and a normal dimer.  His CT scan of his abdomen pelvis is also unremarkable.  His history is not 100% consistent with PAD.  He does have an elevation of his CRP at 10 and his sed rate is 68.  I am concerned this gentleman could have potential infectious etiology.  I cannot find the etiology at this time.  I think he is getting need an MRI of his thoracic and lumbar spine given his location of pain.  This patient will need to be transferred from the observation unit to full admission.  I discussed the plan with Radha the midlevel provider.  Blood cultures have been collected and are pending.  We will consult the admitting  physician to see if they would like us to presumptively start him on antibiotics at this time.  Patient's procalcitonin and lactic acid are normal.

## 2023-02-09 NOTE — PLAN OF CARE
Goal Outcome Evaluation:         Patient admitted to the observation unit with complaints of abdominal pain. After further testing, patient diagnosis of back pain charted. Vss. Will continue to monitor for any changes

## 2023-02-09 NOTE — PAYOR COMM NOTE
"Osvaldo Bella (37 y.o. Male)     ATTN: REQUESTING INPATIENT AUTHORIZATION: [id number]    PATIENT WAS OBSERVATION AND CONVERTED TO INPT 02/09/23    PLEASE REPLY TO UR DEPT: [phone],  [phone]    [facility]: NPI [id number]    ASHLEY STROUD MD: DALLINI [id number]    DX: M54.50, R10.9      Date of Birth   1985    Social Security Number       Address   [address] [place] [id number]    Home Phone   [phone]    MRN   [id number]       [employer]   None    Marital Status   Single                            Admission Date   2/8/23    Admission Type   Emergency    Admitting Provider   Ashley Stroud MD    Attending Provider   Ashley Stroud MD    Department, Room/Bed   [facility] OBSERVATION, 110/1       Discharge Date       Discharge Disposition       Discharge Destination                               Attending Provider: Ashley Stroud MD    Allergies: Metformin    Isolation: None   Infection: None   Code Status: CPR    Ht: 188 cm (74\")   Wt: 141 kg (310 lb)    Admission Cmt: None   Principal Problem: Abdominal pain [R10.9]                 Active Insurance as of 2/8/2023     Primary Coverage     Payor Plan Insurance Group Employer/Plan Group    [employer]HE[employer] MEDICAID [place] MEDICAID [id number]     Payor Plan Address Payor Plan Phone Number Payor Plan Fax Number Effective Dates    [address] [address] [phone]  10/2/2019 - None Entered    [facility] [id number]       Subscriber Name Subscriber Birth Date Member ID       OSVALDO BELLA 1985 [id number]                 Emergency Contacts      (Rel.) Home Phone Work Phone Mobile Phone    Pamela Bella \"Kate\" (Mother) [phone] -- [phone]    Evonne Webster (Friend) -- -- [phone]               History & Physical      AneudyAlfreda, MIGUEL at 02/08/23 2208     Attestation signed by Rudy Berger MD at 02/09/23 1245    MD Attestation Note    I supervised care provided by the midlevel provider.  "   The CHELSEA and I have discussed this patient's history, physical exam, and treatment plan.   I provided a substantive portion of the care of this patient.  I have reviewed the documentation and personally had a face to face interaction with the patient  I affirm the documentation and agree with the treatment and plan.   My personal findings are documented in a separate note.                     [facility]   HISTORY AND PHYSICAL    Patient Name: Osvaldo Welsh  : 1985  MRN: [id number]  Primary Care Physician:  Chuy Hays APRN  Date of admission: 2023    Subjective   Subjective     Chief Complaint: Back pain, left flank pain, left chest wall/rib pain    History of Present Illness  Osvaldo Welsh is a 37-year-old male, with a past medical history of hypertension, diabetes, and hyperlipidemia, presented to the emergency department with a complaint of left-sided flank pain that radiates to his left lower abdomen.  He states that he was pulling a motor out of the vehicle last night and when he was done he noticed that his back was bothering him as it has done historically.  He states that he went home went to bed and slept all night but when he got up this morning he noticed the pain in his left flank that got worse so he came to the emergency department.  He denies any nausea, vomiting, urinary retention, urinary urgency, bowel or bladder dysfunction.  Review of Systems   Constitutional: Negative.    HENT: Negative.    Eyes: Negative.    Respiratory: Negative.  Negative for shortness of breath.    Cardiovascular: Negative.  Negative for chest pain and palpitations.   Gastrointestinal: Negative.  Negative for abdominal pain, constipation, diarrhea, nausea and vomiting.   Endocrine: Negative.    Genitourinary: Positive for flank pain. Negative for difficulty urinating, testicular pain and urgency.   Musculoskeletal: Positive for back pain. Negative for neck pain.   Skin: Negative.     Allergic/Immunologic: Negative.    Neurological: Negative.    Hematological: Negative.    Psychiatric/Behavioral: Negative.         Personal History     Past Medical History:   Diagnosis Date   • Asthma    • Diabetes (HCC)    • Hyperlipidemia    • Hypertension    • Optic nerve hemorrhage, left        Past Surgical History:   Procedure Laterality Date   • TONSILLECTOMY AND ADENOIDECTOMY         Family History: family history includes Diabetes in his father, paternal grandfather, and paternal grandmother; Hypertension in his maternal grandfather and maternal grandmother. Otherwise pertinent FHx was reviewed and not pertinent to current issue.    Social History:  reports that he has never smoked. He has never used smokeless tobacco. He reports current alcohol use. He reports that he does not use drugs.    Home Medications:  Accu-Chek Maris Plus, BASAGLAR KWIKPEN, Insulin Pen Needle, Medihoney Wound/Burn Dressing, TRUEplus Lancets 28G, aspirin, atorvastatin, dapagliflozin Propanediol, glucose blood, lisinopril-hydrochlorothiazide, tadalafil, and vitamin D    Allergies:  Allergies   Allergen Reactions   • Metformin Other (See Comments)     Fatigue and muscle aches       Objective    Objective     Vitals:   Temp:  [98.4 °F (36.9 °C)-98.8 °F (37.1 °C)] 98.4 °F (36.9 °C)  Heart Rate:  [] 103  Resp:  [18-20] 18  BP: (100-150)/() 100/67    Physical Exam  Vitals and nursing note reviewed.   Constitutional:       General: He is not in acute distress.     Appearance: Normal appearance.   Cardiovascular:      Rate and Rhythm: Normal rate and regular rhythm.      Pulses: Normal pulses.      Heart sounds: Normal heart sounds.   Pulmonary:      Effort: Pulmonary effort is normal.      Breath sounds: Normal breath sounds.   Abdominal:      General: Bowel sounds are normal.      Palpations: Abdomen is soft.   Musculoskeletal:         General: Tenderness present. No swelling or signs of injury.      Thoracic back:  Tenderness present. No spasms. Decreased range of motion.      Lumbar back: Tenderness present. No spasms. Negative right straight leg raise test and negative left straight leg raise test.      Right lower leg: No edema.      Left lower leg: No edema.      Comments: Tenderness radiates from back to flank   Skin:     General: Skin is warm and dry.      Capillary Refill: Capillary refill takes less than 2 seconds.   Neurological:      General: No focal deficit present.      Mental Status: He is alert and oriented to person, place, and time.   Psychiatric:         Mood and Affect: Mood normal.         Behavior: Behavior normal.         Thought Content: Thought content normal.         Judgment: Judgment normal.         Result Review    Result Review:  I have personally reviewed the results from the time of this admission to 2/8/2023 22:08 EST and agree with these findings:  [x]  Laboratory list / accordion  []  Microbiology  [x]  Radiology  [x]  EKG/Telemetry   []  Cardiology/Vascular   []  Pathology  [x]  Old records  []  Other:        Assessment & Plan   Assessment / Plan     Brief Patient Summary:  Osvaldo Welsh is a 37 y.o. male who was admitted to the observation unit for further evaluation and treatment of his left flank pain, back pain, left chest wall pain.    Active Hospital Problems:  Active Hospital Problems    Diagnosis    • **Abdominal pain      Plan:   Left flank pain/back pain/chest wall pain  -CT thoracic spine-no acute fracture or subluxation of the thoracic spine is seen  -CT lumbar spine-no acute fracture or subluxation of the lumbar spine is seen.          Degenerative changes, most significantly at L4-L5 and L5-S1.  -Robaxin 4 times daily  -As needed pain medication  -Lactate-pending  -Procalcitonin-pending  -Blood cultures x2-pending  -Repeat labs in a.m.      Diabetes  -Accu-Cheks AC  -Adult subcutaneous insulin management-moderate dose  -Hemoglobin [id number]      DVT prophylaxis:  Mechanical  DVT prophylaxis orders are present.    CODE STATUS:    Code Status (Patient has no pulse and is not breathing): CPR (Attempt to Resuscitate)  Medical Interventions (Patient has pulse or is breathing): Full Support    Admission Status:  I believe this patient meets observation status.    81 minutes has been spent by [facility] providers in the care of this patient while under observation status.    MIGUEL Mcguire    Electronically signed by Rudy Berger MD at 02/09/23 1245          Emergency Department Notes      Santa Yang, RN at 02/08/23 1742        Patient to ER via car from home for l flnak pain around to front  Patient denies any urinary symptoms      Patient wearing mask this RN in PPE    Electronically signed by Santa Yang RN at 02/08/23 1742     Brigido Thakkar PA at 02/08/23 1910     Attestation signed by Dillon Castaneda MD at 02/09/23 0210        SHARED APC FACE TO FACE: This visit was performed by both the physician and an APC. I personally evaluated and examined the patient. I performed the entirety of the physical exam and I documented this exam in this attestation or in accompanying documentation.    Dillon Castaneda MD 2/9/2023 02:10 EST                          EMERGENCY DEPARTMENT ENCOUNTER    Room Number:  06/06  Date of encounter:  2/8/2023  PCP: Chuy Hays APRN  Historian: Patient  Full history not obtainable due to: None    HPI:  Chief Complaint: Abdominal pain    Context: Osvaldo Welsh is a 37 y.o. male with a PMH significant for type 2 diabetes, hypertension, DVT who presents to the ED c/o a 2-day history of left flank pain that radiates towards the left lower quadrant.  He describes an aching sensation that has been waxing and waning in intensity but never completely resolved since onset.  He admits to nausea without vomiting.  No recent abdominal surgeries or antibiotic use.  Denies fever, chills, change to bowel habits or  urination.      MEDICAL RECORD REVIEW:    Upon review of the medical record it appears the patient's most recent evaluation was in the office with vascular surgery for lower leg edema on November 22, 2022    PAST MEDICAL HISTORY    Active Ambulatory Problems     Diagnosis Date Noted   • Closed displaced fracture of navicular bone of left foot 08/02/2016   • Sprain 08/02/2016   • Closed nondisplaced fracture of cuboid bone of right foot 08/15/2016   • Sprain of right ankle 09/08/2016   • Arthritis of ankle 03/03/2017   • Peroneal tendonitis 03/03/2017   • Ankle impingement syndrome 03/03/2017   • Left ankle pain 03/03/2017   • Tendonitis, Achilles, left 03/27/2017   • Type 2 diabetes mellitus with hyperglycemia, with long-term current use of insulin ([facility]) 12/20/2017   • Essential hypertension 12/20/2017   • Class 2 obesity without serious comorbidity with body mass index (BMI) of 38.0 to 38.9 in adult 12/20/2017   • Diabetic retinopathy ([facility]) 03/21/2018   • Injury of peroneal tendon of right foot 01/31/2019   • Closed nondisplaced fracture of third metatarsal bone of right foot 01/31/2019   • Peroneal tendonitis, left 02/14/2019   • Plantar fasciitis 02/14/2019   • Bone marrow edema 02/14/2019   • Peroneal tendon tear, right, subsequent encounter 07/18/2019   • Nontraumatic tear of plantar fascia 07/18/2019   • Calcific Achilles tendonitis 07/18/2019   • Acute deep vein thrombosis (DVT) of distal vein of right lower extremity ([facility]) 07/18/2019   • Ulcer of left foot, with fat layer exposed ([facility]) 07/18/2019   • Dyslipidemia 10/17/2019   • Vitamin D deficiency disease 10/17/2019   • Erectile disorder, acquired, situational, severe 04/07/2020     Resolved Ambulatory Problems     Diagnosis Date Noted   • No Resolved Ambulatory Problems     Past Medical History:   Diagnosis Date   • Asthma    • Diabetes (CMS/[facility])    • Hyperlipidemia    • Hypertension    • Optic nerve hemorrhage, left          PAST SURGICAL HISTORY  Past  Surgical History:   Procedure Laterality Date   • TONSILLECTOMY AND ADENOIDECTOMY           FAMILY HISTORY  Family History   Problem Relation Age of Onset   • Diabetes Father    • Hypertension Maternal Grandmother    • Hypertension Maternal Grandfather    • Diabetes Paternal Grandmother    • Diabetes Paternal Grandfather          SOCIAL HISTORY  Social History     Socioeconomic History   • Marital status: Single   Tobacco Use   • Smoking status: Never   • Smokeless tobacco: Never   Vaping Use   • Vaping Use: Never used   Substance and Sexual Activity   • Alcohol use: No   • Drug use: No   • Sexual activity: Defer         ALLERGIES  Metformin        REVIEW OF SYSTEMS    All systems reviewed and marked as negative except as listed in HPI     PHYSICAL EXAM    I have reviewed the triage vital signs and nursing notes.    ED Triage Vitals   Temp Heart Rate Resp BP SpO2   02/08/23 1742 02/08/23 1742 02/08/23 1742 02/08/23 1743 02/08/23 1742   98.8 °F (37.1 °C) 119 20 (!) 150/116 97 %      Temp src Heart Rate Source Patient Position BP Location FiO2 (%)   -- -- -- -- --              Physical Exam  Constitutional:       General: He is not in acute distress.     Appearance: He is well-developed.   HENT:      Head: Normocephalic and atraumatic.   Eyes:      General: No scleral icterus.     Conjunctiva/sclera: Conjunctivae normal.   Neck:      Trachea: No tracheal deviation.   Cardiovascular:      Rate and Rhythm: Regular rhythm. Tachycardia present.   Pulmonary:      Effort: Pulmonary effort is normal.      Breath sounds: Normal breath sounds.   Abdominal:      Palpations: Abdomen is soft.      Tenderness: There is abdominal tenderness in the left upper quadrant and left lower quadrant. There is no guarding.   Musculoskeletal:         General: No deformity.      Cervical back: Normal range of motion.   Lymphadenopathy:      Cervical: No cervical adenopathy.   Skin:     General: Skin is warm and dry.   Neurological:      Mental  Status: He is alert and oriented to person, place, and time.   Psychiatric:         Behavior: Behavior normal.         Vital signs and nursing notes reviewed.            LAB RESULTS  Recent Results (from the past 24 hour(s))   Comprehensive Metabolic Panel    Collection Time: 02/08/23  5:58 PM    Specimen: Blood   Result Value Ref Range    Glucose 174 (H) 65 - 99 mg/dL    BUN 21 (H) 6 - 20 mg/dL    Creatinine 0.92 0.76 - 1.27 mg/dL    Sodium 135 (L) 136 - 145 mmol/L    Potassium 4.2 3.5 - 5.2 mmol/L    Chloride 97 (L) 98 - 107 mmol/L    CO2 25.7 22.0 - 29.0 mmol/L    Calcium 9.5 8.6 - 10.5 mg/dL    Total Protein 8.0 6.0 - 8.5 g/dL    Albumin 4.3 3.5 - 5.2 g/dL    ALT (SGPT) 23 1 - 41 U/L    AST (SGOT) 13 1 - 40 U/L    Alkaline Phosphatase 140 (H) 39 - 117 U/L    Total Bilirubin 0.4 0.0 - 1.2 mg/dL    Globulin 3.7 gm/dL    A/G Ratio 1.2 g/dL    BUN/Creatinine Ratio 22.8 7.0 - 25.0    Anion Gap 12.3 5.0 - 15.0 mmol/L    eGFR 108.5 >60.0 mL/min/1.73   Lipase    Collection Time: 02/08/23  5:58 PM    Specimen: Blood   Result Value Ref Range    Lipase 16 13 - 60 U/L   Urinalysis With Microscopic If Indicated (No Culture) - Urine, Clean Catch    Collection Time: 02/08/23  5:58 PM    Specimen: Urine, Clean Catch   Result Value Ref Range    Color, UA Yellow Yellow, Straw    Appearance, UA Clear Clear    pH, UA 5.5 5.0 - 8.0    Specific Gravity, UA >=1.030 1.005 - 1.030    Glucose, UA >=1000 mg/dL (3+) (A) Negative    Ketones, UA 15 mg/dL (1+) (A) Negative    Bilirubin, UA Negative Negative    Blood, UA Negative Negative    Protein, UA 30 mg/dL (1+) (A) Negative    Leuk Esterase, UA Negative Negative    Nitrite, UA Negative Negative    Urobilinogen, UA 1.0 E.U./dL 0.2 - 1.0 E.U./dL   Green Top (Gel)    Collection Time: 02/08/23  5:58 PM   Result Value Ref Range    Extra Tube Hold for add-ons.    Lavender Top    Collection Time: 02/08/23  5:58 PM   Result Value Ref Range    Extra Tube hold for add-on    Gold Top - SST     Collection Time: 02/08/23  5:58 PM   Result Value Ref Range    Extra Tube Hold for add-ons.    Light Blue Top    Collection Time: 02/08/23  5:58 PM   Result Value Ref Range    Extra Tube Hold for add-ons.    CBC Auto Differential    Collection Time: 02/08/23  5:58 PM    Specimen: Blood   Result Value Ref Range    WBC 17.94 (H) 3.40 - 10.80 10*3/mm3    RBC 5.74 4.14 - 5.80 10*6/mm3    Hemoglobin 16.6 13.0 - 17.7 g/dL    Hematocrit 48.6 37.5 - 51.0 %    MCV 84.7 79.0 - 97.0 fL    MCH 28.9 26.6 - 33.0 pg    MCHC 34.2 31.5 - 35.7 g/dL    RDW 12.1 (L) 12.3 - 15.4 %    RDW-SD 37.3 37.0 - 54.0 fl    MPV 9.5 6.0 - 12.0 fL    Platelets 283 140 - 450 10*3/mm3    Neutrophil % 80.9 (H) 42.7 - 76.0 %    Lymphocyte % 11.6 (L) 19.6 - 45.3 %    Monocyte % 6.6 5.0 - 12.0 %    Eosinophil % 0.2 (L) 0.3 - 6.2 %    Basophil % 0.3 0.0 - 1.5 %    Immature Grans % 0.4 0.0 - 0.5 %    Neutrophils, Absolute 14.51 (H) 1.70 - 7.00 10*3/mm3    Lymphocytes, Absolute 2.08 0.70 - 3.10 10*3/mm3    Monocytes, Absolute 1.18 (H) 0.10 - 0.90 10*3/mm3    Eosinophils, Absolute 0.03 0.00 - 0.40 10*3/mm3    Basophils, Absolute 0.06 0.00 - 0.20 10*3/mm3    Immature Grans, Absolute 0.08 (H) 0.00 - 0.05 10*3/mm3    nRBC 0.0 0.0 - 0.2 /100 WBC   Urinalysis, Microscopic Only - Urine, Clean Catch    Collection Time: 02/08/23  5:58 PM    Specimen: Urine, Clean Catch   Result Value Ref Range    RBC, UA 0-2 None Seen, 0-2 /HPF    WBC, UA 0-2 None Seen, 0-2 /HPF    Bacteria, UA None Seen None Seen /HPF    Squamous Epithelial Cells, UA 0-2 None Seen, 0-2 /HPF    Hyaline Casts, UA 7-12 None Seen /LPF    Methodology Automated Microscopy    D-dimer, Quantitative    Collection Time: 02/08/23  5:58 PM    Specimen: Blood   Result Value Ref Range    D-Dimer, Quantitative 0.50 0.00 - 0.50 MCGFEU/mL   ECG 12 Lead Other; Left chest pain    Collection Time: 02/08/23  8:50 PM   Result Value Ref Range    QT Interval 346 ms       Ordered the above labs and independently reviewed  the results.        RADIOLOGY  CT Abdomen Pelvis Without Contrast    Result Date: 2/8/2023  CT ABDOMEN PELVIS WO CONTRAST-  INDICATIONS: Left flank pain  TECHNIQUE: Radiation dose reduction techniques were utilized, including automated exposure control and exposure modulation based on body size. Unenhanced ABDOMEN AND PELVIS CT  COMPARISON: 11/30/2019  FINDINGS:  Unremarkable unenhanced appearance of the liver, gallbladder, spleen, adrenal glands, pancreas, kidneys, bladder.  No bowel obstruction or abnormal bowel thickening is identified. The appendix does not appear inflamed.  No free intraperitoneal gas or free fluid. Small umbilical hernia of fat is present.  Scattered small mesenteric and para-aortic lymph nodes are seen that are not significant by size criteria.  Abdominal aorta is not aneurysmal.  The lung bases are clear.  Degenerative changes are seen in the spine. No acute fracture is identified.          1. No urolithiasis or hydronephrosis. 2. No acute inflammatory process of bowel is identified, follow up as indications persist.  This report was finalized on 2/8/2023 7:50 PM by Dr. Estevan Man M.D.        I ordered the above noted radiological studies. Independently reviewed by me and discussed with radiologist.  See dictation above for official radiology interpretation.      PROCEDURES    Procedures        MEDICATIONS GIVEN IN ER    Medications   sodium chloride 0.9 % flush 10 mL (has no administration in time range)   sodium chloride 0.9 % flush 10 mL (has no administration in time range)   sodium chloride 0.9 % flush 10 mL (has no administration in time range)   sodium chloride 0.9 % infusion 40 mL (has no administration in time range)   acetaminophen (TYLENOL) tablet 650 mg (has no administration in time range)   ondansetron (ZOFRAN) tablet 4 mg (has no administration in time range)     Or   ondansetron (ZOFRAN) injection 4 mg (has no administration in time range)   ondansetron (ZOFRAN)  injection 4 mg (4 mg Intravenous Given 2/8/23 2009)   sodium chloride 0.9 % bolus 500 mL (500 mL Intravenous New Bag 2/8/23 2007)   morphine injection 4 mg (4 mg Intravenous Given 2/8/23 2009)         PROGRESS, DATA ANALYSIS, CONSULTS, AND MEDICAL DECISION MAKING    All labs have been independently interpreted by me.  All radiology studies have been interpreted by me.  Discussion below represents my analysis of pertinent findings related to patient's condition, differential diagnosis, treatment plan and final disposition.    I have concern for infectious etiology to the patient's symptoms.  In light of moderate leukocytosis and persistent pain despite the use of narcotics I plan to admit him to observation unit overnight for monitoring of symptoms and possible consult with general surgery in the morning if necessary.    - Chronic or social conditions impacting care: Diabetes      DIFFERENTIAL DIAGNOSIS INCLUDE BUT NOT LIMITED TO:     Differential diagnosis includes but is not limited to:  - Hepatobiliary pathology such as cholecystitis, cholangitis, and symptomatic cholelithiasis  - Pancreatitis  - Dyspepsia  - Small bowel obstruction  - Appendicitis  - Diverticulitis  - UTI including pyelonephritis  - Ureteral stone  - Zoster  - Colitis, including infectious and ischemic  - Atypical ACS      Orders placed during this visit:  Orders Placed This Encounter   Procedures   • CT Abdomen Pelvis Without Contrast   • [place] Draw   • Comprehensive Metabolic Panel   • Lipase   • Urinalysis With Microscopic If Indicated (No Culture) - Urine, Clean Catch   • CBC Auto Differential   • Urinalysis, Microscopic Only - Urine, Clean Catch   • D-dimer, Quantitative   • Troponin   • Basic Metabolic Panel   • CBC (No Diff)   • NPO Diet NPO Type: Strict NPO   • Undress & Gown   • Cardiac Monitoring   • Vital Signs   • Up With Assistance   • Intake & Output   • Weigh Patient   • Oral Care   • Saline Lock & Maintain IV Access   • Place  Sequential Compression Device   • Maintain Sequential Compression Device   • Code Status and Medical Interventions:   • Oxygen Therapy- Nasal Cannula; Titrate for SPO2: 90% - 95%   • ECG 12 Lead Other; Left chest pain   • Insert Peripheral IV   • Insert Peripheral IV   • Initiate ED Observation Status   • CBC & Differential   • Green Top (Gel)   • Lavender Top   • Gold Top - SST   • Light Blue Top         ED Course as of 02/08/23 2119 Wed Feb 08, 2023 1955 WBC(!): 17.94 [DC]   2108 I discussed the case with CHELSEA oneill with Obs unit at this time regarding the patient.  I discussed work-up, results, concerns.  I discussed the consulting provider's desire for obs admit.   [DC]      ED Course User Index  [DC] Brigido Thakkar PA       AS OF 21:19 EST VITALS:    BP - 109/71  HR - 96  TEMP - 98.8 °F (37.1 °C)  02 SATS - 97%    2109 I rechecked the patient.  I discussed the patient's labs, radiology findings (including all incidental findings), diagnosis, and plan for obs admit.  The patient understands and agrees with the plan, and is ready for admit.  All questions answered.        DIAGNOSIS  Final diagnoses:   Left sided abdominal pain   Leukocytosis, unspecified type         DISPOSITION  Admit    Pt masked in first look. I wore a surgical mask throughout my encounters with the pt. I performed hand hygiene on entry into the pt room and upon exit.     Dictated utilizing Dragon dictation     Note Disclaimer: At [facility], we believe that sharing information builds trust and better relationships. You are receiving this note because you recently visited [facility]. It is possible you will see health information before a provider has talked with you about it. This kind of information can be easy to misunderstand. To help you fully understand what it means for your health, we urge you to discuss this note with your provider.      Brigido Thakkar PA  02/08/23 2109       Brigido Thakkar PA  02/08/23  2119      Electronically signed by Dillon Castaneda MD at 02/09/23 0210     Dillon Castaneda MD at 02/08/23 2054          Pt presents to the ED c/o  left flank pain rating down to the left lower quadrant as well as some radiation up towards the chest.  Has had nausea but no vomiting.  No diarrhea.  No dysuria or urinary urgency or frequency.  No fevers or chills or cough.  Pain started abruptly earlier today.  Does seem to be worse with certain positional movements.      On exam,   General: Appears uncomfortable, nontoxic, nondiaphoretic  HEENT: Mucous membranes moist, atraumatic, EOMI  Neck: Full ROM  Pulm: Symmetric chest rise, nonlabored, lungs CTAB  Cardiovascular: Regular rate and rhythm, intact distal pulses  GI: Soft, reproducible tenderness left mid to lower abdomen, nondistended, no rebound, no guarding, bowel sounds present  MSK: Full ROM, no deformity  Skin: Warm, dry  Neuro: Awake, alert, oriented x 4, GCS 15, moving all extremities, no focal deficits  Psych: Calm, cooperative      N95, protective eye goggles, and gloves used during this encounter. Patient in surgical mask.    EKG    EKG Time: 2050  Rhythm/Rate: Sinus tachycardia with rate of 101  Borderline left axis deviation  Normal intervals  No acute ischemic changes  No STEMI     Interpreted Contemporaneously by me, independently viewed  No emergent changes compared to July 6, 2019        Plan:   ED Course as of 02/08/23 2352 Wed Feb 08, 2023 1955 WBC(!): 17.94 [DC]   2108 I discussed the case with CHELSEA oneill with Obs unit at this time regarding the patient.  I discussed work-up, results, concerns.  I discussed the consulting provider's desire for obs admit.   [DC]      ED Course User Index  [DC] Brigido Thakkar, PA     Patient's pain is not really significantly improved after treatment, does have leukocytosis 17,000.  CT scan is really fairly unremarkable with no acute source for infection, no evidence of any urolithiasis or ureteral  obstruction, no bowel obstruction, no evidence of colitis or appendicitis.  Unclear etiology of pain but he still appears quite uncomfortable with reproducible tenderness to palpation and leukocytosis, I think hospitalization to be warranted at this time.  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  Plan for observation unit stay.  All questions and concerns addressed.     Attestation:  The CHELSEA and I have discussed this patient's history, physical exam, and treatment plan.  I have reviewed the documentation and personally had a face to face interaction with the patient. I affirm the documentation and agree with the treatment and plan.  The attached note describes my personal findings.          Dillon Castaneda MD  02/09/23 0157      Electronically signed by Dillon Castaneda MD at 02/09/23 0157     Tere Sterling RN at 02/08/23 2113          Nursing report ED to floor  Osvaldo Welsh  37 y.o.  male    HPI :   Chief Complaint   Patient presents with   • Flank Pain   • Abdominal Pain       Admitting doctor:   Rudy Berger MD    Admitting diagnosis:   The primary encounter diagnosis was Left sided abdominal pain. A diagnosis of Leukocytosis, unspecified type was also pertinent to this visit.    Code status:   Current Code Status       Date Active Code Status Order ID Comments User Context       2/8/2023 2111 CPR (Attempt to Resuscitate) [id number]  Alfreda Amado, [facility]        Question Answer    Code Status (Patient has no pulse and is not breathing) CPR (Attempt to Resuscitate)    Medical Interventions (Patient has pulse or is breathing) Full Support                    Allergies:   Metformin    Isolation:   No active isolations    Intake and Output  No intake or output data in the 24 hours ending 02/08/23 2113    Weight:       02/08/23 1742   Weight: (!) 141 kg (310 lb)       Most recent vitals:   Vitals:     02/08/23 1743 02/08/23 1858 02/08/23 1903 02/08/23 2001   BP: (!) 150/116 127/93 120/72 109/71   Pulse:  104 102 96   Resp:       Temp:       SpO2:  97% 96% 97%   Weight:       Height:           Active LDAs/IV Access:   Lines, Drains & Airways       Active LDAs       Name Placement date Placement time Site Days    Peripheral IV 02/08/23 2007 Right Antecubital 02/08/23 2007  Antecubital  less than 1                    Labs (abnormal labs have a star):   Labs Reviewed   COMPREHENSIVE METABOLIC PANEL - Abnormal; Notable for the following components:       Result Value    Glucose 174 (*)     BUN 21 (*)     Sodium 135 (*)     Chloride 97 (*)     Alkaline Phosphatase 140 (*)     All other components within normal limits    Narrative:     GFR Normal >60  Chronic Kidney Disease <60  Kidney Failure <15     URINALYSIS W/ MICROSCOPIC IF INDICATED (NO CULTURE) - Abnormal; Notable for the following components:    Glucose, UA >=1000 mg/dL (3+) (*)     Ketones, UA 15 mg/dL (1+) (*)     Protein, UA 30 mg/dL (1+) (*)     All other components within normal limits   CBC WITH AUTO DIFFERENTIAL - Abnormal; Notable for the following components:    WBC 17.94 (*)     RDW 12.1 (*)     Neutrophil % 80.9 (*)     Lymphocyte % 11.6 (*)     Eosinophil % 0.2 (*)     Neutrophils, Absolute 14.51 (*)     Monocytes, Absolute 1.18 (*)     Immature Grans, Absolute 0.08 (*)     All other components within normal limits   LIPASE - Normal   D-DIMER, QUANTITATIVE - Normal    Narrative:     According to the assay 's published package insert, a normal (<0.50 MCGFEU/mL) D-dimer result in conjunction with a non-high clinical probability assessment, excludes deep vein thrombosis (DVT) and pulmonary embolism (PE) with high sensitivity.    D-dimer values increase with age and this can make VTE exclusion of an older population difficult. To address this, the [employer], based on best available evidence and recent guidelines,  "recommends that clinicians use age-adjusted D-dimer thresholds in patients greater than 50 years of age with: a) a low probability of PE who do not meet all Pulmonary Embolism Rule Out Criteria, or b) in those with intermediate probability of PE.   The formula for an age-adjusted D-dimer cut-off is \"age/100\".  For example, a 60 year old patient would have an age-adjusted cut-off of 0.60 MCGFEU/mL and an 80 year old 0.80 MCGFEU/mL.   RAINBOW DRAW    Narrative:     The following orders were created for panel order [place] Draw.  Procedure                               Abnormality         Status                     ---------                               -----------         ------                     Green Top (Gel)[[id number]]                                  Final result               Lavender Top[[id number]]                                     Final result               Gold Top - SST[[id number]]                                   Final result               Light Blue Top[[id number]]                                   Final result                 Please view results for these tests on the individual orders.   URINALYSIS, MICROSCOPIC ONLY   TROPONIN   CBC AND DIFFERENTIAL    Narrative:     The following orders were created for panel order CBC & Differential.  Procedure                               Abnormality         Status                     ---------                               -----------         ------                     CBC Auto Differential[[id number]]        Abnormal            Final result                 Please view results for these tests on the individual orders.   GREEN TOP   LAVENDER TOP   GOLD TOP - SST   LIGHT BLUE TOP       EKG:   ECG 12 Lead Other; Left chest pain   Preliminary Result   HEART RATE= 101  bpm   RR Interval= 594  ms   AK Interval= 187  ms   P Horizontal Axis= 6  deg   P Front Axis= 55  deg   QRSD Interval= 99  ms   QT Interval= 346  ms   QRS Axis= -37  deg   T Wave Axis= 59  deg   - " BORDERLINE ECG -   Sinus tachycardia   Left axis deviation   Abnormal R-wave progression, early transition   Electronically Signed By:    Date and Time of Study: 2023-02-08 20:50:38          Meds given in ED:   Medications   sodium chloride 0.9 % flush 10 mL (has no administration in time range)   sodium chloride 0.9 % flush 10 mL (has no administration in time range)   sodium chloride 0.9 % flush 10 mL (has no administration in time range)   sodium chloride 0.9 % infusion 40 mL (has no administration in time range)   acetaminophen (TYLENOL) tablet 650 mg (has no administration in time range)   ondansetron (ZOFRAN) tablet 4 mg (has no administration in time range)     Or   ondansetron (ZOFRAN) injection 4 mg (has no administration in time range)   ondansetron (ZOFRAN) injection 4 mg (4 mg Intravenous Given 2/8/23 2009)   sodium chloride 0.9 % bolus 500 mL (500 mL Intravenous New Bag 2/8/23 2007)   morphine injection 4 mg (4 mg Intravenous Given 2/8/23 2009)       Imaging results:  CT Abdomen Pelvis Without Contrast    Result Date: 2/8/2023    1. No urolithiasis or hydronephrosis. 2. No acute inflammatory process of bowel is identified, follow up as indications persist.  This report was finalized on 2/8/2023 7:50 PM by Dr. Estevan Man M.D.       Ambulatory status:   - ad halle    Social issues:   Social History     Socioeconomic History   • Marital status: Single   Tobacco Use   • Smoking status: Never   • Smokeless tobacco: Never   Vaping Use   • Vaping Use: Never used   Substance and Sexual Activity   • Alcohol use: No   • Drug use: No   • Sexual activity: Defer       NIH Stroke Scale:         Tere Kirkpatrick RN  02/08/23 21:13 EST     Call [phone] for report    Electronically signed by Tere Sterling RN at 02/08/23 3580       Vital Signs (last 2 days)     Date/Time Temp Temp src Pulse Resp BP Patient Position SpO2    02/09/23 1531 98.8 (37.1) Oral 99 18 134/87 Sitting 98    02/09/23 1050 98.5 (36.9) Oral  101 18 128/91 Sitting 97    02/09/23 0732 98.9 (37.2) Oral 87 16 125/84 Lying 98    02/08/23 2315 99.8 (37.7) Oral 105 18 102/85 Sitting 97    02/08/23 2100 98.4 (36.9) Oral 103 18 100/67 Lying 98    02/08/23 2001 -- -- 96 -- 109/71 -- 97    02/08/23 1903 -- -- 102 -- 120/72 -- 96    02/08/23 1858 -- -- 104 -- 127/93 -- 97    02/08/23 1743 -- -- -- -- 150/116 -- --    02/08/23 1742 98.8 (37.1) -- 119 20 -- -- 97        Oxygen Therapy (last 2 days)     Date/Time SpO2 Device (Oxygen Therapy) Flow (L/min) Oxygen Concentration (%) ETCO2 (mmHg)    02/09/23 1531 98 room air -- -- --    02/09/23 1050 97 room air -- -- --    02/09/23 0817 -- room air -- -- --    02/09/23 0732 98 room air -- -- --    02/08/23 2315 97 room air -- -- --    02/08/23 2148 -- room air -- -- --    02/08/23 2100 98 room air -- -- --    02/08/23 2001 97 -- -- -- --    02/08/23 1903 96 -- -- -- --    02/08/23 1858 97 -- -- -- --    02/08/23 1742 97 -- -- -- --        Intake & Output (last 2 days)     None        Lines, Drains & Airways     Active LDAs     Name Placement date Placement time Site Days    Peripheral IV 02/08/23 2007 Right Antecubital 02/08/23  2007  Antecubital  less than 1              Current Facility-Administered Medications   Medication Dose Route Frequency Provider Last Rate Last Admin   • acetaminophen (TYLENOL) tablet 650 mg  650 mg Oral Q4H PRN Alfreda Amado APRN       • aspirin EC tablet 81 mg  81 mg Oral Daily Alfreda Amado APRN   81 mg at 02/09/23 0906   • atorvastatin (LIPITOR) tablet 20 mg  20 mg Oral Daily Alfreda Amado APRN   20 mg at 02/09/23 0906   • dextrose (D50W) (25 g/50 mL) IV injection 25 g  25 g Intravenous Q15 Min PRN Alfreda Amado, MIGUEL       • dextrose (GLUTOSE) oral gel 15 g  15 g Oral Q15 Min PRN Alfreda Amado, MIGUEL       • empagliflozin (JARDIANCE) tablet 25 mg  25 mg Oral Daily Alfreda Amado APRN       • glucagon (GLUCAGEN) injection 1 mg  1 mg Intramuscular Q15 Min PRN Alfreda Amado, APRN        • lisinopril (PRINIVIL,ZESTRIL) tablet 20 mg  20 mg Oral Q24H Alfreda Amado APRN   20 mg at 02/09/23 0906    And   • hydroCHLOROthiazide (HYDRODIURIL) tablet 25 mg  25 mg Oral Q24H Alfreda Amado APRN   25 mg at 02/09/23 0906   • HYDROcodone-acetaminophen (NORCO) 5-325 MG per tablet 1 tablet  1 tablet Oral Q6H PRN Rudy Berger MD   1 tablet at 02/09/23 1449   • insulin glargine (LANTUS, SEMGLEE) injection 90 Units  90 Units Subcutaneous Daily Alfreda Amado APRN   90 Units at 02/09/23 0905   • insulin lispro (ADMELOG) injection 0-14 Units  0-14 Units Subcutaneous TID AC Alfreda Amado APRN       • methocarbamol (ROBAXIN) tablet 750 mg  750 mg Oral 4x Daily Alfreda Amado APRN   750 mg at 02/09/23 1248   • ondansetron (ZOFRAN) tablet 4 mg  4 mg Oral Q6H PRN Alfreda Amado APRN        Or   • ondansetron (ZOFRAN) injection 4 mg  4 mg Intravenous Q6H PRN Alfreda Amado APRN       • sodium chloride 0.9 % flush 10 mL  10 mL Intravenous PRN Dillon Castaneda MD       • sodium chloride 0.9 % flush 10 mL  10 mL Intravenous Q12H Alfreda Amado APRN   10 mL at 02/09/23 0906   • sodium chloride 0.9 % flush 10 mL  10 mL Intravenous PRN Alfreda Amado APRN       • sodium chloride 0.9 % infusion 40 mL  40 mL Intravenous PRN Alfreda Amado APRN             Lab Results (last 48 hours)     Procedure Component Value Units Date/Time    POC Glucose Once [[id number]]  (Abnormal) Collected: 02/09/23 1153    Specimen: Blood Updated: 02/09/23 1155     Glucose 246 mg/dL      Comment: Meter: [id number] : [id number] Adam De Dios LENO       C-reactive Protein [[id number]]  (Abnormal) Collected: 02/09/23 0840    Specimen: Blood Updated: 02/09/23 1117     C-Reactive Protein 10.32 mg/dL     Basic Metabolic Panel [[id number]]  (Abnormal) Collected: 02/09/23 0840    Specimen: Blood Updated: 02/09/23 1009     Glucose 154 mg/dL      BUN 17 mg/dL      Creatinine 0.74 mg/dL      Sodium 135 mmol/L      Potassium 4.1 mmol/L      Chloride 100  "mmol/L      CO2 23.6 mmol/L      Calcium 9.3 mg/dL      BUN/Creatinine Ratio 23.0     Anion Gap 11.4 mmol/L      eGFR 118.2 mL/min/1.73     Narrative:      GFR Normal >60  Chronic Kidney Disease <60  Kidney Failure <15      Sedimentation Rate [[id number]]  (Abnormal) Collected: 02/09/23 0840    Specimen: Blood Updated: 02/09/23 1002     Sed Rate 66 mm/hr     CBC (No Diff) [[id number]]  (Abnormal) Collected: 02/09/23 0840    Specimen: Blood Updated: 02/09/23 0854     WBC 16.21 10*3/mm3      RBC 5.47 10*6/mm3      Hemoglobin 16.0 g/dL      Hematocrit 47.5 %      MCV 86.8 fL      MCH 29.3 pg      MCHC 33.7 g/dL      RDW 12.4 %      RDW-SD 39.1 fl      MPV 9.5 fL      Platelets 286 10*3/mm3     POC Glucose Once [[id number]]  (Abnormal) Collected: 02/09/23 0726    Specimen: Blood Updated: 02/09/23 0728     Glucose 135 mg/dL      Comment: Meter: [id number] : [id number] Adam BURR       Procalcitonin [[id number]]  (Normal) Collected: 02/09/23 0108    Specimen: Blood Updated: 02/09/23 0526     Procalcitonin 0.06 ng/mL     Narrative:      As a Marker for Sepsis (Non-Neonates):    1. <0.5 ng/mL represents a low risk of severe sepsis and/or septic shock.  2. >2 ng/mL represents a high risk of severe sepsis and/or septic shock.    As a Marker for Lower Respiratory Tract Infections that require antibiotic therapy:    PCT on Admission    Antibiotic Therapy       6-12 Hrs later    >0.5                Strongly Recommended  >0.25 - <0.5        Recommended   0.1 - 0.25          Discouraged              Remeasure/reassess PCT  <0.1                Strongly Discouraged     Remeasure/reassess PCT    As 28 day mortality risk marker: \"Change in Procalcitonin Result\" (>80% or <=80%) if Day 0 (or Day 1) and Day 4 values are available. Refer to http://[email or web address]-pct-calculator.com    Change in PCT <=80%  A decrease of PCT levels below or equal to 80% defines a positive change in PCT test result representing a higher risk for 28-day " all-cause mortality of patients diagnosed with severe sepsis for septic shock.    Change in PCT >80%  A decrease of PCT levels of more than 80% defines a negative change in PCT result representing a lower risk for 28-day all-cause mortality of patients diagnosed with severe sepsis or septic shock.       Lactic Acid, Plasma [[id number]]  (Normal) Collected: 02/09/23 0108    Specimen: Blood Updated: 02/09/23 0213     Lactate 1.2 mmol/L     High Sensitivity Troponin T 2Hr [[id number]] Collected: 02/08/23 2230    Specimen: Blood from Arm, Right Updated: 02/09/23 0141     HS Troponin T 12 ng/L      Troponin T Delta --     Comment: Unable to calculate       Narrative:      High Sensitive Troponin T Reference Range:  <10.0 ng/L- Negative Female for AMI  <15.0 ng/L- Negative Male for AMI  >=10 - Abnormal Female indicating possible myocardial injury.  >=15 - Abnormal Male indicating possible myocardial injury.   Clinicians would have to utilize clinical acumen, EKG, Troponin, and serial changes to determine if it is an Acute Myocardial Infarction or myocardial injury due to an underlying chronic condition.         Blood Culture - Blood, Wrist, Left [[id number]] Collected: 02/09/23 0116    Specimen: Blood from Wrist, Left Updated: 02/09/23 0121    Blood Culture - Blood, Arm, Left [[id number]] Collected: 02/09/23 0108    Specimen: Blood from Arm, Left Updated: 02/09/23 0121    Hemoglobin A1c [[id number]]  (Abnormal) Collected: 02/08/23 1758    Specimen: Blood Updated: 02/08/23 2259     Hemoglobin A1C 7.90 %     Narrative:      Hemoglobin A1C Ranges:    Increased Risk for Diabetes  5.7% to 6.4%  Diabetes                     >= 6.5%  Diabetic Goal                < 7.0%    Troponin [[id number]]  (Normal) Collected: 02/08/23 1758    Specimen: Blood Updated: 02/08/23 2201     HS Troponin T 12 ng/L     Narrative:      High Sensitive Troponin T Reference Range:  <10.0 ng/L- Negative Female for AMI  <15.0 ng/L- Negative Male for AMI  >=10 -  "Abnormal Female indicating possible myocardial injury.  >=15 - Abnormal Male indicating possible myocardial injury.   Clinicians would have to utilize clinical acumen, EKG, Troponin, and serial changes to determine if it is an Acute Myocardial Infarction or myocardial injury due to an underlying chronic condition.         D-dimer, Quantitative [[id number]]  (Normal) Collected: 02/08/23 1758    Specimen: Blood Updated: 02/08/23 2056     D-Dimer, Quantitative 0.50 MCGFEU/mL     Narrative:      According to the assay 's published package insert, a normal (<0.50 MCGFEU/mL) D-dimer result in conjunction with a non-high clinical probability assessment, excludes deep vein thrombosis (DVT) and pulmonary embolism (PE) with high sensitivity.    D-dimer values increase with age and this can make VTE exclusion of an older population difficult. To address this, the [employer], based on best available evidence and recent guidelines, recommends that clinicians use age-adjusted D-dimer thresholds in patients greater than 50 years of age with: a) a low probability of PE who do not meet all Pulmonary Embolism Rule Out Criteria, or b) in those with intermediate probability of PE.   The formula for an age-adjusted D-dimer cut-off is \"age/100\".  For example, a 60 year old patient would have an age-adjusted cut-off of 0.60 MCGFEU/mL and an 80 year old 0.80 MCGFEU/mL.    [place] Draw [[id number]] Collected: 02/08/23 1758    Specimen: Blood Updated: 02/08/23 1900    Narrative:      The following orders were created for panel order [place] Draw.  Procedure                               Abnormality         Status                     ---------                               -----------         ------                     Green Top (Gel)[[id number]]                                  Final result               Lavender Top[[id number]]                                     Final result               Gold Top - " SST[[id number]]                                   Final result               Light Blue Top[[id number]]                                   Final result                 Please view results for these tests on the individual orders.    Green Top (Gel) [[id number]] Collected: 02/08/23 1758    Specimen: Blood Updated: 02/08/23 1900     Extra Tube Hold for add-ons.     Comment: Auto resulted.       Gold Top - SST [[id number]] Collected: 02/08/23 1758    Specimen: Blood Updated: 02/08/23 1900     Extra Tube Hold for add-ons.     Comment: Auto resulted.       Lavender Top [[id number]] Collected: 02/08/23 1758    Specimen: Blood Updated: 02/08/23 1900     Extra Tube hold for add-on     Comment: Auto resulted       Light Blue Top [[id number]] Collected: 02/08/23 1758    Specimen: Blood Updated: 02/08/23 1900     Extra Tube Hold for add-ons.     Comment: Auto resulted       Urinalysis With Microscopic If Indicated (No Culture) - Urine, Clean Catch [[id number]]  (Abnormal) Collected: 02/08/23 1758    Specimen: Urine, Clean Catch Updated: 02/08/23 1858     Color, UA Yellow     Appearance, UA Clear     pH, UA 5.5     Specific Gravity, UA >=1.030     Glucose, UA >=1000 mg/dL (3+)     Ketones, UA 15 mg/dL (1+)     Bilirubin, UA Negative     Blood, UA Negative     Protein, UA 30 mg/dL (1+)     Leuk Esterase, UA Negative     Nitrite, UA Negative     Urobilinogen, UA 1.0 E.U./dL    Urinalysis, Microscopic Only - Urine, Clean Catch [[id number]] Collected: 02/08/23 1758    Specimen: Urine, Clean Catch Updated: 02/08/23 1858     RBC, UA 0-2 /HPF      WBC, UA 0-2 /HPF      Bacteria, UA None Seen /HPF      Squamous Epithelial Cells, UA 0-2 /HPF      Hyaline Casts, UA 7-12 /LPF      Methodology Automated Microscopy    Lipase [[id number]]  (Normal) Collected: 02/08/23 1758    Specimen: Blood Updated: 02/08/23 1828     Lipase 16 U/L     Comprehensive Metabolic Panel [[id number]]  (Abnormal) Collected: 02/08/23 1758    Specimen: Blood Updated: 02/08/23  1828     Glucose 174 mg/dL      BUN 21 mg/dL      Creatinine 0.92 mg/dL      Sodium 135 mmol/L      Potassium 4.2 mmol/L      Chloride 97 mmol/L      CO2 25.7 mmol/L      Calcium 9.5 mg/dL      Total Protein 8.0 g/dL      Albumin 4.3 g/dL      ALT (SGPT) 23 U/L      AST (SGOT) 13 U/L      Alkaline Phosphatase 140 U/L      Total Bilirubin 0.4 mg/dL      Globulin 3.7 gm/dL      A/G Ratio 1.2 g/dL      BUN/Creatinine Ratio 22.8     Anion Gap 12.3 mmol/L      eGFR 108.5 mL/min/1.73     Narrative:      GFR Normal >60  Chronic Kidney Disease <60  Kidney Failure <15      CBC & Differential [[id number]]  (Abnormal) Collected: 02/08/23 1758    Specimen: Blood Updated: 02/08/23 1811    Narrative:      The following orders were created for panel order CBC & Differential.  Procedure                               Abnormality         Status                     ---------                               -----------         ------                     CBC Auto Differential[[id number]]        Abnormal            Final result                 Please view results for these tests on the individual orders.    CBC Auto Differential [[id number]]  (Abnormal) Collected: 02/08/23 1758    Specimen: Blood Updated: 02/08/23 1811     WBC 17.94 10*3/mm3      RBC 5.74 10*6/mm3      Hemoglobin 16.6 g/dL      Hematocrit 48.6 %      MCV 84.7 fL      MCH 28.9 pg      MCHC 34.2 g/dL      RDW 12.1 %      RDW-SD 37.3 fl      MPV 9.5 fL      Platelets 283 10*3/mm3      Neutrophil % 80.9 %      Lymphocyte % 11.6 %      Monocyte % 6.6 %      Eosinophil % 0.2 %      Basophil % 0.3 %      Immature Grans % 0.4 %      Neutrophils, Absolute 14.51 10*3/mm3      Lymphocytes, Absolute 2.08 10*3/mm3      Monocytes, Absolute 1.18 10*3/mm3      Eosinophils, Absolute 0.03 10*3/mm3      Basophils, Absolute 0.06 10*3/mm3      Immature Grans, Absolute 0.08 10*3/mm3      nRBC 0.0 /100 WBC           Imaging Results (Last 48 Hours)     Procedure Component Value Units Date/Time    CT  Angiogram Chest [[id number]] Collected: 02/09/23 1144     Updated: 02/09/23 1144    Narrative:      CT ANGIOGRAM CHEST WITH CONTRAST, PULMONARY EMBOLISM PROTOCOL     HISTORY: 37-year-old male with history of DVT. Leukocytosis. Rule out  pulmonary embolism.     TECHNIQUE: Spiral CT images were obtained from the lung apices to the  diaphragmatic domes following administration of intravenous contrast in  the angiographic phase. Coronal, sagittal and 3-D volume rendered  reformats were then obtained.     COMPARISON: 07/07/2019     FINDINGS: The exam is limited secondary to contrast bolus timing. No  convincing filling defects are identified to suggest pulmonary artery  thromboembolism. Evaluation of the segmental vessels is somewhat  limited. The aortic root is dilated. There is also dilatation of the  ascending thoracic aorta that measures up to 4.3 cm. The descending  thoracic aorta demonstrates normal caliber. No pleural or pericardial  effusion. Small mediastinal lymph nodes are present without pathological  enlargement. The central airways are patent. No suspicious pulmonary  nodules are seen. Mosaic perfusion is demonstrated within the lung  fields. Bilateral gynecomastia is present. No pathological axillary  lymphadenopathy.       Impression:      1. Limited exam due to contrast bolus timing. No convincing evidence of  pulmonary artery thromboembolism.  2. Dilated aortic root and ascending thoracic aorta without change from  2019.     CT ABDOMEN AND PELVIS WITH CONTRAST     HISTORY: Left flank pain and leukocytosis.     TECHNIQUE: Axial CT images of the abdomen and pelvis were obtained  following administration of intravenous contrast. The patient was not  given oral contrast Coronal and sagittal reformats were obtained.     COMPARISON: 02/08/2023     FINDINGS: The small and large bowel loops demonstrate normal caliber.  The appendix is normal. No appreciable bowel wall thickening.     The liver, gallbladder,  spleen and pancreas are normal. Bilateral  adrenal glands are normal. No renal calculi or hydronephrosis. Small  retroperitoneal lymph nodes are present without pathological  enlargement. The urinary bladder is moderately distended and normal.  Multiple subcentimeter retroperitoneal lymph nodes are present and  favored to be reactive. Majority of these are located within the left  para-aortic location at the level of the kidneys. No ascites or focal  fluid collection.     IMPRESSION: No acute abnormality within the abdomen and pelvis.  Subcentimeter left para-aortic lymph nodes are favored to be reactive.     Radiation dose reduction techniques were utilized, including automated  exposure control and exposure modulation based on body size.          CT Abdomen Pelvis With Contrast [[id number]] Collected: 02/09/23 1144     Updated: 02/09/23 1144    Narrative:      CT ANGIOGRAM CHEST WITH CONTRAST, PULMONARY EMBOLISM PROTOCOL     HISTORY: 37-year-old male with history of DVT. Leukocytosis. Rule out  pulmonary embolism.     TECHNIQUE: Spiral CT images were obtained from the lung apices to the  diaphragmatic domes following administration of intravenous contrast in  the angiographic phase. Coronal, sagittal and 3-D volume rendered  reformats were then obtained.     COMPARISON: 07/07/2019     FINDINGS: The exam is limited secondary to contrast bolus timing. No  convincing filling defects are identified to suggest pulmonary artery  thromboembolism. Evaluation of the segmental vessels is somewhat  limited. The aortic root is dilated. There is also dilatation of the  ascending thoracic aorta that measures up to 4.3 cm. The descending  thoracic aorta demonstrates normal caliber. No pleural or pericardial  effusion. Small mediastinal lymph nodes are present without pathological  enlargement. The central airways are patent. No suspicious pulmonary  nodules are seen. Mosaic perfusion is demonstrated within the lung  fields.  Bilateral gynecomastia is present. No pathological axillary  lymphadenopathy.       Impression:      1. Limited exam due to contrast bolus timing. No convincing evidence of  pulmonary artery thromboembolism.  2. Dilated aortic root and ascending thoracic aorta without change from  2019.     CT ABDOMEN AND PELVIS WITH CONTRAST     HISTORY: Left flank pain and leukocytosis.     TECHNIQUE: Axial CT images of the abdomen and pelvis were obtained  following administration of intravenous contrast. The patient was not  given oral contrast Coronal and sagittal reformats were obtained.     COMPARISON: 02/08/2023     FINDINGS: The small and large bowel loops demonstrate normal caliber.  The appendix is normal. No appreciable bowel wall thickening.     The liver, gallbladder, spleen and pancreas are normal. Bilateral  adrenal glands are normal. No renal calculi or hydronephrosis. Small  retroperitoneal lymph nodes are present without pathological  enlargement. The urinary bladder is moderately distended and normal.  Multiple subcentimeter retroperitoneal lymph nodes are present and  favored to be reactive. Majority of these are located within the left  para-aortic location at the level of the kidneys. No ascites or focal  fluid collection.     IMPRESSION: No acute abnormality within the abdomen and pelvis.  Subcentimeter left para-aortic lymph nodes are favored to be reactive.     Radiation dose reduction techniques were utilized, including automated  exposure control and exposure modulation based on body size.          CT Lumbar Spine Without Contrast [[id number]] Collected: 02/08/23 2320     Updated: 02/08/23 2342    Narrative:      CT OF THE THORACIC AND LUMBAR SPINE     HISTORY: Mid back pain     COMPARISON: None available.     TECHNIQUE: Axial CT imaging was obtained through the thoracic and lumbar  spine. Coronal and sagittal reformatted images were obtained.     FINDINGS:  THORACIC SPINE: No acute fracture or subluxation  of the thoracic spine  is seen. No aggressive osseous abnormalities are identified. There is  some mild intervertebral disc space narrowing noted within the  midthoracic spine. There are also appears to be some mild DISH-like  change within the midthoracic spine. No significant canal stenosis or  neural foraminal narrowing is seen at any level. No acute abnormalities  are identified within the thorax.     Lumbar Spine: No acute fracture or subluxation of the lumbar spine is  seen. Lumbar vertebral body alignment appears within normal limits.  Intervertebral disc spaces are relatively well-maintained. There is some  mild asymmetric degenerative changes involving the right SI joint.     T12-L1: There is no significant canal stenosis or neural foraminal  narrowing.  L1-L2: There is no significant canal stenosis or neural foraminal  narrowing.  L2-L3: There is no significant canal stenosis or neural foraminal  narrowing.  L3-L4: There is some mild disc bulge, without significant canal  stenosis. There may be some minimal bilateral neural foraminal  narrowing.  L4-L5: There is diffuse disc bulge. There is moderate canal narrowing,  secondary to both disc disease and hypertrophy of the ligamentum flavum.  Patient also appears to have bilateral neural foraminal narrowing.  L5-S1: There is disc bulge, but there is no significant canal stenosis.  The patient does appear to have neural foraminal narrowing on the right.  Please see the earlier report for findings within the abdomen and  pelvis.       Impression:      As above. Degenerative changes, most significant at L4-L5 and L5-S1.     Radiation dose reduction techniques were utilized, including automated  exposure control and exposure modulation based on body size.     This report was finalized on 2/8/2023 11:39 PM by Dr. Allegra Corrigan M.D.       CT Thoracic Spine Without Contrast [[id number]] Collected: 02/08/23 2320     Updated: 02/08/23 2342    Narrative:      CT OF  THE THORACIC AND LUMBAR SPINE     HISTORY: Mid back pain     COMPARISON: None available.     TECHNIQUE: Axial CT imaging was obtained through the thoracic and lumbar  spine. Coronal and sagittal reformatted images were obtained.     FINDINGS:  THORACIC SPINE: No acute fracture or subluxation of the thoracic spine  is seen. No aggressive osseous abnormalities are identified. There is  some mild intervertebral disc space narrowing noted within the  midthoracic spine. There are also appears to be some mild DISH-like  change within the midthoracic spine. No significant canal stenosis or  neural foraminal narrowing is seen at any level. No acute abnormalities  are identified within the thorax.     Lumbar Spine: No acute fracture or subluxation of the lumbar spine is  seen. Lumbar vertebral body alignment appears within normal limits.  Intervertebral disc spaces are relatively well-maintained. There is some  mild asymmetric degenerative changes involving the right SI joint.     T12-L1: There is no significant canal stenosis or neural foraminal  narrowing.  L1-L2: There is no significant canal stenosis or neural foraminal  narrowing.  L2-L3: There is no significant canal stenosis or neural foraminal  narrowing.  L3-L4: There is some mild disc bulge, without significant canal  stenosis. There may be some minimal bilateral neural foraminal  narrowing.  L4-L5: There is diffuse disc bulge. There is moderate canal narrowing,  secondary to both disc disease and hypertrophy of the ligamentum flavum.  Patient also appears to have bilateral neural foraminal narrowing.  L5-S1: There is disc bulge, but there is no significant canal stenosis.  The patient does appear to have neural foraminal narrowing on the right.  Please see the earlier report for findings within the abdomen and  pelvis.       Impression:      As above. Degenerative changes, most significant at L4-L5 and L5-S1.     Radiation dose reduction techniques were utilized,  including automated  exposure control and exposure modulation based on body size.     This report was finalized on 2/8/2023 11:39 PM by Dr. Allegra Corrigan M.D.       CT Abdomen Pelvis Without Contrast [[id number]] Collected: 02/08/23 1945     Updated: 02/08/23 1953    Narrative:      CT ABDOMEN PELVIS WO CONTRAST-     INDICATIONS: Left flank pain     TECHNIQUE: Radiation dose reduction techniques were utilized, including  automated exposure control and exposure modulation based on body size.  Unenhanced ABDOMEN AND PELVIS CT     COMPARISON: 11/30/2019     FINDINGS:      Unremarkable unenhanced appearance of the liver, gallbladder, spleen,  adrenal glands, pancreas, kidneys, bladder.     No bowel obstruction or abnormal bowel thickening is identified. The  appendix does not appear inflamed.     No free intraperitoneal gas or free fluid. Small umbilical hernia of fat  is present.     Scattered small mesenteric and para-aortic lymph nodes are seen that are  not significant by size criteria.     Abdominal aorta is not aneurysmal.     The lung bases are clear.     Degenerative changes are seen in the spine. No acute fracture is  identified.             Impression:            1. No urolithiasis or hydronephrosis.  2. No acute inflammatory process of bowel is identified, follow up as  indications persist.     This report was finalized on 2/8/2023 7:50 PM by Dr. Estevan Man M.D.           ECG/EMG Results (last 48 hours)     Procedure Component Value Units Date/Time    ECG 12 Lead Other; Left chest pain [[id number]] Collected: 02/08/23 2050     Updated: 02/08/23 2140     QT Interval 346 ms     Narrative:      HEART RATE= 101  bpm  RR Interval= 594  ms  CO Interval= 187  ms  P Horizontal Axis= 6  deg  P Front Axis= 55  deg  QRSD Interval= 99  ms  QT Interval= 346  ms  QRS Axis= -37  deg  T Wave Axis= 59  deg  - BORDERLINE ECG -  Sinus tachycardia  Left axis deviation  Abnormal R-wave progression, early  transition  No Prior Tracing for Comparison  Electronically Signed By: Amado White ([facility]) 08-Feb-2023 21:40:14  Date and Time of Study: 2023-02-08 20:50:38          Orders (last 48 hrs)      Start     Ordered    02/09/23 1413  Transfer Patient  Once         02/09/23 1412    02/09/23 1412  Inpatient Admission  Once         02/09/23 1412    02/09/23 1408  MRI Thoracic Spine With & Without Contrast  1 Time Imaging         02/09/23 1409    02/09/23 1408  Inpatient Neurosurgery Consult  Once        Specialty:  Neurosurgery  Provider:  Maru Reed APRN    02/09/23 1409    02/09/23 1407  MRI Lumbar Spine With & Without Contrast  1 Time Imaging         02/09/23 1409    02/09/23 1344  Inpatient Internal Medicine Consult  Once        Specialty:  Internal Medicine  Provider:  Sujit Haro MD    02/09/23 1344    02/09/23 1340  Inpatient Infectious Diseases Consult  Once        Specialty:  Infectious Diseases  Provider:  Jasiel Brunner MD    02/09/23 1341    02/09/23 1156  POC Glucose Once  PROCEDURE ONCE         02/09/23 1153    02/09/23 1115  iopamidol (ISOVUE-370) 76 % injection 100 mL  Once in Imaging         02/09/23 1023    02/09/23 0920  Sedimentation Rate  Once         02/09/23 0919    02/09/23 0920  C-reactive Protein  Once         02/09/23 0919    02/09/23 0916  CT Abdomen Pelvis With Contrast  1 Time Imaging         02/09/23 0916    02/09/23 0915  CT Angiogram Chest  1 Time Imaging         02/09/23 0916    02/09/23 0900  aspirin EC tablet 81 mg  Daily         02/08/23 2153    02/09/23 0900  atorvastatin (LIPITOR) tablet 20 mg  Daily         02/08/23 2153    02/09/23 0900  empagliflozin (JARDIANCE) tablet 25 mg  Daily         02/08/23 2153 02/09/23 0900  lisinopril (PRINIVIL,ZESTRIL) tablet 20 mg  Every 24 Hours Scheduled        See Hyperspace for full Linked Orders Report.    02/08/23 2153 02/09/23 0900  hydroCHLOROthiazide (HYDRODIURIL) tablet 25 mg  Every 24 Hours Scheduled         See Hyperspace for full Linked Orders Report.    02/08/23 2153 02/09/23 0900  insulin glargine (LANTUS, SEMGLEE) injection 90 Units  Daily         02/08/23 2153 02/09/23 0800  Oral Care  2 Times Daily       02/08/23 2111 02/09/23 0730  insulin lispro (ADMELOG) injection 0-14 Units  3 Times Daily Before Meals         02/08/23 2153 02/09/23 0728  POC Glucose Once  PROCEDURE ONCE         02/09/23 0726    02/09/23 0700  POC Glucose TID AC  3 Times Daily Before Meals       02/08/23 2153 02/09/23 0600  Basic Metabolic Panel  Morning Draw,   Status:  Canceled         02/08/23 2111 02/09/23 0600  CBC (No Diff)  Morning Draw,   Status:  Canceled         02/08/23 2111 02/09/23 0600  Basic Metabolic Panel  Morning Draw         02/09/23 0542    02/09/23 0600  CBC (No Diff)  Morning Draw         02/09/23 0542    02/09/23 0030  methocarbamol (ROBAXIN) tablet 750 mg  4 Times Daily,   Status:  Discontinued         02/08/23 2237 02/09/23 0030  methocarbamol (ROBAXIN) tablet 750 mg  4 Times Daily         02/08/23 2334    02/09/23 0005  Blood Culture - Blood, Wrist, Left  Once        See Hyperspace for full Linked Orders Report.    02/09/23 0004    02/09/23 0005  Blood Culture - Blood, Arm, Left  Once        Comments: From a different site than #1.     See Hyperspace for full Linked Orders Report.    02/09/23 0004    02/09/23 0005  Lactic Acid, Plasma  Once         02/09/23 0004    02/09/23 0005  Procalcitonin  Once         02/09/23 0004    02/09/23 0000  Vital Signs  Every 4 Hours       02/08/23 2111 02/08/23 2247  Procalcitonin  Once,   Status:  Canceled         02/08/23 2247 02/08/23 2247  Lactic Acid, Plasma  Once,   Status:  Canceled         02/08/23 2247 02/08/23 2247  Blood Culture - Blood,  Once,   Status:  Canceled        See Hyperspace for full Linked Orders Report.    02/08/23 2247 02/08/23 2247  Blood Culture - Blood,  Once,   Status:  Canceled        Comments: From a different site  than #1.     [facility].    02/08/23 2247 02/08/23 2239  HYDROcodone-acetaminophen (NORCO) 5-325 MG per tablet 1 tablet  Every 6 Hours PRN         02/08/23 2239 02/08/23 2239  Diet: Cardiac Diets, Diabetic Diets; Healthy Heart (2-3 Na+); Consistent Carbohydrate; Texture: Regular Texture (IDDSI 7); Fluid Consistency: Thin (IDDSI 0)  Diet Effective Now         02/08/23 2238 02/08/23 2233  CT Thoracic Spine Without Contrast  1 Time Imaging         02/08/23 2232 02/08/23 2232  CT Lumbar Spine Without Contrast  1 Time Imaging         02/08/23 2232 02/08/23 2218  High Sensitivity Troponin T 2Hr  STAT         02/08/23 2217 02/08/23 2154  Do NOT Hold Basal or Correction Scale Insulin When Patient is NPO, Hold Scheduled Mealtime (Bolus) Insulin if NPO  Continuous         02/08/23 2153 02/08/23 2154  Hemoglobin A1c  Once         02/08/23 2153 02/08/23 2153  dextrose (GLUTOSE) oral gel 15 g  Every 15 Minutes PRN         02/08/23 2153 02/08/23 2153  dextrose (D50W) (25 g/50 mL) IV injection 25 g  Every 15 Minutes PRN         02/08/23 2153    02/08/23 2153  glucagon (GLUCAGEN) injection 1 mg  Every 15 Minutes PRN         02/08/23 2153 02/08/23 2113  sodium chloride 0.9 % flush 10 mL  Every 12 Hours Scheduled         02/08/23 2111 02/08/23 2111  Code Status and Medical Interventions:  Continuous         02/08/23 2111 02/08/23 2111  Intake & Output  Every Shift       02/08/23 2111 02/08/23 2111  Weigh Patient  Once         02/08/23 2111 02/08/23 2111  Oxygen Therapy- Nasal Cannula; Titrate for SPO2: 90% - 95%  Continuous         02/08/23 2111 02/08/23 2111  Insert Peripheral IV  Once         02/08/23 2111 02/08/23 2111  Saline Lock & Maintain IV Access  Continuous         02/08/23 2111 02/08/23 2111  Place Sequential Compression Device  Once         02/08/23 2111 02/08/23 2111  Maintain Sequential Compression Device  Continuous         02/08/23  2111 02/08/23 2111  NPO Diet NPO Type: Strict NPO  Diet Effective Now,   Status:  Canceled         02/08/23 2111 02/08/23 2110  sodium chloride 0.9 % flush 10 mL  As Needed         02/08/23 2111 02/08/23 2110  sodium chloride 0.9 % infusion 40 mL  As Needed         02/08/23 2111 02/08/23 2110  acetaminophen (TYLENOL) tablet 650 mg  Every 4 Hours PRN         02/08/23 2111 02/08/23 2110  ondansetron (ZOFRAN) tablet 4 mg  Every 6 Hours PRN        See Hyperspace for full Linked Orders Report.    02/08/23 2111 02/08/23 2110  ondansetron (ZOFRAN) injection 4 mg  Every 6 Hours PRN        See Hyperspace for full Linked Orders Report.    02/08/23 2111 02/08/23 2110  Cardiac Monitoring  Continuous        Comments: Follow Standing Orders As Outlined in Process Instructions (Open Order Report to View Full Instructions)    02/08/23 2109 02/08/23 2109  Initiate ED Observation Status  Once         02/08/23 2109 02/08/23 2043  D-dimer, Quantitative  Once         02/08/23 2043 02/08/23 2043  Troponin  Once         02/08/23 2043 02/08/23 2043  ECG 12 Lead Other; Left chest pain  Once         02/08/23 2043 02/08/23 1958  High Sensitivity Troponin T 2Hr  PROCEDURE ONCE,   Status:  Canceled         02/08/23 2201 02/08/23 1918  ondansetron (ZOFRAN) injection 4 mg  Once         02/08/23 1916 02/08/23 1918  sodium chloride 0.9 % bolus 500 mL  Once         02/08/23 1916 02/08/23 1918  morphine injection 4 mg  Once         02/08/23 1916 02/08/23 1916  CT Abdomen Pelvis Without Contrast  1 Time Imaging        Comments: NON-CONTRASTED STUDY      02/08/23 1916 02/08/23 1855  Urinalysis, Microscopic Only - Urine, Clean Catch  Once         02/08/23 1854 02/08/23 1745  NPO Diet NPO Type: Strict NPO  Diet Effective Now,   Status:  Canceled         02/08/23 1744 02/08/23 1745  Undress & Gown  Once         02/08/23 1744    02/08/23 1745  Insert Peripheral IV  Once         02/08/23 1744     02/08/23 1745  Wolf Draw  Once         02/08/23 1744    02/08/23 1745  CBC & Differential  Once         02/08/23 1744    02/08/23 1745  Comprehensive Metabolic Panel  Once         02/08/23 1744    02/08/23 1745  Lipase  Once         02/08/23 1744    02/08/23 1745  Urinalysis With Microscopic If Indicated (No Culture) - Urine, Clean Catch  Once         02/08/23 1744    02/08/23 1745  Green Top (Gel)  PROCEDURE ONCE         02/08/23 1744    02/08/23 1745  Lavender Top  PROCEDURE ONCE         02/08/23 1744    02/08/23 1745  Gold Top - SST  PROCEDURE ONCE         02/08/23 1744    02/08/23 1745  Light Blue Top  PROCEDURE ONCE         02/08/23 1744    02/08/23 1745  CBC Auto Differential  PROCEDURE ONCE         02/08/23 1744    02/08/23 1744  sodium chloride 0.9 % flush 10 mL  As Needed         02/08/23 1744    Unscheduled  Up With Assistance  As Needed       02/08/23 2111    Unscheduled  Follow Hypoglycemia Standing Orders For Blood Glucose <70 & Notify Provider of Treatment  As Needed      Comments: Follow Hypoglycemia Orders As Outlined in Process Instructions (Open Order Report to View Full Instructions)  Notify Provider Any Time Hypoglycemia Treatment is Administered    02/08/23 2153    --  NON FORMULARY  Weekly         02/09/23 1252                   Physician Progress Notes (last 48 hours)      Suzan Ramirez PA-C at 02/09/23 1345          ED OBSERVATION PROGRESS/DISCHARGE SUMMARY    Date of Admission: 2/8/2023   LOS: 0 days   PCP: Chuy Hays APRN      Subjective    No acute events overnight.  Patient states he is still having left back pain that radiates into the left side into the left lower stomach and down into his groin.  He states the pain is better while he is at rest however worsens with movement.  Admits to subjective fevers and chills.  Denies urinary and fecal incontinence.  Denies numbness and tingling.  Denies focal weakness.    Hospital Outcome:   37-year-old male admitted to the  observation unit for further evaluation of left lower back pain and leukocytosis.  So far patient's work-up is unremarkable.  T scan of the thoracic and lumbar spine show degenerative changes.  CT scan of the abdomen and pelvis unremarkable.  Patient's leukocytosis persistent at 16,000 this morning.  Procalcitonin and lactic acid are normal.  CTA of the chest and CT of the abdomen pelvis with IV contrast shows no acute findings.  ESR and CRP elevated at 66 and 10.32 respectively.  Urinalysis with no evidence of infection  exam normal, low concern for Donna's gangrene, however concern for potential infection however unknown etiology at this time.  Infectious disease has been consulted.  MRI of the thoracic and lumbar spine with and without ordered.  Neurosurgery consulted.  I spoke with Dr. Hoffman with [facility] who agrees to accept the patient under his service.  I discussed the findings and plan with the patient who endorses understanding is in agreement.    ROS:  General: + Subjective fevers  Respiratory: no cough, dyspnea  Cardiovascular: no chest pain, palpitations  Abdomen: No abdominal pain, nausea, vomiting, or diarrhea  Neurologic: No focal weakness    Objective   Physical Exam:  I have reviewed the vital signs.  Temp:  [98.4 °F (36.9 °C)-99.8 °F (37.7 °C)] 98.5 °F (36.9 °C)  Heart Rate:  [] 101  Resp:  [16-20] 18  BP: (100-150)/() 128/91  General Appearance:  37-year-old male, well-nourished, no acute distress on room air  Head:    Normocephalic, atraumatic  Eyes:    Sclerae anicteric  Neck:   Supple, no mass  Lungs: Clear to auscultation bilaterally, respirations unlabored  Heart: Regular rate and rhythm, S1 and S2 normal, no murmur, rub or gallop  Abdomen:  Soft, left lower quadrant tenderness to palpation without guarding, bowel sounds active, nondistended  MSK: Patient has some paraspinal tenderness of the lumbar spine, pain is reproducible with movement, left flank tenderness to  palpation  Extremities: No clubbing, cyanosis, or edema to lower extremities  Pulses:  2+ and symmetric in distal lower extremities  Skin: No rashes   Neurologic: Oriented x3, Normal strength to extremities    Results Review:    I have reviewed the labs, radiology results and diagnostic studies.    Results from last 7 days   Lab Units 02/09/23  0840   WBC 10*3/mm3 16.21*   HEMOGLOBIN g/dL 16.0   HEMATOCRIT % 47.5   PLATELETS 10*3/mm3 286     Results from last 7 days   Lab Units 02/09/23  0840 02/08/23  1758   SODIUM mmol/L 135* 135*   POTASSIUM mmol/L 4.1 4.2   CHLORIDE mmol/L 100 97*   CO2 mmol/L 23.6 25.7   BUN mg/dL 17 21*   CREATININE mg/dL 0.74* 0.92   CALCIUM mg/dL 9.3 9.5   BILIRUBIN mg/dL  --  0.4   ALK PHOS U/L  --  140*   ALT (SGPT) U/L  --  23   AST (SGOT) U/L  --  13   GLUCOSE mg/dL 154* 174*     Imaging Results (Last 24 Hours)     Procedure Component Value Units Date/Time    CT Angiogram Chest [[id number]] Collected: 02/09/23 1144     Updated: 02/09/23 1144    Narrative:      CT ANGIOGRAM CHEST WITH CONTRAST, PULMONARY EMBOLISM PROTOCOL     HISTORY: 37-year-old male with history of DVT. Leukocytosis. Rule out  pulmonary embolism.     TECHNIQUE: Spiral CT images were obtained from the lung apices to the  diaphragmatic domes following administration of intravenous contrast in  the angiographic phase. Coronal, sagittal and 3-D volume rendered  reformats were then obtained.     COMPARISON: 07/07/2019     FINDINGS: The exam is limited secondary to contrast bolus timing. No  convincing filling defects are identified to suggest pulmonary artery  thromboembolism. Evaluation of the segmental vessels is somewhat  limited. The aortic root is dilated. There is also dilatation of the  ascending thoracic aorta that measures up to 4.3 cm. The descending  thoracic aorta demonstrates normal caliber. No pleural or pericardial  effusion. Small mediastinal lymph nodes are present without pathological  enlargement. The  central airways are patent. No suspicious pulmonary  nodules are seen. Mosaic perfusion is demonstrated within the lung  fields. Bilateral gynecomastia is present. No pathological axillary  lymphadenopathy.       Impression:      1. Limited exam due to contrast bolus timing. No convincing evidence of  pulmonary artery thromboembolism.  2. Dilated aortic root and ascending thoracic aorta without change from  2019.     CT ABDOMEN AND PELVIS WITH CONTRAST     HISTORY: Left flank pain and leukocytosis.     TECHNIQUE: Axial CT images of the abdomen and pelvis were obtained  following administration of intravenous contrast. The patient was not  given oral contrast Coronal and sagittal reformats were obtained.     COMPARISON: 02/08/2023     FINDINGS: The small and large bowel loops demonstrate normal caliber.  The appendix is normal. No appreciable bowel wall thickening.     The liver, gallbladder, spleen and pancreas are normal. Bilateral  adrenal glands are normal. No renal calculi or hydronephrosis. Small  retroperitoneal lymph nodes are present without pathological  enlargement. The urinary bladder is moderately distended and normal.  Multiple subcentimeter retroperitoneal lymph nodes are present and  favored to be reactive. Majority of these are located within the left  para-aortic location at the level of the kidneys. No ascites or focal  fluid collection.     IMPRESSION: No acute abnormality within the abdomen and pelvis.  Subcentimeter left para-aortic lymph nodes are favored to be reactive.     Radiation dose reduction techniques were utilized, including automated  exposure control and exposure modulation based on body size.          CT Abdomen Pelvis With Contrast [[id number]] Collected: 02/09/23 1144     Updated: 02/09/23 1144    Narrative:      CT ANGIOGRAM CHEST WITH CONTRAST, PULMONARY EMBOLISM PROTOCOL     HISTORY: 37-year-old male with history of DVT. Leukocytosis. Rule out  pulmonary embolism.      TECHNIQUE: Spiral CT images were obtained from the lung apices to the  diaphragmatic domes following administration of intravenous contrast in  the angiographic phase. Coronal, sagittal and 3-D volume rendered  reformats were then obtained.     COMPARISON: 07/07/2019     FINDINGS: The exam is limited secondary to contrast bolus timing. No  convincing filling defects are identified to suggest pulmonary artery  thromboembolism. Evaluation of the segmental vessels is somewhat  limited. The aortic root is dilated. There is also dilatation of the  ascending thoracic aorta that measures up to 4.3 cm. The descending  thoracic aorta demonstrates normal caliber. No pleural or pericardial  effusion. Small mediastinal lymph nodes are present without pathological  enlargement. The central airways are patent. No suspicious pulmonary  nodules are seen. Mosaic perfusion is demonstrated within the lung  fields. Bilateral gynecomastia is present. No pathological axillary  lymphadenopathy.       Impression:      1. Limited exam due to contrast bolus timing. No convincing evidence of  pulmonary artery thromboembolism.  2. Dilated aortic root and ascending thoracic aorta without change from  2019.     CT ABDOMEN AND PELVIS WITH CONTRAST     HISTORY: Left flank pain and leukocytosis.     TECHNIQUE: Axial CT images of the abdomen and pelvis were obtained  following administration of intravenous contrast. The patient was not  given oral contrast Coronal and sagittal reformats were obtained.     COMPARISON: 02/08/2023     FINDINGS: The small and large bowel loops demonstrate normal caliber.  The appendix is normal. No appreciable bowel wall thickening.     The liver, gallbladder, spleen and pancreas are normal. Bilateral  adrenal glands are normal. No renal calculi or hydronephrosis. Small  retroperitoneal lymph nodes are present without pathological  enlargement. The urinary bladder is moderately distended and normal.  Multiple  subcentimeter retroperitoneal lymph nodes are present and  favored to be reactive. Majority of these are located within the left  para-aortic location at the level of the kidneys. No ascites or focal  fluid collection.     IMPRESSION: No acute abnormality within the abdomen and pelvis.  Subcentimeter left para-aortic lymph nodes are favored to be reactive.     Radiation dose reduction techniques were utilized, including automated  exposure control and exposure modulation based on body size.          CT Lumbar Spine Without Contrast [[id number]] Collected: 02/08/23 2320     Updated: 02/08/23 2342    Narrative:      CT OF THE THORACIC AND LUMBAR SPINE     HISTORY: Mid back pain     COMPARISON: None available.     TECHNIQUE: Axial CT imaging was obtained through the thoracic and lumbar  spine. Coronal and sagittal reformatted images were obtained.     FINDINGS:  THORACIC SPINE: No acute fracture or subluxation of the thoracic spine  is seen. No aggressive osseous abnormalities are identified. There is  some mild intervertebral disc space narrowing noted within the  midthoracic spine. There are also appears to be some mild DISH-like  change within the midthoracic spine. No significant canal stenosis or  neural foraminal narrowing is seen at any level. No acute abnormalities  are identified within the thorax.     Lumbar Spine: No acute fracture or subluxation of the lumbar spine is  seen. Lumbar vertebral body alignment appears within normal limits.  Intervertebral disc spaces are relatively well-maintained. There is some  mild asymmetric degenerative changes involving the right SI joint.     T12-L1: There is no significant canal stenosis or neural foraminal  narrowing.  L1-L2: There is no significant canal stenosis or neural foraminal  narrowing.  L2-L3: There is no significant canal stenosis or neural foraminal  narrowing.  L3-L4: There is some mild disc bulge, without significant canal  stenosis. There may be some  minimal bilateral neural foraminal  narrowing.  L4-L5: There is diffuse disc bulge. There is moderate canal narrowing,  secondary to both disc disease and hypertrophy of the ligamentum flavum.  Patient also appears to have bilateral neural foraminal narrowing.  L5-S1: There is disc bulge, but there is no significant canal stenosis.  The patient does appear to have neural foraminal narrowing on the right.  Please see the earlier report for findings within the abdomen and  pelvis.       Impression:      As above. Degenerative changes, most significant at L4-L5 and L5-S1.     Radiation dose reduction techniques were utilized, including automated  exposure control and exposure modulation based on body size.     This report was finalized on 2/8/2023 11:39 PM by Dr. Allegra Corrigan M.D.       CT Thoracic Spine Without Contrast [[id number]] Collected: 02/08/23 2320     Updated: 02/08/23 2342    Narrative:      CT OF THE THORACIC AND LUMBAR SPINE     HISTORY: Mid back pain     COMPARISON: None available.     TECHNIQUE: Axial CT imaging was obtained through the thoracic and lumbar  spine. Coronal and sagittal reformatted images were obtained.     FINDINGS:  THORACIC SPINE: No acute fracture or subluxation of the thoracic spine  is seen. No aggressive osseous abnormalities are identified. There is  some mild intervertebral disc space narrowing noted within the  midthoracic spine. There are also appears to be some mild DISH-like  change within the midthoracic spine. No significant canal stenosis or  neural foraminal narrowing is seen at any level. No acute abnormalities  are identified within the thorax.     Lumbar Spine: No acute fracture or subluxation of the lumbar spine is  seen. Lumbar vertebral body alignment appears within normal limits.  Intervertebral disc spaces are relatively well-maintained. There is some  mild asymmetric degenerative changes involving the right SI joint.     T12-L1: There is no significant canal  stenosis or neural foraminal  narrowing.  L1-L2: There is no significant canal stenosis or neural foraminal  narrowing.  L2-L3: There is no significant canal stenosis or neural foraminal  narrowing.  L3-L4: There is some mild disc bulge, without significant canal  stenosis. There may be some minimal bilateral neural foraminal  narrowing.  L4-L5: There is diffuse disc bulge. There is moderate canal narrowing,  secondary to both disc disease and hypertrophy of the ligamentum flavum.  Patient also appears to have bilateral neural foraminal narrowing.  L5-S1: There is disc bulge, but there is no significant canal stenosis.  The patient does appear to have neural foraminal narrowing on the right.  Please see the earlier report for findings within the abdomen and  pelvis.       Impression:      As above. Degenerative changes, most significant at L4-L5 and L5-S1.     Radiation dose reduction techniques were utilized, including automated  exposure control and exposure modulation based on body size.     This report was finalized on 2/8/2023 11:39 PM by Dr. Allegra Corrigan M.D.       CT Abdomen Pelvis Without Contrast [[id number]] Collected: 02/08/23 1945     Updated: 02/08/23 1953    Narrative:      CT ABDOMEN PELVIS WO CONTRAST-     INDICATIONS: Left flank pain     TECHNIQUE: Radiation dose reduction techniques were utilized, including  automated exposure control and exposure modulation based on body size.  Unenhanced ABDOMEN AND PELVIS CT     COMPARISON: 11/30/2019     FINDINGS:      Unremarkable unenhanced appearance of the liver, gallbladder, spleen,  adrenal glands, pancreas, kidneys, bladder.     No bowel obstruction or abnormal bowel thickening is identified. The  appendix does not appear inflamed.     No free intraperitoneal gas or free fluid. Small umbilical hernia of fat  is present.     Scattered small mesenteric and para-aortic lymph nodes are seen that are  not significant by size criteria.     Abdominal aorta  is not aneurysmal.     The lung bases are clear.     Degenerative changes are seen in the spine. No acute fracture is  identified.             Impression:            1. No urolithiasis or hydronephrosis.  2. No acute inflammatory process of bowel is identified, follow up as  indications persist.     This report was finalized on 2/8/2023 7:50 PM by Dr. Estevan Man M.D.             I have reviewed the medications.  ---------------------------------------------------------------------------------------------  Assessment & Plan   Assessment/Problem List    Abdominal pain      Plan:    Left flank pain/back pain/chest wall pain  Leukocytosis  -CT thoracic spine-no acute fracture or subluxation of the thoracic spine is seen  -CT lumbar spine-no acute fracture or subluxation of the lumbar spine is seen.          Degenerative changes, most significantly at L4-L5 and L5-S1.  -Robaxin 4 times daily  -Analgesics as needed  -WBC16.2, CRP 10.32, ESR 66  -Lactic and Pro-Antolin normal  -Blood cultures x2-pending  -Urinalysis without evidence of infection  -CTA of the chest negative for PE, no acute findings  -CTA of the abdomen and pelvis with contrast shows no acute abnormality  - exam normal, low suspicion for Donna's gangrene  -Infectious disease consulted  -Consider MRI of the thoracic and lumbar spine with and without to assess for discitis     Diabetes  -A1c 7.9  -Continue home basal insulin and Jardiance  -Sliding scale insulin and Accu-Cheks with meals      Disposition: Admit to medicine, Dr. Stroud with [facility]    This note will serve as a transfer summary.      60 minutes have been spent by [facility] providers in the care of this patient while under observation status.      I wore an face mask, eye protection, and gloves during this patient encounter. Patient also wearing a surgical mask. Hand hygeine performed before and after seeing the patient.      Suzan Ramirez PA-C  02/09/23 13:46 EST              Electronically signed by Suzan Ramirez PA-C at 02/09/23 1416     Arnol Bennett MD at 02/09/23 0985        MD ATTESTATION NOTE    The CHELSEA and I have discussed this patient's history, physical exam, and treatment plan.  I have reviewed the documentation and personally had a face to face interaction with the patient. I affirm the documentation and agree with the treatment and plan.  The attached note describes my personal findings.      I provided a substantive portion of the care of the patient.  I personally performed the physical exam in its entirety, and below are my findings.  For this patient encounter, the patient wore surgical mask, I wore full protective PPE including N95 and eye protection    Brief HPI: This is a pleasant [age]-year-old male with history of morbid obesity, hypertension diabetes, dyslipidemia who presents with left-sided back pain.  This pain did start soon after he pulled a motor out of the vehicle last night.  When he was done working he noticed that he was having some pain in his back.  Does have a history of back problems and describes and states that his back goes out at times.  Pain is worse with movement.  He also reported subjective fevers and chills that night.  The pain again is on the whole left side left flank with radiation to his left upper quadrant left lower aspect of his chest down the left lower quadrant of his abdomen to his left testicle.  This pain again is worse with movement such as getting up or rolling over.  He denies any nausea vomiting or diarrhea.  Denies any urinary or fecal incontinence or retention.  Denies any saddle anesthesia.  Denies any new weakness to the legs.  There is no radiation to legs.  He does have some improvement of his pain compared to yesterday when he is admitted to the hospital.  He was admitted for further evaluation of this pain and his leukocytosis.      General : Young male patient is awake alert  and oriented.  Patient does not appear septic or toxic  HEENT: NCAT  CV: Heart is regular with no murmurs  Respiratory: CTA bilaterally  Abd: Soft and morbidly obese.  Location of pain is just left of midline in the left flank radiates under the left side of his chest and right at the costophrenic angle as well as his left upper quadrant of his abdomen left lower quadrant into his left testicle.  His belly is soft.  There is no guarding or rebound.  Has normal bowel sounds  GENITOURINARY: Normal phallus. Bilaterally descended testes without masses. No scrotal masses. No hernia.  No inguinal adenopathy.  No rash or signs of infection. No urethral discharge. Nontender to palpation.  Back exam: I do not appreciate any focal tenderness in palpation to his cervical thoracic and lumbar spine.  His pain is more left of midline as mentioned above left flank with radiation as mentioned above.  Normal inspection.  Pain is reproducible with palpation as well as with movement such as getting up and twisting.  There is a component that is worse with deep breathing which is likely related to movement of his paraspinal muscles.  He denies being short of breath  Ext: No acute abnormalities.  Intact distal pulses to upper and lower extremities are equal strong and symmetric.  Skin: No rash  Neuro: Cranial nerves II through XII grossly intact as tested.  No acute lateralizing deficits.  Psych: Normal mood and affect    I have reviewed the patient's vital signs, lab work, EKG and imaging.    Plan:  1.  Left flank, back pain.  Also her left lower quadrant abdominal pain:.  So far the patient's work-up is unremarkable he has a CT scan without contrast of his abdomen pelvis as well as with his thoracic and lumbar spine.  There is degenerative changes seen on the CT scan of the thoracic and lumbar spine.  CT scan of the abdomen pelvis was unremarkable.  Patient has remained with persistent leukocytosis about 16,000.  Patient's  procalcitonin and lactic acid are now normal.  Blood cultures are pending.  Patient did have a dimer that was normal.  This pain does sound somewhat musculoskeletal but why does he have the subjective fevers.  Had a temperature of 99.8 last night.  And he still has persistent leukocytosis.  We will check a sed rate and CRP and check a CT scan of the chest abdomen pelvis with IV contrast.  It is very possible this patient will need to be transition to a full admit for persistent pain and persistent leukocytosis.  Pain has improved from yesterday but is still present.  I do not believe this is cardiac in etiology given his description of pain.  He has had 2 negative troponins.  Discussed the plan with the patient as well as Radha the midlevel provider.  All questions answered        Note Disclaimer: At [facility], we believe that sharing information builds trust and better relationships. You are receiving this note because you recently visited [facility]. It is possible you will see health information before a provider has talked with you about it. This kind of information can be easy to misunderstand. To help you fully understand what it means for your health, we urge you to discuss this note with your provider.    13:40 EST  CT angiogram of the chest as well as CT scan of the abdomen pelvis report was reviewed.  There was no obvious central pulmonary embolism.  I think PE is very unlikely and the scope of an unremarkable CT angiogram and a normal dimer.  His CT scan of his abdomen pelvis is also unremarkable.  His history is not 100% consistent with PAD.  He does have an elevation of his CRP at 10 and his sed rate is 68.  I am concerned this gentleman could have potential infectious etiology.  I cannot find the etiology at this time.  I think he is getting need an MRI of his thoracic and lumbar spine given his location of pain.  This patient will need to be transferred from the observation unit to full  admission.  I discussed the plan with Radha the midlevel provider.  Blood cultures have been collected and are pending.  We will consult the admitting physician to see if they would like us to presumptively start him on antibiotics at this time.  Patient's procalcitonin and lactic acid are normal.    Electronically signed by Arnol Bennett MD at 02/09/23 1342     Rudy Berger MD at 02/08/23 7822          MD ATTESTATION NOTE    The CHELSEA and I have discussed this patient's history, physical exam, and treatment plan.  I have reviewed the documentation and personally had a face to face interaction with the patient. I affirm the documentation and agree with the treatment and plan.  The attached note describes my personal findings.      I provided a substantive portion of the care of the patient.  I personally performed the physical exam in its entirety, and below are my findings.  For this patient encounter, the patient wore surgical mask, I wore full protective PPE including N95 and eye protection.      Brief HPI: 37-year-old gentleman who presented the emergency department with flank and abdominal pain.  This seems to have begun after he was doing some work in bent over an awkward position.  He has a history of back issues, and his work-up in the ED was fairly unremarkable.  Currently he says that the pain is mostly in his back, with perhaps some radicular symptoms coming around the chest and upper abdomen but no true abdominal pain nausea or vomiting.    PHYSICAL EXAM  ED Triage Vitals   Temp Heart Rate Resp BP SpO2   02/08/23 1742 02/08/23 1742 02/08/23 1742 02/08/23 1743 02/08/23 1742   98.8 °F (37.1 °C) 119 20 (!) 150/116 97 %      Temp src Heart Rate Source Patient Position BP Location FiO2 (%)   02/08/23 2100 02/08/23 2100 02/08/23 2100 02/08/23 2100 --   Oral Monitor Lying Right arm          GENERAL: no acute distress  HENT: nares patent  EYES: no scleral icterus  CV: regular rhythm, normal rate, distal  pulses symmetric and intact  RESPIRATORY: normal effort  ABDOMEN: soft, nondistended and nontender  MUSCULOSKELETAL: no deformity.  Diffuse thoracolumbar tenderness with spasm, but no step-off or deformity and neurovascular intact  NEURO: alert, moves all extremities, follows commands  PSYCH:  calm, cooperative  SKIN: warm, dry    Vital signs and nursing notes reviewed.    MDM DISCUSSION        Plan: Currently, the patient is admitted the observation unit for further evaluation of the pain.  At this point his symptoms as he describes them seem more related to his back then to his abdomen.  We will continue to evaluate including CT scan of the thoracic and lumbar spine as well as optimize blood pressure management and pain control.  We will reevaluate his abdomen in the a.m. to see if surgery consult is indicated      Electronically signed by Rudy Berger MD at 02/08/23 0228

## 2023-02-09 NOTE — ED PROVIDER NOTES
Pt presents to the ED c/o  left flank pain rating down to the left lower quadrant as well as some radiation up towards the chest.  Has had nausea but no vomiting.  No diarrhea.  No dysuria or urinary urgency or frequency.  No fevers or chills or cough.  Pain started abruptly earlier today.  Does seem to be worse with certain positional movements.      On exam,   General: Appears uncomfortable, nontoxic, nondiaphoretic  HEENT: Mucous membranes moist, atraumatic, EOMI  Neck: Full ROM  Pulm: Symmetric chest rise, nonlabored, lungs CTAB  Cardiovascular: Regular rate and rhythm, intact distal pulses  GI: Soft, reproducible tenderness left mid to lower abdomen, nondistended, no rebound, no guarding, bowel sounds present  MSK: Full ROM, no deformity  Skin: Warm, dry  Neuro: Awake, alert, oriented x 4, GCS 15, moving all extremities, no focal deficits  Psych: Calm, cooperative      N95, protective eye goggles, and gloves used during this encounter. Patient in surgical mask.    EKG    EKG Time: 2050  Rhythm/Rate: Sinus tachycardia with rate of 101  Borderline left axis deviation  Normal intervals  No acute ischemic changes  No STEMI     Interpreted Contemporaneously by me, independently viewed  No emergent changes compared to July 6, 2019        Plan:   ED Course as of 02/08/23 2352 Wed Feb 08, 2023 1955 WBC(!): 17.94 [DC]   2108 I discussed the case with CHELSEA oneill with Obs unit at this time regarding the patient.  I discussed work-up, results, concerns.  I discussed the consulting provider's desire for obs admit.   [DC]      ED Course User Index  [DC] Brigido Thakkar PA     Patient's pain is not really significantly improved after treatment, does have leukocytosis 17,000.  CT scan is really fairly unremarkable with no acute source for infection, no evidence of any urolithiasis or ureteral obstruction, no bowel obstruction, no evidence of colitis or appendicitis.  Unclear etiology of pain but he still appears quite  uncomfortable with reproducible tenderness to palpation and leukocytosis, I think hospitalization to be warranted at this time.  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  Plan for observation unit stay.  All questions and concerns addressed.     Attestation:  The CHELSEA and I have discussed this patient's history, physical exam, and treatment plan.  I have reviewed the documentation and personally had a face to face interaction with the patient. I affirm the documentation and agree with the treatment and plan.  The attached note describes my personal findings.          Dillon Castaneda MD  02/09/23 0157

## 2023-02-10 ENCOUNTER — APPOINTMENT (OUTPATIENT)
Dept: MRI IMAGING | Facility: HOSPITAL | Age: 38
End: 2023-02-10
Payer: MEDICAID

## 2023-02-10 PROBLEM — R10.9 LEFT SIDED ABDOMINAL PAIN: Status: ACTIVE | Noted: 2023-02-10

## 2023-02-10 LAB
GLUCOSE BLDC GLUCOMTR-MCNC: 153 MG/DL (ref 70–130)
GLUCOSE BLDC GLUCOMTR-MCNC: 182 MG/DL (ref 70–130)
GLUCOSE BLDC GLUCOMTR-MCNC: 189 MG/DL (ref 70–130)
GLUCOSE BLDC GLUCOMTR-MCNC: 287 MG/DL (ref 70–130)

## 2023-02-10 PROCEDURE — 82962 GLUCOSE BLOOD TEST: CPT

## 2023-02-10 PROCEDURE — A9577 INJ MULTIHANCE: HCPCS | Performed by: STUDENT IN AN ORGANIZED HEALTH CARE EDUCATION/TRAINING PROGRAM

## 2023-02-10 PROCEDURE — 63710000001 INSULIN LISPRO (HUMAN) PER 5 UNITS: Performed by: STUDENT IN AN ORGANIZED HEALTH CARE EDUCATION/TRAINING PROGRAM

## 2023-02-10 PROCEDURE — 72158 MRI LUMBAR SPINE W/O & W/DYE: CPT

## 2023-02-10 PROCEDURE — 25010000002 LORAZEPAM PER 2 MG: Performed by: NURSE PRACTITIONER

## 2023-02-10 PROCEDURE — 96375 TX/PRO/DX INJ NEW DRUG ADDON: CPT

## 2023-02-10 PROCEDURE — 72157 MRI CHEST SPINE W/O & W/DYE: CPT

## 2023-02-10 PROCEDURE — 99223 1ST HOSP IP/OBS HIGH 75: CPT | Performed by: INTERNAL MEDICINE

## 2023-02-10 PROCEDURE — G0378 HOSPITAL OBSERVATION PER HR: HCPCS

## 2023-02-10 PROCEDURE — 0 GADOBENATE DIMEGLUMINE 529 MG/ML SOLUTION: Performed by: STUDENT IN AN ORGANIZED HEALTH CARE EDUCATION/TRAINING PROGRAM

## 2023-02-10 PROCEDURE — 99214 OFFICE O/P EST MOD 30 MIN: CPT | Performed by: NURSE PRACTITIONER

## 2023-02-10 RX ORDER — GABAPENTIN 300 MG/1
300 CAPSULE ORAL EVERY 8 HOURS SCHEDULED
Status: DISCONTINUED | OUTPATIENT
Start: 2023-02-10 | End: 2023-02-11 | Stop reason: HOSPADM

## 2023-02-10 RX ORDER — LORAZEPAM 2 MG/ML
2 INJECTION INTRAMUSCULAR ONCE AS NEEDED
Status: DISCONTINUED | OUTPATIENT
Start: 2023-02-10 | End: 2023-02-11 | Stop reason: HOSPADM

## 2023-02-10 RX ORDER — LORAZEPAM 2 MG/ML
1.5 INJECTION INTRAMUSCULAR ONCE
Status: COMPLETED | OUTPATIENT
Start: 2023-02-10 | End: 2023-02-10

## 2023-02-10 RX ORDER — LORAZEPAM 2 MG/ML
1 INJECTION INTRAMUSCULAR ONCE AS NEEDED
Status: DISCONTINUED | OUTPATIENT
Start: 2023-02-10 | End: 2023-02-10

## 2023-02-10 RX ORDER — LIDOCAINE 50 MG/G
1 PATCH TOPICAL
Status: DISCONTINUED | OUTPATIENT
Start: 2023-02-10 | End: 2023-02-11 | Stop reason: HOSPADM

## 2023-02-10 RX ADMIN — HYDROCODONE BITARTRATE AND ACETAMINOPHEN 1 TABLET: 5; 325 TABLET ORAL at 08:45

## 2023-02-10 RX ADMIN — ATORVASTATIN CALCIUM 20 MG: 20 TABLET, FILM COATED ORAL at 08:46

## 2023-02-10 RX ADMIN — GABAPENTIN 300 MG: 300 CAPSULE ORAL at 12:21

## 2023-02-10 RX ADMIN — METHOCARBAMOL TABLETS 750 MG: 750 TABLET, COATED ORAL at 08:46

## 2023-02-10 RX ADMIN — HYDROCODONE BITARTRATE AND ACETAMINOPHEN 1 TABLET: 5; 325 TABLET ORAL at 19:01

## 2023-02-10 RX ADMIN — HYDROCHLOROTHIAZIDE 25 MG: 25 TABLET ORAL at 08:46

## 2023-02-10 RX ADMIN — INSULIN LISPRO 8 UNITS: 100 INJECTION, SOLUTION INTRAVENOUS; SUBCUTANEOUS at 12:21

## 2023-02-10 RX ADMIN — INSULIN GLARGINE-YFGN 90 UNITS: 100 INJECTION, SOLUTION SUBCUTANEOUS at 08:47

## 2023-02-10 RX ADMIN — LIDOCAINE 1 PATCH: 50 PATCH CUTANEOUS at 12:21

## 2023-02-10 RX ADMIN — METHOCARBAMOL TABLETS 750 MG: 750 TABLET, COATED ORAL at 12:21

## 2023-02-10 RX ADMIN — INSULIN LISPRO 3 UNITS: 100 INJECTION, SOLUTION INTRAVENOUS; SUBCUTANEOUS at 08:46

## 2023-02-10 RX ADMIN — INSULIN LISPRO 3 UNITS: 100 INJECTION, SOLUTION INTRAVENOUS; SUBCUTANEOUS at 17:01

## 2023-02-10 RX ADMIN — Medication 10 ML: at 08:47

## 2023-02-10 RX ADMIN — GADOBENATE DIMEGLUMINE 20 ML: 529 INJECTION, SOLUTION INTRAVENOUS at 18:30

## 2023-02-10 RX ADMIN — LISINOPRIL 20 MG: 20 TABLET ORAL at 08:46

## 2023-02-10 RX ADMIN — LORAZEPAM 1.5 MG: 2 INJECTION INTRAMUSCULAR; INTRAVENOUS at 17:01

## 2023-02-10 RX ADMIN — ASPIRIN 81 MG: 81 TABLET, COATED ORAL at 08:46

## 2023-02-10 RX ADMIN — METHOCARBAMOL TABLETS 750 MG: 750 TABLET, COATED ORAL at 17:00

## 2023-02-10 NOTE — PLAN OF CARE
Goal Outcome Evaluation:  Plan of Care Reviewed With: patient        Progress: no change  Outcome Evaluation: See MAR for pain medication management. pt attempted MRI with PO Valium but unsuccessful. VSS. Denies other needs.

## 2023-02-10 NOTE — NURSING NOTE
Patient agreeable to MRI if properly sedated. 2mg Valium PO did not help 2/9, will trial 1.5mg Ativan IV. Waiting for MRI

## 2023-02-10 NOTE — CONSULTS
Referring Provider: Dr. Bennett  Reason for Consultation: Elevated inflammatory markers and leukocytosis    Subjective   History of present illness: This is a 37-year-old male with a history of type 2 diabetes hyperlipidemia and hypertension who was admitted on February 8 with left flank pain.  The patient states he developed left flank pain radiating to the left lower quadrant 2 days prior to admission.  Has been waxing and waning in intensity.  He does report some nausea but denies any vomiting.  He denies any fevers chills or night sweats.  No dysuria or increasing urinary frequency.  Patient states he has a history of chronic back pain that comes and goes.  On February 7 he started experiencing his usual back pain and then was lifting heavy stuff out of his trunk and woke up the next day with severe back pain to the point that he was not able to move.  Admission blood work revealed a leukocytosis of 17,000 and a negative procalcitonin.  Urinalysis was unremarkable.  CAT scan of the chest abdomen pelvis was unremarkable.  CT of the thoracic and lumbar spine showed degenerative disease but no evidence of infection.  Currently the patient states his pain is better.  He denies any nausea vomiting or diarrhea.  No shortness of breath or cough.  No rashes.  No recent procedures    Past Medical History:   Diagnosis Date   • Asthma    • Diabetes (HCC)    • Hyperlipidemia    • Hypertension    • Optic nerve hemorrhage, left        Past Surgical History:   Procedure Laterality Date   • TONSILLECTOMY AND ADENOIDECTOMY          reports that he has never smoked. He has never used smokeless tobacco. He reports current alcohol use. He reports that he does not use drugs.    family history includes Diabetes in his father, paternal grandfather, and paternal grandmother; Hypertension in his maternal grandfather and maternal grandmother.    Allergies   Allergen Reactions   • Metformin Other (See Comments)     Fatigue and muscle  aches       Medication:  Antibiotics:    Please refer to the medical record for a full medication list    Review of Systems  Pertinent items are noted in HPI, all other systems reviewed and negative    Objective   Vital Signs   Temp:  [97.8 °F (36.6 °C)-99 °F (37.2 °C)] 98.3 °F (36.8 °C)  Heart Rate:  [] 80  Resp:  [18] 18  BP: (113-134)/(80-91) 114/87    Physical Exam:   General: In no acute distress  HEENT: Normocephalic, atraumatic no scleral icterus.   Neck: Supple, trachea is midline  Cardiovascular: Normal rate, regular rhythm, normal S1 and S2, no murmurs, rubs, or gallops   Respiratory: Lungs are clear to auscultation bilaterally, no wheezing  GI: Abdomen is soft, nontender, nondistended, positive bowel sounds bilaterally,  Musculoskeletalno edema, tenderness or deformity  Skin: No rashes   Extremities: No C/C, mild swelling of the left foot, surgical incision healed well no induration or erythema  Neurological: Alert and oriented, moving all 4 extremities  Psychiatric: Normal mood and affect     Results Review:   I reviewed the patient's new clinical results.  I reviewed the patient's new imaging results and agree with the interpretation.    Lab Results   Component Value Date    WBC 16.21 (H) 02/09/2023    HGB 16.0 02/09/2023    HCT 47.5 02/09/2023    MCV 86.8 02/09/2023     02/09/2023       Lab Results   Component Value Date    GLUCOSE 154 (H) 02/09/2023    BUN 17 02/09/2023    CREATININE 0.74 (L) 02/09/2023    EGFRIFNONA 75 03/12/2021    EGFRIFAFRI 90 03/12/2021    BCR 23.0 02/09/2023    CO2 23.6 02/09/2023    CALCIUM 9.3 02/09/2023    PROTENTOTREF 7.6 03/12/2021    ALBUMIN 4.3 02/08/2023    LABIL2 0.9 (L) 02/10/2022    AST 13 02/08/2023    ALT 23 02/08/2023     Hemoglobin A1c 7.9  CRP 10.32  ESR 66  Procalcitonin 0.06    Urinalysis 0-2 white blood cells no bacteria seen    Microbiology:  2/9 BCx NGTD x 2    Radiology:  CT angiogram of the chest shows no evidence of PE.  Dilated aortic root  and ascending thoracic aorta unchanged as compared to 2019    CAT scan of the abdomen pelvis shows no acute abnormality.  Subcentimeter left periaortic lymph nodes most likely reactive.    CT of the lumbar and thoracic spine showed degenerative changes.  No evidence of infection.    Assessment & Plan   Leukocytosis  Elevated inflammatory markers  Poorly controlled type 2 diabetes  Acute on chronic back pain radiating to his left groin    CRP and ESR are markers of inflammation are not specific for infection.  He does have a leukocytosis of unclear etiology at this time but this could be a reactive process.  He denies any fevers or chills prior to admission.  Blood cultures have been obtained and are negative to date x2 and we will follow those up.    Follow-up MRI results and blood cultures.  We will review the patient's chart over the weekend and see again if any infectious issues arise.    Continue to hold all antibiotics    I discussed the patient's findings and my recommendations with patient, family and nursing staff

## 2023-02-10 NOTE — PROGRESS NOTES
Name: Osvaldo Welsh ADMIT: 2023   : 1985  PCP: Chuy Hays APRN    MRN: [id number] LOS: 1 days   AGE/SEX: 37 y.o. male  ROOM: [facility]     Subjective   Subjective   Still having significant back pain radiating around left side and into groin.  No cough, fevers, chills.    Objective   Objective   Vital Signs  Temp:  [97.8 °F (36.6 °C)-99 °F (37.2 °C)] 98.3 °F (36.8 °C)  Heart Rate:  [80-99] 80  Resp:  [18] 18  BP: (113-134)/(80-91) 127/84  SpO2:  [94 %-98 %] 94 %  on   ;   Device (Oxygen Therapy): room air  Body mass index is 39.8 kg/m².  Physical Exam  Constitutional:       Appearance: Normal appearance. He is obese.   Cardiovascular:      Rate and Rhythm: Normal rate and regular rhythm.      Pulses: Normal pulses.      Heart sounds: No murmur heard.    No gallop.   Pulmonary:      Effort: Pulmonary effort is normal.      Breath sounds: Normal breath sounds.   Abdominal:      General: Abdomen is flat. There is no distension.      Palpations: Abdomen is soft.      Tenderness: There is no abdominal tenderness.   Musculoskeletal:         General: Normal range of motion.      Right lower leg: No edema.      Left lower leg: No edema.   Neurological:      General: No focal deficit present.      Mental Status: He is alert.      Comments: 5 out of 5 strength bilateral upper and lower extremity         Results Review     I reviewed the patient's new clinical results.  Results from last 7 days   Lab Units 23  0840 23  1758   WBC 10*3/mm3 16.21* 17.94*   HEMOGLOBIN g/dL 16.0 16.6   PLATELETS 10*3/mm3 286 283     Results from last 7 days   Lab Units 23  0840 23  1758   SODIUM mmol/L 135* 135*   POTASSIUM mmol/L 4.1 4.2   CHLORIDE mmol/L 100 97*   CO2 mmol/L 23.6 25.7   BUN mg/dL 17 21*   CREATININE mg/dL 0.74* 0.92   GLUCOSE mg/dL 154* 174*   EGFR mL/min/1.73 118.2 108.5     Results from last 7 days   Lab Units 23  1758   ALBUMIN g/dL 4.3   BILIRUBIN mg/dL 0.4   ALK PHOS U/L  140*   AST (SGOT) U/L 13   ALT (SGPT) U/L 23     Results from last 7 days   Lab Units 02/09/23  0840 02/08/23  1758   CALCIUM mg/dL 9.3 9.5   ALBUMIN g/dL  --  4.3     Results from last 7 days   Lab Units 02/09/23  0108   PROCALCITONIN ng/mL 0.06   LACTATE mmol/L 1.2     Hemoglobin A1C   Date/Time Value Ref Range Status   02/08/2023 1758 7.90 (H) 4.80 - 5.60 % Final     Glucose   Date/Time Value Ref Range Status   02/10/2023 1149 287 (H) 70 - 130 mg/dL Final     Comment:     Meter: [id number] : [id number] Aquilino Dela Cruz SUHA   02/10/2023 0639 153 (H) 70 - 130 mg/dL Final     Comment:     Meter: [id number] : [id number] Aye Palomo NA   02/09/2023 2044 150 (H) 70 - 130 mg/dL Final     Comment:     Meter: [id number] : [id number] Aye Palomo NA   02/09/2023 1708 195 (H) 70 - 130 mg/dL Final     Comment:     Meter: [id number] : [id number] Normalaurel Cuellar Unit Tech   02/09/2023 1153 246 (H) 70 - 130 mg/dL Final     Comment:     Meter: [id number] : [id number] Adam BURR   02/09/2023 0726 135 (H) 70 - 130 mg/dL Final     Comment:     Meter: [id number] : [id number] Adam BURR       CT Abdomen Pelvis Without Contrast    Result Date: 2/8/2023    1. No urolithiasis or hydronephrosis. 2. No acute inflammatory process of bowel is identified, follow up as indications persist.  This report was finalized on 2/8/2023 7:50 PM by Dr. Estevan Man M.D.      CT Thoracic Spine Without Contrast    Result Date: 2/8/2023  As above. Degenerative changes, most significant at L4-L5 and L5-S1.  Radiation dose reduction techniques were utilized, including automated exposure control and exposure modulation based on body size.  This report was finalized on 2/8/2023 11:39 PM by Dr. Allegra Corrigan M.D.      CT Lumbar Spine Without Contrast    Result Date: 2/8/2023  As above. Degenerative changes, most significant at L4-L5 and L5-S1.  Radiation dose reduction techniques were utilized, including automated exposure  control and exposure modulation based on body size.  This report was finalized on 2/8/2023 11:39 PM by Dr. Allegra Corrigan M.D.      CT Abdomen Pelvis With Contrast    Result Date: 2/10/2023  1. Limited exam due to contrast bolus timing. No convincing evidence of pulmonary artery thromboembolism. 2. Dilated aortic root and ascending thoracic aorta without change from 2019.  CT ABDOMEN AND PELVIS WITH CONTRAST  HISTORY: Left flank pain and leukocytosis.  TECHNIQUE: Axial CT images of the abdomen and pelvis were obtained following administration of intravenous contrast. The patient was not given oral contrast Coronal and sagittal reformats were obtained.  COMPARISON: 02/08/2023  FINDINGS: The small and large bowel loops demonstrate normal caliber. The appendix is normal. No appreciable bowel wall thickening.  The liver, gallbladder, spleen and pancreas are normal. Bilateral adrenal glands are normal. No renal calculi or hydronephrosis. Small retroperitoneal lymph nodes are present without pathological enlargement. The urinary bladder is moderately distended and normal. Multiple subcentimeter retroperitoneal lymph nodes are present and favored to be reactive. Majority of these are located within the left para-aortic location at the level of the kidneys. No ascites or focal fluid collection.  IMPRESSION: No acute abnormality within the abdomen and pelvis. Subcentimeter left para-aortic lymph nodes are favored to be reactive.  Radiation dose reduction techniques were utilized, including automated exposure control and exposure modulation based on body size.  This report was finalized on 2/10/2023 10:25 AM by Dr. Jessa Perez M.D.      CT Angiogram Chest    Result Date: 2/10/2023  1. Limited exam due to contrast bolus timing. No convincing evidence of pulmonary artery thromboembolism. 2. Dilated aortic root and ascending thoracic aorta without change from 2019.  CT ABDOMEN AND PELVIS WITH CONTRAST  HISTORY: Left flank  pain and leukocytosis.  TECHNIQUE: Axial CT images of the abdomen and pelvis were obtained following administration of intravenous contrast. The patient was not given oral contrast Coronal and sagittal reformats were obtained.  COMPARISON: 02/08/2023  FINDINGS: The small and large bowel loops demonstrate normal caliber. The appendix is normal. No appreciable bowel wall thickening.  The liver, gallbladder, spleen and pancreas are normal. Bilateral adrenal glands are normal. No renal calculi or hydronephrosis. Small retroperitoneal lymph nodes are present without pathological enlargement. The urinary bladder is moderately distended and normal. Multiple subcentimeter retroperitoneal lymph nodes are present and favored to be reactive. Majority of these are located within the left para-aortic location at the level of the kidneys. No ascites or focal fluid collection.  IMPRESSION: No acute abnormality within the abdomen and pelvis. Subcentimeter left para-aortic lymph nodes are favored to be reactive.  Radiation dose reduction techniques were utilized, including automated exposure control and exposure modulation based on body size.  This report was finalized on 2/10/2023 10:25 AM by Dr. Jessa Perez M.D.      Scheduled Medications  aspirin, 81 mg, Oral, Daily  atorvastatin, 20 mg, Oral, Daily  empagliflozin, 25 mg, Oral, Daily  gabapentin, 300 mg, Oral, Q8H  lisinopril, 20 mg, Oral, Q24H   And  hydroCHLOROthiazide, 25 mg, Oral, Q24H  insulin glargine, 90 Units, Subcutaneous, Daily  insulin lispro, 0-14 Units, Subcutaneous, TID AC  lidocaine, 1 patch, Transdermal, Q24H  LORazepam, 1.5 mg, Intravenous, Once  methocarbamol, 750 mg, Oral, 4x Daily  sodium chloride, 10 mL, Intravenous, Q12H    Infusions   Diet  Diet: Cardiac Diets, Diabetic Diets; Healthy Heart (2-3 Na+); Consistent Carbohydrate; Texture: Regular Texture (IDDSI 7); Fluid Consistency: Thin (IDDSI 0)       Assessment/Plan     Active Hospital Problems     Diagnosis  POA   • **Abdominal pain [R10.9]  Yes   • Left low back pain [M54.50]  Yes   • Type 2 diabetes mellitus with hyperglycemia, with long-term current use of insulin (HCC) [E11.65, Z79.4]  Not Applicable   • Essential hypertension [I10]  Yes      Resolved Hospital Problems   No resolved problems to display.       37 y.o. male admitted with Abdominal pain.      02/10/23  Attempt a repeat MRI    Low back pain  -in setting of elevated inflammatory markers, leukocytosis  -no obvious weakness on exam  -MRI pending  -HERIBERTO previously consulted in observation unit    DM2  -Glargine 90 units, SSI  -Jardiance 25 mg  -Has known neuropathy and was previously on gabapentin though only tried for a couple days because he does not like taking medicine  -Trial of gabapentin 300 mg 3 times daily    HTN  -HCTZ 25 mg, lisinopril 20 mg    HLD  -Atorvastatin 20 mg      · SCDs for DVT prophylaxis.  · Full code.  · Discussed with patient and care team on multidisciplinary rounds.  · Anticipate discharge home when cleared by consultants      Cristhian Potter MD  [facility]  02/10/23  14:02 EST

## 2023-02-10 NOTE — PROGRESS NOTES
[facility] THORACIC/LUMBAR NEUROSURGERY PROGRESS NOTE      CC: back pain, groin pain, testicular pain      Subjective     Interval History: still with chief complaints but does feel a little better. could not tolerate MRI last night and would like to do as outpatient in an open MRI machine. spoke with his spouse who will try and convince him to stay and have done under more sedation.    ROS:  Constitutional: No fever, chills  GI: No nausea, vomiting, constipation  MS: + back pain  Neuro: No numbness, tingling, or weakness,  no balance difficulties  : No difficulty voiding, no incontinence    Objective     Vital signs in last 24 hours:  Temp:  [97.8 °F (36.6 °C)-99 °F (37.2 °C)] 98.7 °F (37.1 °C)  Heart Rate:  [80-95] 86  Resp:  [18] 18  BP: (113-131)/(80-91) 127/84    Intake/Output this shift:  I/O this shift:  In: 240 [P.O.:240]  Out: -     LABS:  Results from last 7 days   Lab Units 02/09/23  0840 02/08/23  1758   WBC 10*3/mm3 16.21* 17.94*   HEMOGLOBIN g/dL 16.0 16.6   HEMATOCRIT % 47.5 48.6   PLATELETS 10*3/mm3 286 283       Results from last 7 days   Lab Units 02/09/23  0840 02/08/23  1758   SODIUM mmol/L 135* 135*   POTASSIUM mmol/L 4.1 4.2   CHLORIDE mmol/L 100 97*   CO2 mmol/L 23.6 25.7   BUN mg/dL 17 21*   CREATININE mg/dL 0.74* 0.92   GLUCOSE mg/dL 154* 174*   CALCIUM mg/dL 9.3 9.5         IMAGING STUDIES:    no new imaging. MRI pending.      I personally viewed and interpreted the patient's chart.    Meds reviewed/changed: Yes      Physical Exam:    Awake, alert, oriented x3, speech is clear  left lower  to palpation/percussion  left foot with some edema  Normal muscle strength, bulk and tone in lower extremities.  No fasciculations, rigidity, spasticity, or abnormal movements.  no clonus  Normal to light touch jagdeep LEs  independent gait, up ad halle        Assessment & Plan     ASSESSMENT:      Abdominal pain    Type 2 diabetes mellitus with hyperglycemia, with long-term current use of insulin  "(HCC)    Essential hypertension    Left low back pain    Left sided abdominal pain      PLAN:     · patient has decided to attempt to go forward with MRI. Ativan has been ordered to premedicate. Further recommendations to follow once imaging is complete. we will see again when MRIs are back.  · continue robaxin and adding gabapentin   · okay to shower    I discussed the patient's findings and my recommendations with Dr. De Santiago, patient, family and nursing staff    During patient visit, I utilized appropriate personal protective equipment including mask and gloves.  Mask used was standard procedure mask. Appropriate PPE was worn during the entire visit.  Hand hygiene was completed before and after.     Greater than 50% of time spent in counseling and/or coordination of care. Total face/floor time 35 minutes.       LOS: 1 day       Maru Reed, APRN  2/10/2023  17:22 EST    \"Dictated utilizing Dragon dictation\".      "

## 2023-02-10 NOTE — PLAN OF CARE
Goal Outcome Evaluation:  Plan of Care Reviewed With: patient, spouse        Progress: improving  Outcome Evaluation: DEB CRUZ RA., SR w/ 20beat run SVT @ 6678, saved in Spac[employer]. MRI in process. Pain well controlled, much better than previously per patient. CTM

## 2023-02-11 VITALS
WEIGHT: 310 LBS | BODY MASS INDEX: 39.78 KG/M2 | HEART RATE: 93 BPM | RESPIRATION RATE: 18 BRPM | HEIGHT: 74 IN | DIASTOLIC BLOOD PRESSURE: 59 MMHG | SYSTOLIC BLOOD PRESSURE: 106 MMHG | TEMPERATURE: 98.8 F | OXYGEN SATURATION: 100 %

## 2023-02-11 PROBLEM — M51.36 DDD (DEGENERATIVE DISC DISEASE), LUMBAR: Status: ACTIVE | Noted: 2023-02-11

## 2023-02-11 LAB
ANION GAP SERPL CALCULATED.3IONS-SCNC: 14 MMOL/L (ref 5–15)
BASOPHILS # BLD AUTO: 0.08 10*3/MM3 (ref 0–0.2)
BASOPHILS NFR BLD AUTO: 0.6 % (ref 0–1.5)
BUN SERPL-MCNC: 26 MG/DL (ref 6–20)
BUN/CREAT SERPL: 31.7 (ref 7–25)
CALCIUM SPEC-SCNC: 9.2 MG/DL (ref 8.6–10.5)
CHLORIDE SERPL-SCNC: 97 MMOL/L (ref 98–107)
CO2 SERPL-SCNC: 22 MMOL/L (ref 22–29)
CREAT SERPL-MCNC: 0.82 MG/DL (ref 0.76–1.27)
DEPRECATED RDW RBC AUTO: 37.8 FL (ref 37–54)
EGFRCR SERPLBLD CKD-EPI 2021: 114.6 ML/MIN/1.73
EOSINOPHIL # BLD AUTO: 0.25 10*3/MM3 (ref 0–0.4)
EOSINOPHIL NFR BLD AUTO: 2 % (ref 0.3–6.2)
ERYTHROCYTE [DISTWIDTH] IN BLOOD BY AUTOMATED COUNT: 12.2 % (ref 12.3–15.4)
GLUCOSE BLDC GLUCOMTR-MCNC: 165 MG/DL (ref 70–130)
GLUCOSE BLDC GLUCOMTR-MCNC: 211 MG/DL (ref 70–130)
GLUCOSE SERPL-MCNC: 184 MG/DL (ref 65–99)
HCT VFR BLD AUTO: 50.8 % (ref 37.5–51)
HGB BLD-MCNC: 16.8 G/DL (ref 13–17.7)
IMM GRANULOCYTES # BLD AUTO: 0.06 10*3/MM3 (ref 0–0.05)
IMM GRANULOCYTES NFR BLD AUTO: 0.5 % (ref 0–0.5)
LYMPHOCYTES # BLD AUTO: 4.05 10*3/MM3 (ref 0.7–3.1)
LYMPHOCYTES NFR BLD AUTO: 32.7 % (ref 19.6–45.3)
MCH RBC QN AUTO: 28.4 PG (ref 26.6–33)
MCHC RBC AUTO-ENTMCNC: 33.1 G/DL (ref 31.5–35.7)
MCV RBC AUTO: 85.8 FL (ref 79–97)
MONOCYTES # BLD AUTO: 1.02 10*3/MM3 (ref 0.1–0.9)
MONOCYTES NFR BLD AUTO: 8.2 % (ref 5–12)
NEUTROPHILS NFR BLD AUTO: 56 % (ref 42.7–76)
NEUTROPHILS NFR BLD AUTO: 6.92 10*3/MM3 (ref 1.7–7)
NRBC BLD AUTO-RTO: 0 /100 WBC (ref 0–0.2)
PLATELET # BLD AUTO: 316 10*3/MM3 (ref 140–450)
PMV BLD AUTO: 9.6 FL (ref 6–12)
POTASSIUM SERPL-SCNC: 4.1 MMOL/L (ref 3.5–5.2)
RBC # BLD AUTO: 5.92 10*6/MM3 (ref 4.14–5.8)
SODIUM SERPL-SCNC: 133 MMOL/L (ref 136–145)
WBC NRBC COR # BLD: 12.38 10*3/MM3 (ref 3.4–10.8)

## 2023-02-11 PROCEDURE — 63710000001 INSULIN LISPRO (HUMAN) PER 5 UNITS: Performed by: STUDENT IN AN ORGANIZED HEALTH CARE EDUCATION/TRAINING PROGRAM

## 2023-02-11 PROCEDURE — 82962 GLUCOSE BLOOD TEST: CPT

## 2023-02-11 PROCEDURE — 85025 COMPLETE CBC W/AUTO DIFF WBC: CPT | Performed by: INTERNAL MEDICINE

## 2023-02-11 PROCEDURE — 80048 BASIC METABOLIC PNL TOTAL CA: CPT | Performed by: STUDENT IN AN ORGANIZED HEALTH CARE EDUCATION/TRAINING PROGRAM

## 2023-02-11 PROCEDURE — 99213 OFFICE O/P EST LOW 20 MIN: CPT | Performed by: NEUROLOGICAL SURGERY

## 2023-02-11 PROCEDURE — G0378 HOSPITAL OBSERVATION PER HR: HCPCS

## 2023-02-11 RX ORDER — HYDROCODONE BITARTRATE AND ACETAMINOPHEN 5; 325 MG/1; MG/1
1 TABLET ORAL EVERY 6 HOURS PRN
Qty: 12 TABLET | Refills: 0 | Status: SHIPPED | OUTPATIENT
Start: 2023-02-11 | End: 2023-02-14

## 2023-02-11 RX ORDER — LIDOCAINE 50 MG/G
1 PATCH TOPICAL
Qty: 30 PATCH | Refills: 0 | Status: SHIPPED | OUTPATIENT
Start: 2023-02-12 | End: 2023-03-14

## 2023-02-11 RX ORDER — GABAPENTIN 300 MG/1
300 CAPSULE ORAL EVERY 8 HOURS SCHEDULED
Qty: 90 CAPSULE | Refills: 0 | Status: SHIPPED | OUTPATIENT
Start: 2023-02-11

## 2023-02-11 RX ORDER — METHOCARBAMOL 750 MG/1
750 TABLET, FILM COATED ORAL 4 TIMES DAILY
Qty: 54 TABLET | Refills: 0 | Status: SHIPPED | OUTPATIENT
Start: 2023-02-11

## 2023-02-11 RX ADMIN — INSULIN GLARGINE-YFGN 90 UNITS: 100 INJECTION, SOLUTION SUBCUTANEOUS at 08:38

## 2023-02-11 RX ADMIN — GABAPENTIN 300 MG: 300 CAPSULE ORAL at 13:28

## 2023-02-11 RX ADMIN — GABAPENTIN 300 MG: 300 CAPSULE ORAL at 06:18

## 2023-02-11 RX ADMIN — LIDOCAINE 1 PATCH: 50 PATCH CUTANEOUS at 08:40

## 2023-02-11 RX ADMIN — ATORVASTATIN CALCIUM 20 MG: 20 TABLET, FILM COATED ORAL at 08:39

## 2023-02-11 RX ADMIN — HYDROCHLOROTHIAZIDE 25 MG: 25 TABLET ORAL at 08:39

## 2023-02-11 RX ADMIN — INSULIN LISPRO 5 UNITS: 100 INJECTION, SOLUTION INTRAVENOUS; SUBCUTANEOUS at 11:42

## 2023-02-11 RX ADMIN — INSULIN LISPRO 3 UNITS: 100 INJECTION, SOLUTION INTRAVENOUS; SUBCUTANEOUS at 08:37

## 2023-02-11 RX ADMIN — LISINOPRIL 20 MG: 20 TABLET ORAL at 08:39

## 2023-02-11 RX ADMIN — METHOCARBAMOL TABLETS 750 MG: 750 TABLET, COATED ORAL at 08:39

## 2023-02-11 RX ADMIN — HYDROCODONE BITARTRATE AND ACETAMINOPHEN 1 TABLET: 5; 325 TABLET ORAL at 08:46

## 2023-02-11 RX ADMIN — ASPIRIN 81 MG: 81 TABLET, COATED ORAL at 08:39

## 2023-02-11 RX ADMIN — METHOCARBAMOL TABLETS 750 MG: 750 TABLET, COATED ORAL at 11:42

## 2023-02-11 RX ADMIN — Medication 10 ML: at 08:39

## 2023-02-11 NOTE — PLAN OF CARE
Problem: Adult Inpatient Plan of Care  Goal: Plan of Care Review  2/11/2023 1442 by Thierry Allan, RN  Outcome: Ongoing, Progressing  Flowsheets  Taken 2/11/2023 1442  Progress: improving  Plan of Care Reviewed With: patient  Taken 2/11/2023 1441  Outcome Evaluation: VSS, on RA. Pain controlled with PO medications. Plan to d/c home today with neurosurgery f/u in 4 weeks.  2/11/2023 1441 by Thierry Allan, RN  Outcome: Ongoing, Progressing  Flowsheets (Taken 2/11/2023 1441)  Progress: improving  Plan of Care Reviewed With: patient  Outcome Evaluation: VSS, on RA. Pain controlled with PO medications. Plan to d/c home today with neurosurgery f/u in 4 weeks.   Goal Outcome Evaluation:

## 2023-02-11 NOTE — PROGRESS NOTES
Neurosurgery progress note    Chief complaint: Diffuse back pain    Subjective: He reports significant improvement in his back pain overnight.    Objective:  Vitals:    02/10/23 1929 02/10/23 2334 02/11/23 0801 02/11/23 1317   BP: 114/80 105/74 (!) 138/106 106/59   BP Location: Right arm Right arm Right arm Right arm   Patient Position: Sitting Lying Sitting Lying   Pulse: 91 77 103 93   Resp: 18 18 18 18   Temp: 98.5 °F (36.9 °C) 98 °F (36.7 °C) 98.4 °F (36.9 °C) 98.8 °F (37.1 °C)   TempSrc: Oral Oral Oral Oral   SpO2: 96% 95% 96% 100%   Weight:       Height:           Physical exam  Awake, alert, oriented x3  Pupils equal round reactive to light  Extraocular muscles intact  Face symmetric  Speech is fluent and clear  No pronator drift  Motor exam  Bilateral deltoids 5/5, bilateral biceps 5/5, bilateral triceps 5/5, bilateral wrist extension 5/5 bilateral hand  5/5  Bilateral hip flexion 5/5, bilateral knee extension 5/5, bilateral DF/PF 5/5  No clonus  No Walt's reflex  Gait deferred  Able to detect  light touch in all 4 extremities    Assessment/plan  37-year-old male with a 1 week history of severe back pain  -On my review of his symptoms this morning.  He reports that his back pain has significantly improved.  He described diffuse upper lumbar and lower thoracic back pain that would radiate around his left flank.  He reports that this pain is significantly reduced.  He denies any pain radiating down into his lower extremities.  Denies any changes in strength or sensation in his lower extremities.  He reports that he has had this same pain 1-2 times per year over the past 20 years.  He reports that the pain occurs after lifting heavy objects.  He reports that a few days ago before the pain started he was lifting an engine block and moving several heavy objects at his house.  I reviewed his MRI of his thoracic and lumbar spine and there is a disc osteophyte complex at the T8-9 level and a left-sided disc  bulge at the L4-5 level.  He has no signs of myelopathy or lumbar radiculopathy.  Recommend conservative management of his skeletal muscular back pain for now.  No neurosurgical intervention indicated at this time  -I will plan to see him back in my clinic in 1 month to evaluate for any changes in his symptoms

## 2023-02-11 NOTE — DISCHARGE SUMMARY
Patient Name: Osvaldo Welsh  : 1985  MRN: [id number]    Date of Admission: 2023  Date of Discharge:  2023  Primary Care Physician: Chuy Hays APRN      Chief Complaint:   Flank Pain and Abdominal Pain      Discharge Diagnoses     Active Hospital Problems    Diagnosis  POA   • **Abdominal pain [R10.9]  Yes   • DDD (degenerative disc disease), lumbar [M51.36]  Unknown   • Left sided abdominal pain [R10.9]  Yes   • Left low back pain [M54.50]  Yes   • Type 2 diabetes mellitus with hyperglycemia, with long-term current use of insulin (HCC) [E11.65, Z79.4]  Not Applicable   • Essential hypertension [I10]  Yes      Resolved Hospital Problems   No resolved problems to display.        Brief Admitting HPI     Osvaldo Welsh is a 37-year-old male, with a past medical history of hypertension, diabetes, and hyperlipidemia, presented to the emergency department with a complaint of left-sided flank pain that radiates to his left lower abdomen.  He states that he was pulling a motor out of the vehicle last night and when he was done he noticed that his back was bothering him as it has done historically.  He states that he went home went to bed and slept all night but when he got up this morning he noticed the pain in his left flank that got worse so he came to the emergency department.  He denies any nausea, vomiting, urinary retention, urinary urgency, bowel or bladder dysfunction.    Hospital Course     Pt admitted for acute low back pain.  He is initially admitted to observation unit, though was made a full admit given persistent leukocytosis and elevated inflammatory markers.  He was seen in consultation with neurosurgery who recommended MRI which revealed no evidence of infection, though degenerative disc disease with disc protrusion at L5 with possible mild involvement of L5 traversing nerve root, though no significant herniation or cord signal abnormality.  He had improvement with his pain with  symptomatic treatment.  At this point he should continue to follow-up with primary care doctor for continued management of DDD.  In addition he needs close follow-up for his other medical problems including diabetes and hypertension.  Discussed physical therapy recommendation which can be arranged with PCP as well.  His leukocytosis improved, though still slightly elevated prior to discharge.  He was evaluated by infectious disease who deemed this likely reactive and did not recommend any antibiotic treatment at this time.  Infectious work-up remain negative.  Recommend that he have a repeat CBC within 1 week per PCP to ensure continued resolution.  At this point he is stable for discharge.  Discussed with him and spouse at bedside and there are no additional questions at this time.    Discharge Plan     Degenerative disc disease  LBP  -in setting of elevated inflammatory markers, leukocytosis  -no obvious weakness on exam  -MRI pending  -HERIBERTO previously consulted in observation unit     DM2  -Glargine 90 units, SSI  -Jardiance 25 mg  -Has known neuropathy and was previously on gabapentin though only tried for a couple days because he does not like taking medicine  -Trial of gabapentin 300 mg 3 times daily     HTN  -HCTZ 25 mg, lisinopril 20 mg     HLD  -Atorvastatin 20 mg    Ascending thoracic aorta aneurysm  -Seen on CT, 4.3 cm, no change from 2019  -Continue surveillance as outpatient    Day of Discharge     Subjective:  Back pain is improved.  He is up and moving ad halle.  Ready for discharge.    Physical Exam:  Temp:  [98 °F (36.7 °C)-98.8 °F (37.1 °C)] 98.8 °F (37.1 °C)  Heart Rate:  [] 93  Resp:  [18] 18  BP: (105-138)/() 106/59  Body mass index is 39.8 kg/m².  Physical Exam  Constitutional:       Appearance: Normal appearance. He is obese.   Cardiovascular:      Rate and Rhythm: Normal rate and regular rhythm.      Pulses: Normal pulses.      Heart sounds: No murmur heard.    No gallop.    Pulmonary:      Effort: Pulmonary effort is normal.      Breath sounds: Normal breath sounds.   Abdominal:      General: Abdomen is flat. There is no distension.      Palpations: Abdomen is soft.      Tenderness: There is no abdominal tenderness.   Musculoskeletal:         General: Normal range of motion.      Right lower leg: No edema.      Left lower leg: No edema.   Neurological:      General: No focal deficit present.      Mental Status: He is alert.      Comments: 5 out of 5 strength bilateral upper and lower extremity   Consultants     Consult Orders (all) (From admission, onward)     Start     Ordered    02/09/23 1408  Inpatient Neurosurgery Consult  Once        Specialty:  Neurosurgery  Provider:  Maru Reed, MIGUEL    02/09/23 1409    02/09/23 1344  Inpatient Internal Medicine Consult  Once        Specialty:  Internal Medicine  Provider:  Sujit Haro MD    02/09/23 1344    02/09/23 1340  Inpatient Infectious Diseases Consult  Once        Specialty:  Infectious Diseases  Provider:  Jasiel Brunner MD    02/09/23 1341              Procedures     * Surgery not found *      Imaging Results (All)     Procedure Component Value Units Date/Time    MRI Lumbar Spine With & Without Contrast [[id number]] Collected: 02/10/23 1955     Updated: 02/11/23 1134    Narrative:      MRI EXAMINATION OF THE THORACIC AND LUMBAR SPINE WITH AND WITHOUT  CONTRAST     HISTORY: Back pain, evaluate for infection.     COMPARISON: CT lumbar and thoracic spine 02/08/2023. No prior MRI is  available for comparison.     MRI EXAMINATION OF THE LUMBAR SPINE WITH AND WITHOUT CONTRAST:     FINDINGS: The alignment of the lumbar spine is within normal limits.  There is mild disc desiccation and loss of disc height at L4-L5.  Prominent anterior osteophytes are appreciated anterior laterally at  L1-L2 and L2-L3. The sagittal T2 sequence demonstrates prominent nodes  anterolateral to the lumbar spine of the retroperitoneum from  L1 to L3  which enhance after contrast administration. Prominent osteophytes are  noted anterolaterally to the left at L1-L2 and to lesser extent L2-L3.     The conus is at L1 and the caudal aspect of the spinal cord appears  unremarkable.     L1-L2: There is no evidence of disc bulge or herniation.     L2-L3: There is no evidence of disc bulge or herniation.     L3-L4: There is no evidence of disc bulge or herniation.     L4-L5: Mild facet degenerative disease is present bilaterally. A left  paramedian disc protrusion is appreciated which results in mild  flattening of the ventral surface of the thecal sac centrally but does  contribute to mild lateral recess narrowing on the left. This may  involve the traversing left L5 nerve root. An annular tear is noted  centrally and to the left which enhances after contrast administration.  Mild foraminal stenosis is present to the left secondary to extension of  a small disc osteophyte complex into the neural foramen.     L5-S1: Moderate facet degenerative disease is present bilaterally. A  minimal central disc bulge is present.       Impression:      There is no evidence of T2 hyperintensity to suggest  discitis or edema/enhancement involving the endplates to suggest  osteomyelitis. Multilevel degenerative disease involving the lumbar  spine is noted as described above most prominent of which is at L4-L5.  There is a left paramedian disc protrusion with an associated annular  tear which enhances. This contributes to mild-to-moderate lateral recess  noted on the left which may involve the left L5 traversing nerve root.  Clinical correlation is recommended. Prominent osteophytes are noted  anterolaterally to the left at L1-L2 and L2-L3. There are multiple  adjacent mildly prominent enhancing lymph nodes. T2 hyperintensity  suggesting degenerative disease is noted involving the endplates  anterolaterally to the left at L1-L2 and to lesser extent L2-L3.  Clinical correlation  is recommended. Further evaluation could be  performed with a repeat CT examination of the abdomen and pelvis with  contrast to assess lymphadenopathy as well as with a short-term MRI  examination of the lumbar spine with and without contrast as indicated.  There is no evidence of an epidural abscess or of abnormal intradural  enhancement.        MRI EXAMINATION OF THE THORACIC SPINE WITH AND WITHOUT CONTRAST:     FINDINGS: The alignment of the thoracic spine is within normal limits.  The CT examination of the thoracic spine performed on 02/08/2023  demonstrated anterior bridging osteophyte from T6 to L1. Signal  intensity within the thoracic cord is normal on the axial T2 sequence.  Evaluation of the thoracic cord is limited on the sagittal T2 sequence  secondary to motion. There is a mild degree of increased signal  intensity involving the disc centrally on the sagittal T2 sequence at  T6/T7. There is minimal increased signal intensity involving the  inferior endplate of T6 centrally. The disc centrally at T6-7 enhances  mildly. There is minimal enhancement of the inferior endplate of T6.     A small central broad-based disc osteophyte complex is present at T6/T7  resulting in minimal flattening of the ventral surface of the thecal  sac. A small left paracentral disc osteophyte complex is present at  T7/T8 as well. At T8/T9 a slightly larger broad-based disc osteophyte  complex is present which abuts and flattens the cord centrally and to  the left of midline. Cerebral spinal fluid is attenuated ventral and  dorsal to the cord.     There is mild loss of disc height at T7/T8 and T8/T9.     IMPRESSION:  The study is hampered by patient motion. There is no evidence of focal  herniation. No convincing cord signal abnormality is identified on the  T2 sequences. Multilevel degenerative disease involving the thoracic  spine is appreciated including a broad-based disc osteophyte complexes  at T8/T9 and to a lesser extent  T6-7 and T7/T8. At T8/T9 there is mild  canal stenosis and mild flattening of the ventral surface of the cord  centrally and to the left. T2 hyperintensity is noted within the disc at  T6/T7 with minimal T2 hyperintensity and enhancement involving the  inferior endplate of T6. This is nonspecific and likely degenerative in  nature. Early discitis could not be entirely excluded but is thought to  be less likely. A short-term follow-up MRI examination of the thoracic  spine with and without contrast could be obtained as indicated. Anterior  bridging osteophyte is noted on the CT examination of the thoracic spine  from 02/08/2023 extending from T6 to L1.     This report was finalized on 2/11/2023 11:31 AM by Dr. Osvaldo Alvarado M.D.       MRI Thoracic Spine With & Without Contrast [[id number]] Collected: 02/10/23 1955     Updated: 02/11/23 1134    Narrative:      MRI EXAMINATION OF THE THORACIC AND LUMBAR SPINE WITH AND WITHOUT  CONTRAST     HISTORY: Back pain, evaluate for infection.     COMPARISON: CT lumbar and thoracic spine 02/08/2023. No prior MRI is  available for comparison.     MRI EXAMINATION OF THE LUMBAR SPINE WITH AND WITHOUT CONTRAST:     FINDINGS: The alignment of the lumbar spine is within normal limits.  There is mild disc desiccation and loss of disc height at L4-L5.  Prominent anterior osteophytes are appreciated anterior laterally at  L1-L2 and L2-L3. The sagittal T2 sequence demonstrates prominent nodes  anterolateral to the lumbar spine of the retroperitoneum from L1 to L3  which enhance after contrast administration. Prominent osteophytes are  noted anterolaterally to the left at L1-L2 and to lesser extent L2-L3.     The conus is at L1 and the caudal aspect of the spinal cord appears  unremarkable.     L1-L2: There is no evidence of disc bulge or herniation.     L2-L3: There is no evidence of disc bulge or herniation.     L3-L4: There is no evidence of disc bulge or herniation.     L4-L5: Mild  facet degenerative disease is present bilaterally. A left  paramedian disc protrusion is appreciated which results in mild  flattening of the ventral surface of the thecal sac centrally but does  contribute to mild lateral recess narrowing on the left. This may  involve the traversing left L5 nerve root. An annular tear is noted  centrally and to the left which enhances after contrast administration.  Mild foraminal stenosis is present to the left secondary to extension of  a small disc osteophyte complex into the neural foramen.     L5-S1: Moderate facet degenerative disease is present bilaterally. A  minimal central disc bulge is present.       Impression:      There is no evidence of T2 hyperintensity to suggest  discitis or edema/enhancement involving the endplates to suggest  osteomyelitis. Multilevel degenerative disease involving the lumbar  spine is noted as described above most prominent of which is at L4-L5.  There is a left paramedian disc protrusion with an associated annular  tear which enhances. This contributes to mild-to-moderate lateral recess  noted on the left which may involve the left L5 traversing nerve root.  Clinical correlation is recommended. Prominent osteophytes are noted  anterolaterally to the left at L1-L2 and L2-L3. There are multiple  adjacent mildly prominent enhancing lymph nodes. T2 hyperintensity  suggesting degenerative disease is noted involving the endplates  anterolaterally to the left at L1-L2 and to lesser extent L2-L3.  Clinical correlation is recommended. Further evaluation could be  performed with a repeat CT examination of the abdomen and pelvis with  contrast to assess lymphadenopathy as well as with a short-term MRI  examination of the lumbar spine with and without contrast as indicated.  There is no evidence of an epidural abscess or of abnormal intradural  enhancement.        MRI EXAMINATION OF THE THORACIC SPINE WITH AND WITHOUT CONTRAST:     FINDINGS: The  alignment of the thoracic spine is within normal limits.  The CT examination of the thoracic spine performed on 02/08/2023  demonstrated anterior bridging osteophyte from T6 to L1. Signal  intensity within the thoracic cord is normal on the axial T2 sequence.  Evaluation of the thoracic cord is limited on the sagittal T2 sequence  secondary to motion. There is a mild degree of increased signal  intensity involving the disc centrally on the sagittal T2 sequence at  T6/T7. There is minimal increased signal intensity involving the  inferior endplate of T6 centrally. The disc centrally at T6-7 enhances  mildly. There is minimal enhancement of the inferior endplate of T6.     A small central broad-based disc osteophyte complex is present at T6/T7  resulting in minimal flattening of the ventral surface of the thecal  sac. A small left paracentral disc osteophyte complex is present at  T7/T8 as well. At T8/T9 a slightly larger broad-based disc osteophyte  complex is present which abuts and flattens the cord centrally and to  the left of midline. Cerebral spinal fluid is attenuated ventral and  dorsal to the cord.     There is mild loss of disc height at T7/T8 and T8/T9.     IMPRESSION:  The study is hampered by patient motion. There is no evidence of focal  herniation. No convincing cord signal abnormality is identified on the  T2 sequences. Multilevel degenerative disease involving the thoracic  spine is appreciated including a broad-based disc osteophyte complexes  at T8/T9 and to a lesser extent T6-7 and T7/T8. At T8/T9 there is mild  canal stenosis and mild flattening of the ventral surface of the cord  centrally and to the left. T2 hyperintensity is noted within the disc at  T6/T7 with minimal T2 hyperintensity and enhancement involving the  inferior endplate of T6. This is nonspecific and likely degenerative in  nature. Early discitis could not be entirely excluded but is thought to  be less likely. A short-term  follow-up MRI examination of the thoracic  spine with and without contrast could be obtained as indicated. Anterior  bridging osteophyte is noted on the CT examination of the thoracic spine  from 02/08/2023 extending from T6 to L1.     This report was finalized on 2/11/2023 11:31 AM by Dr. Osvaldo Alvarado M.D.       CT Angiogram Chest [[id number]] Collected: 02/09/23 1144     Updated: 02/10/23 1028    Narrative:      CT ANGIOGRAM CHEST WITH CONTRAST, PULMONARY EMBOLISM PROTOCOL     HISTORY: 37-year-old male with history of DVT. Leukocytosis. Rule out  pulmonary embolism.     TECHNIQUE: Spiral CT images were obtained from the lung apices to the  diaphragmatic domes following administration of intravenous contrast in  the angiographic phase. Coronal, sagittal and 3-D volume rendered  reformats were then obtained.     COMPARISON: 07/07/2019     FINDINGS: The exam is limited secondary to contrast bolus timing. No  convincing filling defects are identified to suggest pulmonary artery  thromboembolism. Evaluation of the segmental vessels is somewhat  limited. The aortic root is dilated. There is also dilatation of the  ascending thoracic aorta that measures up to 4.3 cm. The descending  thoracic aorta demonstrates normal caliber. No pleural or pericardial  effusion. Small mediastinal lymph nodes are present without pathological  enlargement. The central airways are patent. No suspicious pulmonary  nodules are seen. Mosaic perfusion is demonstrated within the lung  fields. Bilateral gynecomastia is present. No pathological axillary  lymphadenopathy.       Impression:      1. Limited exam due to contrast bolus timing. No convincing evidence of  pulmonary artery thromboembolism.  2. Dilated aortic root and ascending thoracic aorta without change from  2019.     CT ABDOMEN AND PELVIS WITH CONTRAST     HISTORY: Left flank pain and leukocytosis.     TECHNIQUE: Axial CT images of the abdomen and pelvis were obtained  following  administration of intravenous contrast. The patient was not  given oral contrast Coronal and sagittal reformats were obtained.     COMPARISON: 02/08/2023     FINDINGS: The small and large bowel loops demonstrate normal caliber.  The appendix is normal. No appreciable bowel wall thickening.     The liver, gallbladder, spleen and pancreas are normal. Bilateral  adrenal glands are normal. No renal calculi or hydronephrosis. Small  retroperitoneal lymph nodes are present without pathological  enlargement. The urinary bladder is moderately distended and normal.  Multiple subcentimeter retroperitoneal lymph nodes are present and  favored to be reactive. Majority of these are located within the left  para-aortic location at the level of the kidneys. No ascites or focal  fluid collection.     IMPRESSION: No acute abnormality within the abdomen and pelvis.  Subcentimeter left para-aortic lymph nodes are favored to be reactive.     Radiation dose reduction techniques were utilized, including automated  exposure control and exposure modulation based on body size.     This report was finalized on 2/10/2023 10:25 AM by Dr. Jessa Perez M.D.       CT Abdomen Pelvis With Contrast [[id number]] Collected: 02/09/23 1144     Updated: 02/10/23 1028    Narrative:      CT ANGIOGRAM CHEST WITH CONTRAST, PULMONARY EMBOLISM PROTOCOL     HISTORY: 37-year-old male with history of DVT. Leukocytosis. Rule out  pulmonary embolism.     TECHNIQUE: Spiral CT images were obtained from the lung apices to the  diaphragmatic domes following administration of intravenous contrast in  the angiographic phase. Coronal, sagittal and 3-D volume rendered  reformats were then obtained.     COMPARISON: 07/07/2019     FINDINGS: The exam is limited secondary to contrast bolus timing. No  convincing filling defects are identified to suggest pulmonary artery  thromboembolism. Evaluation of the segmental vessels is somewhat  limited. The aortic root is dilated.  There is also dilatation of the  ascending thoracic aorta that measures up to 4.3 cm. The descending  thoracic aorta demonstrates normal caliber. No pleural or pericardial  effusion. Small mediastinal lymph nodes are present without pathological  enlargement. The central airways are patent. No suspicious pulmonary  nodules are seen. Mosaic perfusion is demonstrated within the lung  fields. Bilateral gynecomastia is present. No pathological axillary  lymphadenopathy.       Impression:      1. Limited exam due to contrast bolus timing. No convincing evidence of  pulmonary artery thromboembolism.  2. Dilated aortic root and ascending thoracic aorta without change from  2019.     CT ABDOMEN AND PELVIS WITH CONTRAST     HISTORY: Left flank pain and leukocytosis.     TECHNIQUE: Axial CT images of the abdomen and pelvis were obtained  following administration of intravenous contrast. The patient was not  given oral contrast Coronal and sagittal reformats were obtained.     COMPARISON: 02/08/2023     FINDINGS: The small and large bowel loops demonstrate normal caliber.  The appendix is normal. No appreciable bowel wall thickening.     The liver, gallbladder, spleen and pancreas are normal. Bilateral  adrenal glands are normal. No renal calculi or hydronephrosis. Small  retroperitoneal lymph nodes are present without pathological  enlargement. The urinary bladder is moderately distended and normal.  Multiple subcentimeter retroperitoneal lymph nodes are present and  favored to be reactive. Majority of these are located within the left  para-aortic location at the level of the kidneys. No ascites or focal  fluid collection.     IMPRESSION: No acute abnormality within the abdomen and pelvis.  Subcentimeter left para-aortic lymph nodes are favored to be reactive.     Radiation dose reduction techniques were utilized, including automated  exposure control and exposure modulation based on body size.     This report was finalized  on 2/10/2023 10:25 AM by Dr. Jessa Perez M.D.       CT Lumbar Spine Without Contrast [[id number]] Collected: 02/08/23 2320     Updated: 02/08/23 2342    Narrative:      CT OF THE THORACIC AND LUMBAR SPINE     HISTORY: Mid back pain     COMPARISON: None available.     TECHNIQUE: Axial CT imaging was obtained through the thoracic and lumbar  spine. Coronal and sagittal reformatted images were obtained.     FINDINGS:  THORACIC SPINE: No acute fracture or subluxation of the thoracic spine  is seen. No aggressive osseous abnormalities are identified. There is  some mild intervertebral disc space narrowing noted within the  midthoracic spine. There are also appears to be some mild DISH-like  change within the midthoracic spine. No significant canal stenosis or  neural foraminal narrowing is seen at any level. No acute abnormalities  are identified within the thorax.     Lumbar Spine: No acute fracture or subluxation of the lumbar spine is  seen. Lumbar vertebral body alignment appears within normal limits.  Intervertebral disc spaces are relatively well-maintained. There is some  mild asymmetric degenerative changes involving the right SI joint.     T12-L1: There is no significant canal stenosis or neural foraminal  narrowing.  L1-L2: There is no significant canal stenosis or neural foraminal  narrowing.  L2-L3: There is no significant canal stenosis or neural foraminal  narrowing.  L3-L4: There is some mild disc bulge, without significant canal  stenosis. There may be some minimal bilateral neural foraminal  narrowing.  L4-L5: There is diffuse disc bulge. There is moderate canal narrowing,  secondary to both disc disease and hypertrophy of the ligamentum flavum.  Patient also appears to have bilateral neural foraminal narrowing.  L5-S1: There is disc bulge, but there is no significant canal stenosis.  The patient does appear to have neural foraminal narrowing on the right.  Please see the earlier report for findings  within the abdomen and  pelvis.       Impression:      As above. Degenerative changes, most significant at L4-L5 and L5-S1.     Radiation dose reduction techniques were utilized, including automated  exposure control and exposure modulation based on body size.     This report was finalized on 2/8/2023 11:39 PM by Dr. Allegra Corrigan M.D.       CT Thoracic Spine Without Contrast [[id number]] Collected: 02/08/23 2320     Updated: 02/08/23 2342    Narrative:      CT OF THE THORACIC AND LUMBAR SPINE     HISTORY: Mid back pain     COMPARISON: None available.     TECHNIQUE: Axial CT imaging was obtained through the thoracic and lumbar  spine. Coronal and sagittal reformatted images were obtained.     FINDINGS:  THORACIC SPINE: No acute fracture or subluxation of the thoracic spine  is seen. No aggressive osseous abnormalities are identified. There is  some mild intervertebral disc space narrowing noted within the  midthoracic spine. There are also appears to be some mild DISH-like  change within the midthoracic spine. No significant canal stenosis or  neural foraminal narrowing is seen at any level. No acute abnormalities  are identified within the thorax.     Lumbar Spine: No acute fracture or subluxation of the lumbar spine is  seen. Lumbar vertebral body alignment appears within normal limits.  Intervertebral disc spaces are relatively well-maintained. There is some  mild asymmetric degenerative changes involving the right SI joint.     T12-L1: There is no significant canal stenosis or neural foraminal  narrowing.  L1-L2: There is no significant canal stenosis or neural foraminal  narrowing.  L2-L3: There is no significant canal stenosis or neural foraminal  narrowing.  L3-L4: There is some mild disc bulge, without significant canal  stenosis. There may be some minimal bilateral neural foraminal  narrowing.  L4-L5: There is diffuse disc bulge. There is moderate canal narrowing,  secondary to both disc disease and  hypertrophy of the ligamentum flavum.  Patient also appears to have bilateral neural foraminal narrowing.  L5-S1: There is disc bulge, but there is no significant canal stenosis.  The patient does appear to have neural foraminal narrowing on the right.  Please see the earlier report for findings within the abdomen and  pelvis.       Impression:      As above. Degenerative changes, most significant at L4-L5 and L5-S1.     Radiation dose reduction techniques were utilized, including automated  exposure control and exposure modulation based on body size.     This report was finalized on 2/8/2023 11:39 PM by Dr. Allegra Corrigan M.D.       CT Abdomen Pelvis Without Contrast [[id number]] Collected: 02/08/23 1945     Updated: 02/08/23 1953    Narrative:      CT ABDOMEN PELVIS WO CONTRAST-     INDICATIONS: Left flank pain     TECHNIQUE: Radiation dose reduction techniques were utilized, including  automated exposure control and exposure modulation based on body size.  Unenhanced ABDOMEN AND PELVIS CT     COMPARISON: 11/30/2019     FINDINGS:      Unremarkable unenhanced appearance of the liver, gallbladder, spleen,  adrenal glands, pancreas, kidneys, bladder.     No bowel obstruction or abnormal bowel thickening is identified. The  appendix does not appear inflamed.     No free intraperitoneal gas or free fluid. Small umbilical hernia of fat  is present.     Scattered small mesenteric and para-aortic lymph nodes are seen that are  not significant by size criteria.     Abdominal aorta is not aneurysmal.     The lung bases are clear.     Degenerative changes are seen in the spine. No acute fracture is  identified.             Impression:            1. No urolithiasis or hydronephrosis.  2. No acute inflammatory process of bowel is identified, follow up as  indications persist.     This report was finalized on 2/8/2023 7:50 PM by Dr. Estevan Man M.D.           Results for orders placed during the hospital encounter of  01/14/20    Doppler Ankle Brachial Index Single Level CAR    Interpretation Summary  · Right Conclusion: The right STEPHANI is normal. Normal digital pressures first toe.  · Left Conclusion: The left STEPHANI is normal. Normal digital pressures first second and third toes.      Pertinent Labs     Results from last 7 days   Lab Units 02/11/23  0803 02/09/23  0840 02/08/23  1758   WBC 10*3/mm3 12.38* 16.21* 17.94*   HEMOGLOBIN g/dL 16.8 16.0 16.6   PLATELETS 10*3/mm3 316 286 283     Results from last 7 days   Lab Units 02/11/23  0803 02/09/23  0840 02/08/23  1758   SODIUM mmol/L 133* 135* 135*   POTASSIUM mmol/L 4.1 4.1 4.2   CHLORIDE mmol/L 97* 100 97*   CO2 mmol/L 22.0 23.6 25.7   BUN mg/dL 26* 17 21*   CREATININE mg/dL 0.82 0.74* 0.92   GLUCOSE mg/dL 184* 154* 174*   EGFR mL/min/1.73 114.6 118.2 108.5     Results from last 7 days   Lab Units 02/08/23  1758   ALBUMIN g/dL 4.3   BILIRUBIN mg/dL 0.4   ALK PHOS U/L 140*   AST (SGOT) U/L 13   ALT (SGPT) U/L 23     Results from last 7 days   Lab Units 02/11/23  0803 02/09/23  0840 02/08/23  1758   CALCIUM mg/dL 9.2 9.3 9.5   ALBUMIN g/dL  --   --  4.3     Results from last 7 days   Lab Units 02/08/23  1758   LIPASE U/L 16     Results from last 7 days   Lab Units 02/08/23  2230 02/08/23  1758   HSTROP T ng/L 12 12   D DIMER QUANT MCGFEU/mL  --  0.50           Invalid input(s): LDLCALC  Results from last 7 days   Lab Units 02/09/23  0116 02/09/23  0108   BLOODCX  No growth at 2 days No growth at 2 days         Test Results Pending at Discharge     Pending Labs     Order Current Status    Blood Culture - Blood, Arm, Left Preliminary result    Blood Culture - Blood, Wrist, Left Preliminary result          Discharge Details        Discharge Medications      New Medications      Instructions Start Date   gabapentin 300 MG capsule  Commonly known as: NEURONTIN   300 mg, Oral, Every 8 Hours Scheduled      HYDROcodone-acetaminophen 5-325 MG per tablet  Commonly known as: NORCO   1 tablet,  Oral, Every 6 Hours PRN      lidocaine 5 %  Commonly known as: LIDODERM   1 patch, Transdermal, Every 24 Hours Scheduled, Remove & Discard patch within 12 hours or as directed by MD   Start Date: February 12, 2023     methocarbamol 750 MG tablet  Commonly known as: ROBAXIN   750 mg, Oral, 4 Times Daily         Continue These Medications      Instructions Start Date   Accu-Chek Maris Plus w/Device kit   Dx e11.9 use to check BS BID ok to substitute formulary preferred      aspirin 81 MG EC tablet   TAKE (1) TABLET DAILY      atorvastatin 20 MG tablet  Commonly known as: LIPITOR   20 mg, Oral, Daily      B-D ULTRAFINE III SHORT PEN 31G X 8 MM misc  Generic drug: Insulin Pen Needle   USE EVERY MORNING FOR INSULIN INJECTION      B-D ULTRAFINE III SHORT PEN 31G X 8 MM misc  Generic drug: Insulin Pen Needle   Use to inject insulin daily      BASAGLAR KWIKPEN 100 UNIT/ML injection pen   90 Units, Subcutaneous, Daily, INJECT 90 UNITS UNDER THE SKIN INTO THE APPROPRIATE AREA AS DIRECTED      Farxiga 10 MG tablet  Generic drug: dapagliflozin Propanediol   10 mg, Oral, Daily      glucose blood test strip   accucheck strips daily e11.9      glucose blood test strip  Commonly known as: Accu-Chek Maris Plus   Dx e11.9 use to check BS BID ok to substitute formulary preferred      True Metrix Blood Glucose Test test strip  Generic drug: glucose blood   Dx code E11.65 testing bs 1 x day      lisinopril-hydrochlorothiazide 20-25 MG per tablet  Commonly known as: PRINZIDE,ZESTORETIC   TAKE (1) TABLET DAILY      [facility] Wound/Burn Dressing Paste   APPLY AS DIRECTED      NON FORMULARY   4.25 mcg, Injection, Weekly, TRULICITY Last dose Monday, 2/6/23      tadalafil 20 MG tablet  Commonly known as: CIALIS   20 mg, Oral, As Needed      TRUEplus Lancets 28G misc   CHECK BLOOD GLUCOSE DAILY      vitamin D 1.25 MG (72601 UT) capsule capsule  Commonly known as: ERGOCALCIFEROL   50,000 Units, Oral, 2 Times Weekly             Allergies    Allergen Reactions   • Metformin Other (See Comments)     Fatigue and muscle aches       Discharge Disposition:  Home or Self Care      Discharge Diet:  Diet Order   Procedures   • Diet: Cardiac Diets, Diabetic Diets; Healthy Heart (2-3 Na+); Consistent Carbohydrate; Texture: Regular Texture (IDDSI 7); Fluid Consistency: Thin (IDDSI 0)       Discharge Activity:       CODE STATUS:    Code Status and Medical Interventions:   Ordered at: 02/08/23 2111     Code Status (Patient has no pulse and is not breathing):    CPR (Attempt to Resuscitate)     Medical Interventions (Patient has pulse or is breathing):    Full Support       No future appointments.  Additional Instructions for the Follow-ups that You Need to Schedule     CBC & Differential    Feb 16, 2023 (Approximate)      Manual Differential: No    Release to patient: Routine Release            Follow-up Information     Chuy Hays APRN .    Specialty: Nurse Practitioner  Contact information:  [address] [facility] [address] [phone]             Maru Reed APRN Follow up in 4 week(s).    Specialty: Neurosurgery  Contact information:  [address]  [facility] [address]  [facility] [id number] [phone]                         Additional Instructions for the Follow-ups that You Need to Schedule     CBC & Differential    Feb 16, 2023 (Approximate)      Manual Differential: No    Release to patient: Routine Release           Time Spent on Discharge:  Greater than 30 minutes      Cristhian Potter MD  [facility]  02/11/23  13:32 EST

## 2023-02-11 NOTE — PLAN OF CARE
Goal Outcome Evaluation:      Pt resting in bed. No c/o voiced. Slept most of the evening. Spouse at bsd. VSS no s/s of distress

## 2023-02-13 ENCOUNTER — TELEPHONE (OUTPATIENT)
Dept: NEUROSURGERY | Facility: CLINIC | Age: 38
End: 2023-02-13
Payer: MEDICAID

## 2023-02-13 NOTE — TELEPHONE ENCOUNTER
NEREYDA for patient that he has a hospital f/up appt with Dr. De Santiago on March 29 at 10:30. Also mailed appt information to patient.

## 2023-02-13 NOTE — CASE MANAGEMENT/SOCIAL WORK
Case Management Discharge Note      Final Note: Home via family, no additional CCP needs. Ed RN/CCP         Selected Continued Care - Discharged on 2/11/2023 Admission date: 2/8/2023 - Discharge disposition: Home or Self Care    Destination    No services have been selected for the patient.              Durable Medical Equipment    No services have been selected for the patient.              Dialysis/Infusion    No services have been selected for the patient.              Home Medical Care    No services have been selected for the patient.              Therapy    No services have been selected for the patient.              Community Resources    No services have been selected for the patient.              Community & DME    No services have been selected for the patient.                       Final Discharge Disposition Code: 01 - home or self-care

## 2023-02-14 LAB
BACTERIA SPEC AEROBE CULT: NORMAL
BACTERIA SPEC AEROBE CULT: NORMAL

## 2023-03-25 ENCOUNTER — APPOINTMENT (OUTPATIENT)
Dept: GENERAL RADIOLOGY | Facility: HOSPITAL | Age: 38
End: 2023-03-25
Payer: MEDICAID

## 2023-03-25 ENCOUNTER — HOSPITAL ENCOUNTER (OUTPATIENT)
Facility: HOSPITAL | Age: 38
Setting detail: OBSERVATION
Discharge: HOME | End: 2023-03-28
Attending: EMERGENCY MEDICINE | Admitting: STUDENT IN AN ORGANIZED HEALTH CARE EDUCATION/TRAINING PROGRAM
Payer: MEDICAID

## 2023-03-25 DIAGNOSIS — R79.89 ELEVATED D-DIMER: ICD-10-CM

## 2023-03-25 DIAGNOSIS — R22.42 LOCALIZED SWELLING OF LEFT LOWER EXTREMITY: Primary | ICD-10-CM

## 2023-03-25 DIAGNOSIS — Z09 FOLLOW-UP EXAM: ICD-10-CM

## 2023-03-25 DIAGNOSIS — L03.116 CELLULITIS OF LEFT LOWER EXTREMITY: ICD-10-CM

## 2023-03-25 LAB
ALBUMIN SERPL-MCNC: 3.8 G/DL (ref 3.5–5.2)
ALBUMIN/GLOB SERPL: 1.1 G/DL
ALP SERPL-CCNC: 113 U/L (ref 39–117)
ALT SERPL W P-5'-P-CCNC: 24 U/L (ref 1–41)
ANION GAP SERPL CALCULATED.3IONS-SCNC: 8 MMOL/L (ref 5–15)
AST SERPL-CCNC: 22 U/L (ref 1–40)
BASOPHILS # BLD AUTO: 0.08 10*3/MM3 (ref 0–0.2)
BASOPHILS NFR BLD AUTO: 0.6 % (ref 0–1.5)
BILIRUB SERPL-MCNC: 0.2 MG/DL (ref 0–1.2)
BUN SERPL-MCNC: 22 MG/DL (ref 6–20)
BUN/CREAT SERPL: 20.8 (ref 7–25)
CALCIUM SPEC-SCNC: 8.9 MG/DL (ref 8.6–10.5)
CHLORIDE SERPL-SCNC: 102 MMOL/L (ref 98–107)
CO2 SERPL-SCNC: 28 MMOL/L (ref 22–29)
CREAT SERPL-MCNC: 1.06 MG/DL (ref 0.76–1.27)
CRP SERPL-MCNC: 2.98 MG/DL (ref 0–0.5)
D DIMER PPP FEU-MCNC: 1.06 MCGFEU/ML (ref 0–0.5)
DEPRECATED RDW RBC AUTO: 38.9 FL (ref 37–54)
EGFRCR SERPLBLD CKD-EPI 2021: 91.6 ML/MIN/1.73
EOSINOPHIL # BLD AUTO: 0.57 10*3/MM3 (ref 0–0.4)
EOSINOPHIL NFR BLD AUTO: 4.6 % (ref 0.3–6.2)
ERYTHROCYTE [DISTWIDTH] IN BLOOD BY AUTOMATED COUNT: 12 % (ref 12.3–15.4)
ERYTHROCYTE [SEDIMENTATION RATE] IN BLOOD: 27 MM/HR (ref 0–15)
GLOBULIN UR ELPH-MCNC: 3.5 GM/DL
GLUCOSE SERPL-MCNC: 253 MG/DL (ref 65–99)
HCT VFR BLD AUTO: 43.2 % (ref 37.5–51)
HGB BLD-MCNC: 14.6 G/DL (ref 13–17.7)
HOLD SPECIMEN: NORMAL
HOLD SPECIMEN: NORMAL
IMM GRANULOCYTES # BLD AUTO: 0.06 10*3/MM3 (ref 0–0.05)
IMM GRANULOCYTES NFR BLD AUTO: 0.5 % (ref 0–0.5)
LYMPHOCYTES # BLD AUTO: 2.96 10*3/MM3 (ref 0.7–3.1)
LYMPHOCYTES NFR BLD AUTO: 23.9 % (ref 19.6–45.3)
MCH RBC QN AUTO: 29.4 PG (ref 26.6–33)
MCHC RBC AUTO-ENTMCNC: 33.8 G/DL (ref 31.5–35.7)
MCV RBC AUTO: 87.1 FL (ref 79–97)
MONOCYTES # BLD AUTO: 0.68 10*3/MM3 (ref 0.1–0.9)
MONOCYTES NFR BLD AUTO: 5.5 % (ref 5–12)
NEUTROPHILS NFR BLD AUTO: 64.9 % (ref 42.7–76)
NEUTROPHILS NFR BLD AUTO: 8.06 10*3/MM3 (ref 1.7–7)
NRBC BLD AUTO-RTO: 0 /100 WBC (ref 0–0.2)
PLATELET # BLD AUTO: 258 10*3/MM3 (ref 140–450)
PMV BLD AUTO: 10.2 FL (ref 6–12)
POTASSIUM SERPL-SCNC: 4.1 MMOL/L (ref 3.5–5.2)
PROT SERPL-MCNC: 7.3 G/DL (ref 6–8.5)
RBC # BLD AUTO: 4.96 10*6/MM3 (ref 4.14–5.8)
SODIUM SERPL-SCNC: 138 MMOL/L (ref 136–145)
WBC NRBC COR # BLD: 12.41 10*3/MM3 (ref 3.4–10.8)
WHOLE BLOOD HOLD COAG: NORMAL
WHOLE BLOOD HOLD SPECIMEN: NORMAL

## 2023-03-25 PROCEDURE — 85379 FIBRIN DEGRADATION QUANT: CPT | Performed by: PHYSICIAN ASSISTANT

## 2023-03-25 PROCEDURE — 96372 THER/PROPH/DIAG INJ SC/IM: CPT

## 2023-03-25 PROCEDURE — 96365 THER/PROPH/DIAG IV INF INIT: CPT

## 2023-03-25 PROCEDURE — 99284 EMERGENCY DEPT VISIT MOD MDM: CPT

## 2023-03-25 PROCEDURE — 73630 X-RAY EXAM OF FOOT: CPT

## 2023-03-25 PROCEDURE — 80053 COMPREHEN METABOLIC PANEL: CPT | Performed by: PHYSICIAN ASSISTANT

## 2023-03-25 PROCEDURE — 25010000002 CEFEPIME PER 500 MG: Performed by: PHYSICIAN ASSISTANT

## 2023-03-25 PROCEDURE — 85025 COMPLETE CBC W/AUTO DIFF WBC: CPT | Performed by: PHYSICIAN ASSISTANT

## 2023-03-25 PROCEDURE — 85652 RBC SED RATE AUTOMATED: CPT | Performed by: PHYSICIAN ASSISTANT

## 2023-03-25 PROCEDURE — 87040 BLOOD CULTURE FOR BACTERIA: CPT | Performed by: PHYSICIAN ASSISTANT

## 2023-03-25 PROCEDURE — 25010000002 ENOXAPARIN PER 10 MG: Performed by: PHYSICIAN ASSISTANT

## 2023-03-25 PROCEDURE — 86140 C-REACTIVE PROTEIN: CPT | Performed by: PHYSICIAN ASSISTANT

## 2023-03-25 RX ORDER — IBUPROFEN 800 MG/1
800 TABLET ORAL ONCE
Status: COMPLETED | OUTPATIENT
Start: 2023-03-25 | End: 2023-03-25

## 2023-03-25 RX ORDER — ENOXAPARIN SODIUM 100 MG/ML
1 INJECTION SUBCUTANEOUS ONCE
Status: COMPLETED | OUTPATIENT
Start: 2023-03-25 | End: 2023-03-25

## 2023-03-25 RX ORDER — SODIUM CHLORIDE 0.9 % (FLUSH) 0.9 %
10 SYRINGE (ML) INJECTION AS NEEDED
Status: DISCONTINUED | OUTPATIENT
Start: 2023-03-25 | End: 2023-03-28 | Stop reason: HOSPADM

## 2023-03-25 RX ADMIN — ENOXAPARIN SODIUM 135 MG: 100 INJECTION SUBCUTANEOUS at 23:27

## 2023-03-25 RX ADMIN — CEFEPIME 2 G: 2 INJECTION, POWDER, FOR SOLUTION INTRAVENOUS at 23:23

## 2023-03-25 RX ADMIN — IBUPROFEN 800 MG: 800 TABLET, FILM COATED ORAL at 23:25

## 2023-03-25 NOTE — ED NOTES
Was seen by his podiatrist on Thursday and told to come to ER for eval of L leg for DVT vs cellulitis. Pt reports wound to his L foot, redness and warmth to lower L leg. Pt ambulatory w/o assistance.

## 2023-03-26 ENCOUNTER — APPOINTMENT (OUTPATIENT)
Dept: CARDIOLOGY | Facility: HOSPITAL | Age: 38
End: 2023-03-26
Payer: MEDICAID

## 2023-03-26 ENCOUNTER — APPOINTMENT (OUTPATIENT)
Dept: MRI IMAGING | Facility: HOSPITAL | Age: 38
End: 2023-03-26
Payer: MEDICAID

## 2023-03-26 PROBLEM — L03.116 LEFT LEG CELLULITIS: Status: ACTIVE | Noted: 2023-03-26

## 2023-03-26 PROBLEM — L97.529 ULCER OF LEFT FOOT: Status: ACTIVE | Noted: 2023-03-26

## 2023-03-26 LAB
ANION GAP SERPL CALCULATED.3IONS-SCNC: 9 MMOL/L (ref 5–15)
BASOPHILS # BLD AUTO: 0.07 10*3/MM3 (ref 0–0.2)
BASOPHILS NFR BLD AUTO: 0.6 % (ref 0–1.5)
BH CV LOWER VASCULAR LEFT COMMON FEMORAL AUGMENT: NORMAL
BH CV LOWER VASCULAR LEFT COMMON FEMORAL COMPETENT: NORMAL
BH CV LOWER VASCULAR LEFT COMMON FEMORAL COMPRESS: NORMAL
BH CV LOWER VASCULAR LEFT COMMON FEMORAL PHASIC: NORMAL
BH CV LOWER VASCULAR LEFT COMMON FEMORAL SPONT: NORMAL
BH CV LOWER VASCULAR LEFT DISTAL FEMORAL COMPRESS: NORMAL
BH CV LOWER VASCULAR LEFT GASTRONEMIUS COMPRESS: NORMAL
BH CV LOWER VASCULAR LEFT GREATER SAPH AK COMPRESS: NORMAL
BH CV LOWER VASCULAR LEFT GREATER SAPH BK COMPRESS: NORMAL
BH CV LOWER VASCULAR LEFT LESSER SAPH COMPRESS: NORMAL
BH CV LOWER VASCULAR LEFT MID FEMORAL AUGMENT: NORMAL
BH CV LOWER VASCULAR LEFT MID FEMORAL COMPETENT: NORMAL
BH CV LOWER VASCULAR LEFT MID FEMORAL COMPRESS: NORMAL
BH CV LOWER VASCULAR LEFT MID FEMORAL PHASIC: NORMAL
BH CV LOWER VASCULAR LEFT MID FEMORAL SPONT: NORMAL
BH CV LOWER VASCULAR LEFT PERONEAL COMPRESS: NORMAL
BH CV LOWER VASCULAR LEFT POPLITEAL AUGMENT: NORMAL
BH CV LOWER VASCULAR LEFT POPLITEAL COMPETENT: NORMAL
BH CV LOWER VASCULAR LEFT POPLITEAL COMPRESS: NORMAL
BH CV LOWER VASCULAR LEFT POPLITEAL PHASIC: NORMAL
BH CV LOWER VASCULAR LEFT POPLITEAL SPONT: NORMAL
BH CV LOWER VASCULAR LEFT POSTERIOR TIBIAL COMPRESS: NORMAL
BH CV LOWER VASCULAR LEFT PROFUNDA FEMORAL COMPRESS: NORMAL
BH CV LOWER VASCULAR LEFT PROXIMAL FEMORAL COMPRESS: NORMAL
BH CV LOWER VASCULAR LEFT SAPHENOFEMORAL JUNCTION COMPRESS: NORMAL
BH CV LOWER VASCULAR RIGHT COMMON FEMORAL AUGMENT: NORMAL
BH CV LOWER VASCULAR RIGHT COMMON FEMORAL COMPETENT: NORMAL
BH CV LOWER VASCULAR RIGHT COMMON FEMORAL COMPRESS: NORMAL
BH CV LOWER VASCULAR RIGHT COMMON FEMORAL PHASIC: NORMAL
BH CV LOWER VASCULAR RIGHT COMMON FEMORAL SPONT: NORMAL
BH CV LOWER VASCULAR RIGHT DISTAL FEMORAL COMPRESS: NORMAL
BH CV LOWER VASCULAR RIGHT GASTRONEMIUS COMPRESS: NORMAL
BH CV LOWER VASCULAR RIGHT GREATER SAPH AK COMPRESS: NORMAL
BH CV LOWER VASCULAR RIGHT GREATER SAPH BK COMPRESS: NORMAL
BH CV LOWER VASCULAR RIGHT LESSER SAPH COMPRESS: NORMAL
BH CV LOWER VASCULAR RIGHT MID FEMORAL AUGMENT: NORMAL
BH CV LOWER VASCULAR RIGHT MID FEMORAL COMPETENT: NORMAL
BH CV LOWER VASCULAR RIGHT MID FEMORAL COMPRESS: NORMAL
BH CV LOWER VASCULAR RIGHT MID FEMORAL PHASIC: NORMAL
BH CV LOWER VASCULAR RIGHT MID FEMORAL SPONT: NORMAL
BH CV LOWER VASCULAR RIGHT PERONEAL COMPRESS: NORMAL
BH CV LOWER VASCULAR RIGHT POPLITEAL AUGMENT: NORMAL
BH CV LOWER VASCULAR RIGHT POPLITEAL COMPETENT: NORMAL
BH CV LOWER VASCULAR RIGHT POPLITEAL COMPRESS: NORMAL
BH CV LOWER VASCULAR RIGHT POPLITEAL PHASIC: NORMAL
BH CV LOWER VASCULAR RIGHT POPLITEAL SPONT: NORMAL
BH CV LOWER VASCULAR RIGHT POSTERIOR TIBIAL COMPRESS: NORMAL
BH CV LOWER VASCULAR RIGHT PROFUNDA FEMORAL COMPRESS: NORMAL
BH CV LOWER VASCULAR RIGHT PROXIMAL FEMORAL COMPRESS: NORMAL
BH CV LOWER VASCULAR RIGHT SAPHENOFEMORAL JUNCTION COMPRESS: NORMAL
BUN SERPL-MCNC: 22 MG/DL (ref 6–20)
BUN/CREAT SERPL: 26.2 (ref 7–25)
CALCIUM SPEC-SCNC: 8.3 MG/DL (ref 8.6–10.5)
CHLORIDE SERPL-SCNC: 104 MMOL/L (ref 98–107)
CO2 SERPL-SCNC: 24 MMOL/L (ref 22–29)
CREAT SERPL-MCNC: 0.84 MG/DL (ref 0.76–1.27)
DEPRECATED RDW RBC AUTO: 38.8 FL (ref 37–54)
EGFRCR SERPLBLD CKD-EPI 2021: 113.8 ML/MIN/1.73
EOSINOPHIL # BLD AUTO: 1.17 10*3/MM3 (ref 0–0.4)
EOSINOPHIL NFR BLD AUTO: 9.7 % (ref 0.3–6.2)
ERYTHROCYTE [DISTWIDTH] IN BLOOD BY AUTOMATED COUNT: 12.2 % (ref 12.3–15.4)
GLUCOSE BLDC GLUCOMTR-MCNC: 119 MG/DL (ref 70–130)
GLUCOSE BLDC GLUCOMTR-MCNC: 166 MG/DL (ref 70–130)
GLUCOSE BLDC GLUCOMTR-MCNC: 170 MG/DL (ref 70–130)
GLUCOSE BLDC GLUCOMTR-MCNC: 216 MG/DL (ref 70–130)
GLUCOSE BLDC GLUCOMTR-MCNC: 97 MG/DL (ref 70–130)
GLUCOSE SERPL-MCNC: 189 MG/DL (ref 65–99)
HCT VFR BLD AUTO: 38.7 % (ref 37.5–51)
HGB BLD-MCNC: 13 G/DL (ref 13–17.7)
HOLD SPECIMEN: NORMAL
IMM GRANULOCYTES # BLD AUTO: 0.05 10*3/MM3 (ref 0–0.05)
IMM GRANULOCYTES NFR BLD AUTO: 0.4 % (ref 0–0.5)
LYMPHOCYTES # BLD AUTO: 4.32 10*3/MM3 (ref 0.7–3.1)
LYMPHOCYTES NFR BLD AUTO: 35.8 % (ref 19.6–45.3)
MAXIMAL PREDICTED HEART RATE: 181 BPM
MCH RBC QN AUTO: 29.2 PG (ref 26.6–33)
MCHC RBC AUTO-ENTMCNC: 33.6 G/DL (ref 31.5–35.7)
MCV RBC AUTO: 87 FL (ref 79–97)
MONOCYTES # BLD AUTO: 0.82 10*3/MM3 (ref 0.1–0.9)
MONOCYTES NFR BLD AUTO: 6.8 % (ref 5–12)
NEUTROPHILS NFR BLD AUTO: 46.7 % (ref 42.7–76)
NEUTROPHILS NFR BLD AUTO: 5.63 10*3/MM3 (ref 1.7–7)
NRBC BLD AUTO-RTO: 0 /100 WBC (ref 0–0.2)
PLATELET # BLD AUTO: 204 10*3/MM3 (ref 140–450)
PMV BLD AUTO: 9.5 FL (ref 6–12)
POTASSIUM SERPL-SCNC: 3.5 MMOL/L (ref 3.5–5.2)
PROCALCITONIN SERPL-MCNC: 0.1 NG/ML (ref 0–0.25)
RBC # BLD AUTO: 4.45 10*6/MM3 (ref 4.14–5.8)
SODIUM SERPL-SCNC: 137 MMOL/L (ref 136–145)
STRESS TARGET HR: 154 BPM
WBC NRBC COR # BLD: 12.06 10*3/MM3 (ref 3.4–10.8)

## 2023-03-26 PROCEDURE — 80048 BASIC METABOLIC PNL TOTAL CA: CPT | Performed by: INTERNAL MEDICINE

## 2023-03-26 PROCEDURE — G0378 HOSPITAL OBSERVATION PER HR: HCPCS

## 2023-03-26 PROCEDURE — 96372 THER/PROPH/DIAG INJ SC/IM: CPT

## 2023-03-26 PROCEDURE — 82962 GLUCOSE BLOOD TEST: CPT

## 2023-03-26 PROCEDURE — 84145 PROCALCITONIN (PCT): CPT | Performed by: STUDENT IN AN ORGANIZED HEALTH CARE EDUCATION/TRAINING PROGRAM

## 2023-03-26 PROCEDURE — 25010000002 ENOXAPARIN PER 10 MG: Performed by: STUDENT IN AN ORGANIZED HEALTH CARE EDUCATION/TRAINING PROGRAM

## 2023-03-26 PROCEDURE — 36415 COLL VENOUS BLD VENIPUNCTURE: CPT | Performed by: INTERNAL MEDICINE

## 2023-03-26 PROCEDURE — 25010000002 VANCOMYCIN 10 G RECONSTITUTED SOLUTION: Performed by: PHYSICIAN ASSISTANT

## 2023-03-26 PROCEDURE — 85025 COMPLETE CBC W/AUTO DIFF WBC: CPT | Performed by: INTERNAL MEDICINE

## 2023-03-26 PROCEDURE — 93970 EXTREMITY STUDY: CPT

## 2023-03-26 RX ORDER — HYDROCODONE BITARTRATE AND ACETAMINOPHEN 5; 325 MG/1; MG/1
1 TABLET ORAL EVERY 4 HOURS PRN
Status: DISCONTINUED | OUTPATIENT
Start: 2023-03-26 | End: 2023-03-28 | Stop reason: HOSPADM

## 2023-03-26 RX ORDER — ENOXAPARIN SODIUM 100 MG/ML
1 INJECTION SUBCUTANEOUS EVERY 12 HOURS
Status: DISCONTINUED | OUTPATIENT
Start: 2023-03-26 | End: 2023-03-26

## 2023-03-26 RX ORDER — ONDANSETRON 2 MG/ML
4 INJECTION INTRAMUSCULAR; INTRAVENOUS EVERY 6 HOURS PRN
Status: DISCONTINUED | OUTPATIENT
Start: 2023-03-26 | End: 2023-03-28 | Stop reason: HOSPADM

## 2023-03-26 RX ORDER — ATORVASTATIN CALCIUM 20 MG/1
20 TABLET, FILM COATED ORAL DAILY
Status: DISCONTINUED | OUTPATIENT
Start: 2023-03-26 | End: 2023-03-28 | Stop reason: HOSPADM

## 2023-03-26 RX ORDER — GABAPENTIN 300 MG/1
300 CAPSULE ORAL 3 TIMES DAILY
Status: DISCONTINUED | OUTPATIENT
Start: 2023-03-26 | End: 2023-03-28 | Stop reason: HOSPADM

## 2023-03-26 RX ORDER — IBUPROFEN 600 MG/1
1 TABLET ORAL
Status: DISCONTINUED | OUTPATIENT
Start: 2023-03-26 | End: 2023-03-28 | Stop reason: HOSPADM

## 2023-03-26 RX ORDER — SODIUM CHLORIDE 0.9 % (FLUSH) 0.9 %
10 SYRINGE (ML) INJECTION EVERY 12 HOURS SCHEDULED
Status: DISCONTINUED | OUTPATIENT
Start: 2023-03-26 | End: 2023-03-28 | Stop reason: HOSPADM

## 2023-03-26 RX ORDER — DOXYCYCLINE 100 MG/1
100 CAPSULE ORAL EVERY 12 HOURS SCHEDULED
Status: DISCONTINUED | OUTPATIENT
Start: 2023-03-26 | End: 2023-03-26

## 2023-03-26 RX ORDER — ACETAMINOPHEN 160 MG/5ML
650 SOLUTION ORAL EVERY 4 HOURS PRN
Status: DISCONTINUED | OUTPATIENT
Start: 2023-03-26 | End: 2023-03-28 | Stop reason: HOSPADM

## 2023-03-26 RX ORDER — SODIUM CHLORIDE 9 MG/ML
40 INJECTION, SOLUTION INTRAVENOUS AS NEEDED
Status: DISCONTINUED | OUTPATIENT
Start: 2023-03-26 | End: 2023-03-28 | Stop reason: HOSPADM

## 2023-03-26 RX ORDER — CEPHALEXIN 500 MG/1
500 CAPSULE ORAL 3 TIMES DAILY
Status: DISCONTINUED | OUTPATIENT
Start: 2023-03-26 | End: 2023-03-26

## 2023-03-26 RX ORDER — INSULIN GLARGINE 100 [IU]/ML
90 INJECTION, SOLUTION SUBCUTANEOUS DAILY
COMMUNITY
Start: 2023-03-26

## 2023-03-26 RX ORDER — DEXTROSE MONOHYDRATE 25 G/50ML
25 INJECTION, SOLUTION INTRAVENOUS
Status: DISCONTINUED | OUTPATIENT
Start: 2023-03-26 | End: 2023-03-28 | Stop reason: HOSPADM

## 2023-03-26 RX ORDER — ACETAMINOPHEN 650 MG/1
650 SUPPOSITORY RECTAL EVERY 4 HOURS PRN
Status: DISCONTINUED | OUTPATIENT
Start: 2023-03-26 | End: 2023-03-28 | Stop reason: HOSPADM

## 2023-03-26 RX ORDER — SODIUM CHLORIDE 0.9 % (FLUSH) 0.9 %
10 SYRINGE (ML) INJECTION AS NEEDED
Status: DISCONTINUED | OUTPATIENT
Start: 2023-03-26 | End: 2023-03-28 | Stop reason: HOSPADM

## 2023-03-26 RX ORDER — NICOTINE POLACRILEX 4 MG
15 LOZENGE BUCCAL
Status: DISCONTINUED | OUTPATIENT
Start: 2023-03-26 | End: 2023-03-28 | Stop reason: HOSPADM

## 2023-03-26 RX ORDER — ACETAMINOPHEN 325 MG/1
650 TABLET ORAL EVERY 4 HOURS PRN
Status: DISCONTINUED | OUTPATIENT
Start: 2023-03-26 | End: 2023-03-28 | Stop reason: HOSPADM

## 2023-03-26 RX ORDER — ENOXAPARIN SODIUM 150 MG/ML
1 INJECTION SUBCUTANEOUS EVERY 12 HOURS
Status: DISCONTINUED | OUTPATIENT
Start: 2023-03-26 | End: 2023-03-26

## 2023-03-26 RX ORDER — ONDANSETRON 4 MG/1
4 TABLET, FILM COATED ORAL EVERY 6 HOURS PRN
Status: DISCONTINUED | OUTPATIENT
Start: 2023-03-26 | End: 2023-03-28 | Stop reason: HOSPADM

## 2023-03-26 RX ORDER — INSULIN LISPRO 100 [IU]/ML
0-24 INJECTION, SOLUTION INTRAVENOUS; SUBCUTANEOUS
Status: DISCONTINUED | OUTPATIENT
Start: 2023-03-26 | End: 2023-03-28 | Stop reason: HOSPADM

## 2023-03-26 RX ADMIN — VANCOMYCIN HYDROCHLORIDE 2750 MG: 10 INJECTION, POWDER, LYOPHILIZED, FOR SOLUTION INTRAVENOUS at 02:37

## 2023-03-26 RX ADMIN — GABAPENTIN 300 MG: 300 CAPSULE ORAL at 21:29

## 2023-03-26 RX ADMIN — ENOXAPARIN SODIUM 135 MG: 150 INJECTION SUBCUTANEOUS at 13:52

## 2023-03-26 RX ADMIN — CEPHALEXIN 500 MG: 500 CAPSULE ORAL at 10:15

## 2023-03-26 RX ADMIN — GABAPENTIN 300 MG: 300 CAPSULE ORAL at 10:15

## 2023-03-26 RX ADMIN — Medication 10 ML: at 02:38

## 2023-03-26 RX ADMIN — DOXYCYCLINE 100 MG: 100 CAPSULE ORAL at 10:15

## 2023-03-26 RX ADMIN — DOXYCYCLINE 100 MG: 100 CAPSULE ORAL at 02:37

## 2023-03-26 RX ADMIN — Medication 10 ML: at 21:57

## 2023-03-26 RX ADMIN — GABAPENTIN 300 MG: 300 CAPSULE ORAL at 02:22

## 2023-03-26 NOTE — H&P
Patient Name:  Osvaldo Welsh  YOB: 1985  MRN:  [id number]  Admit Date:  3/25/2023  Patient Care Team:  Chuy Hays APRN as PCP - General (Nurse Practitioner)      Subjective   History Present Illness     Chief Complaint   Patient presents with   • Leg Pain       Mr. Welsh is a 37 y.o. with a history of H/o uncontrolled diabetes, previous DVT and followed by Podiatrist Dr. Montes.  He had went to the beach over the past few weeks and while wearing crocs had an ulcer develop on his left foot.  He was seen by podiatry and did have debridement of this area as well as local wound care.  Over the past couple days he has had increased swelling and erythema of his left lower extremity.  His podiatrist recommended he come to the emergency room to be evaluated for cellulitis or DVT.  In the emergency room he did have positive D-dimer but ultrasound cannot be done.  He was given IV antibiotics.  Patient denies any fevers.    History of Present Illness  Review of Systems   Constitutional: Negative for chills and fever.   Gastrointestinal: Negative.    Genitourinary: Negative.    Skin: Positive for color change and wound.   Psychiatric/Behavioral: Negative.         Personal History     Past Medical History:   Diagnosis Date   • Asthma    • Diabetes (HCC)    • Hyperlipidemia    • Hypertension    • Optic nerve hemorrhage, left      Past Surgical History:   Procedure Laterality Date   • TONSILLECTOMY AND ADENOIDECTOMY       Family History   Problem Relation Age of Onset   • Diabetes Father    • Hypertension Maternal Grandmother    • Hypertension Maternal Grandfather    • Diabetes Paternal Grandmother    • Diabetes Paternal Grandfather      Social History     Tobacco Use   • Smoking status: Never   • Smokeless tobacco: Never   Vaping Use   • Vaping Use: Never used   Substance Use Topics   • Alcohol use: Yes     Comment: social   • Drug use: No     (Not in a hospital admission)    Allergies:    Allergies    Allergen Reactions   • Metformin Other (See Comments)     Fatigue and muscle aches       Objective    Objective     Vital Signs  Temp:  [97.6 °F (36.4 °C)] 97.6 °F (36.4 °C)  Heart Rate:  [89-99] 89  Resp:  [18] 18  BP: (110-136)/(68-78) 110/68  SpO2:  [96 %-97 %] 96 %  on   ;   Device (Oxygen Therapy): room air  Body mass index is 39.16 kg/m².    Physical Exam  Vitals and nursing note reviewed.   Constitutional:       General: He is not in acute distress.     Appearance: He is well-developed. He is obese. He is not diaphoretic.   HENT:      Head: Normocephalic and atraumatic.   Eyes:      General: No scleral icterus.     Conjunctiva/sclera: Conjunctivae normal.   Neck:      Vascular: No JVD.   Cardiovascular:      Rate and Rhythm: Normal rate.      Heart sounds: No murmur heard.  Pulmonary:      Effort: Pulmonary effort is normal. No respiratory distress.   Abdominal:      General: Bowel sounds are normal.      Palpations: Abdomen is soft.      Tenderness: There is no abdominal tenderness.   Musculoskeletal:         General: Normal range of motion.      Cervical back: Normal range of motion and neck supple.   Skin:     General: Skin is warm and dry.      Findings: Erythema (left lower extremity) present.   Neurological:      Mental Status: He is alert and oriented to person, place, and time.      Cranial Nerves: No cranial nerve deficit.      Motor: No abnormal muscle tone.   Psychiatric:         Behavior: Behavior normal.         Thought Content: Thought content normal.     ulcer left foot  The erythema is more on his shin- away from the ulcer of the foot      Results Review:  I reviewed the patient's new clinical results.  Discussed with ED provider.    Lab Results (last 24 hours)     Procedure Component Value Units Date/Time    CBC & Differential [[id number]]  (Abnormal) Collected: 03/25/23 1951    Specimen: Blood Updated: 03/25/23 2205    Narrative:      The following orders were created for panel order CBC &  Differential.  Procedure                               Abnormality         Status                     ---------                               -----------         ------                     CBC Auto Differential[[id number]]        Abnormal            Final result                 Please view results for these tests on the individual orders.    Comprehensive Metabolic Panel [[id number]]  (Abnormal) Collected: 03/25/23 1951    Specimen: Blood Updated: 03/25/23 2205     Glucose 253 mg/dL      BUN 22 mg/dL      Creatinine 1.06 mg/dL      Sodium 138 mmol/L      Potassium 4.1 mmol/L      Chloride 102 mmol/L      CO2 28.0 mmol/L      Calcium 8.9 mg/dL      Total Protein 7.3 g/dL      Albumin 3.8 g/dL      ALT (SGPT) 24 U/L      AST (SGOT) 22 U/L      Alkaline Phosphatase 113 U/L      Total Bilirubin 0.2 mg/dL      Globulin 3.5 gm/dL      A/G Ratio 1.1 g/dL      BUN/Creatinine Ratio 20.8     Anion Gap 8.0 mmol/L      eGFR 91.6 mL/min/1.73     Narrative:      GFR Normal >60  Chronic Kidney Disease <60  Kidney Failure <15      D-dimer, Quantitative [[id number]]  (Abnormal) Collected: 03/25/23 1951    Specimen: Blood Updated: 03/25/23 2209     D-Dimer, Quantitative 1.06 MCGFEU/mL     Narrative:      According to the assay 's published package insert, a normal (<0.50 MCGFEU/mL) D-dimer result in conjunction with a non-high clinical probability assessment, excludes deep vein thrombosis (DVT) and pulmonary embolism (PE) with high sensitivity.    D-dimer values increase with age and this can make VTE exclusion of an older population difficult. To address this, the [employer] College [employer], based on best available evidence and recent guidelines, recommends that clinicians use age-adjusted D-dimer thresholds in patients greater than 50 years of age with: a) a low probability of PE who do not meet all Pulmonary Embolism Rule Out Criteria, or b) in those with intermediate probability of PE.   The formula for an  "age-adjusted D-dimer cut-off is \"age/100\".  For example, a 60 year old patient would have an age-adjusted cut-off of 0.60 MCGFEU/mL and an 80 year old 0.80 MCGFEU/mL.    Sedimentation Rate [[id number]]  (Abnormal) Collected: 03/25/23 1951    Specimen: Blood Updated: 03/25/23 2209     Sed Rate 27 mm/hr     C-reactive Protein [[id number]]  (Abnormal) Collected: 03/25/23 1951    Specimen: Blood Updated: 03/25/23 2220     C-Reactive Protein 2.98 mg/dL     CBC Auto Differential [[id number]]  (Abnormal) Collected: 03/25/23 1951    Specimen: Blood Updated: 03/25/23 2205     WBC 12.41 10*3/mm3      RBC 4.96 10*6/mm3      Hemoglobin 14.6 g/dL      Hematocrit 43.2 %      MCV 87.1 fL      MCH 29.4 pg      MCHC 33.8 g/dL      RDW 12.0 %      RDW-SD 38.9 fl      MPV 10.2 fL      Platelets 258 10*3/mm3      Neutrophil % 64.9 %      Lymphocyte % 23.9 %      Monocyte % 5.5 %      Eosinophil % 4.6 %      Basophil % 0.6 %      Immature Grans % 0.5 %      Neutrophils, Absolute 8.06 10*3/mm3      Lymphocytes, Absolute 2.96 10*3/mm3      Monocytes, Absolute 0.68 10*3/mm3      Eosinophils, Absolute 0.57 10*3/mm3      Basophils, Absolute 0.08 10*3/mm3      Immature Grans, Absolute 0.06 10*3/mm3      nRBC 0.0 /100 WBC     Blood Culture - Blood, Arm, Right [[id number]] Collected: 03/25/23 2201    Specimen: Blood from Arm, Right Updated: 03/25/23 2206          Imaging Results (Last 24 Hours)     Procedure Component Value Units Date/Time    XR Foot 3+ View Left [[id number]] Collected: 03/25/23 2245     Updated: 03/25/23 2259    Narrative:      LEFT FOOT: AP, LATERAL, OBLIQUE     HISTORY: Leg pain.     COMPARISON: Left foot x-rays 04/13/2020.     FINDINGS: There is flattening of the second metatarsal head. Small  cortical tracks are present within the second third metatarsal necks and  this appears to be related to previous surgery. There is widening of the  joint spaces second MTP joint and there is narrowing of joint spaces of  the third MTP " joint with marginal spur formation. Small chronic  periarticular foci of ossification are present.     There is smooth periosteal elevation involving the second through fifth  metatarsal shafts greatest involving the lateral aspects of the proximal  second and third metatarsal shafts. There are arthritic changes at the  second and third MTP joints. Midfoot alignment appears normal. No  fracture is evident. Lateral view demonstrates chronic spurring and  ossification extending dorsal to the TMT joints. Degenerative heel spurs  are present.       Impression:      1. Since the previous x-rays approximately 3 years ago there has  developed periosteal elevation involving the proximal mid shafts of the  second through fifth metatarsals greatest involving the second and third  metatarsals. There are also progressive arthritic changes at the TMT  joints. These changes are of uncertain etiology and may be related to  previous trauma or infection, potentially active. MRI would be the best  means to further evaluate.  2. There have also developed new arthritic and postsurgical changes of  the second and third MTP joints.     This report was finalized on 3/25/2023 10:56 PM by Dr. Krish Arcos M.D.                 No orders to display        Assessment/Plan     Active Hospital Problems    Diagnosis  POA   • **Localized swelling of left lower extremity [R22.42]  Yes   • Ulcer of left foot (HCC) [L97.529]  Yes   • Left leg cellulitis [L03.116]  Unknown   • Type 2 diabetes mellitus with hyperglycemia, with long-term current use of insulin (HCC) [E11.65, Z79.4]  Not Applicable   • Essential hypertension [I10]  Yes   • Class 2 obesity without serious comorbidity with body mass index (BMI) of 38.0 to 38.9 in adult [E66.9, Z68.38]  Not Applicable     Mr. Welsh is a 37 y.o. with a history of H/o uncontrolled diabetes, previous DVT and followed by Podiatrist Dr. Montes.  He had went to the beach over the past few weeks and while  wearing crocs had an ulcer develop on his left foot.  He was seen by podiatry and did have debridement of this area as well as local wound care.  Over the past couple days he has had increased swelling and erythema of his left lower extremity.  His podiatrist recommended he come to the emergency room to be evaluated for cellulitis or DVT.  In the emergency room he did have positive D-dimer but ultrasound cannot be done.  He was given IV antibiotics.  Patient denies any fevers.    · He was given IV antibiotics in the emergency room.  I do think he probably does have cellulitis though I think could be treated with oral antibiotics in particular p.o. Keflex and oral doxycycline.  It does not appear to have a deep foot infection regarding his ulcer and the cellulitis is more on his shin.  I think he can follow back up with his podiatrist for continued wound healing and monitoring of his ulcer.  · With his previous history of DVT as well as his recent travel history and positive D-dimer I do think lower extremity Doppler is necessary.  This apparently cannot be done this evening in the emergency room so will admit as observation and will obtain in the morning.  We will give Lovenox for the time being.  · We will have on insulin and monitor blood glucose and adjustment as needed.  I did tell him that it is important to have good diabetes control to decrease risk of recurrent issues with infection as well as other complications of diabetes.  · I discussed the patients findings and my recommendations with patient, family and nursing staff.    VTE Prophylaxis - Pharmacy to dose Lovenox.         Sergey Lund MD  [facility]  03/25/23  23:58 EDT    Pt was seen before midnight but due to patient care issues this note was completed after midnight.

## 2023-03-26 NOTE — ED PROVIDER NOTES
The CHELSEA and I have discussed this patient's history, physical exam and treatment plan.  I provided a substantive portion of the care of this patient.  I have reviewed the documentation and personally had a face to face interaction with the patient and personally performed the physical exam, in its entirety.  I affirm the documentation and agree with the treatment and plan.  The following describes my personal findings.      The patient presents complaining of left leg pain and swelling worsening over the past week.  Patient with a wound to his left lateral foot, followed by podiatry with worsening pain, redness, patient denies fever, vomiting, chest pain, shortness of air.      Comprehensive Physical exam:  Patient is nontoxic appearing oriented, conversant awake, alert  HEENT: normocephalic, atraumatic  Neck: No JVD, no goiter, no pain with ROM  Pulmonary: Nontachypneic, breath sounds heard well bilaterally  cardiovascular: Nontachycardic  Abdomen: Soft, nontender  musculoskeletal: Left lower extremity with 2+ edema, posterior tibial pulses and dorsalis pedis pulses palpable, cap refill intact all toes, bandage wound to lateral distal aspect of the foot with mild surrounding erythema tracking onto the lower leg.  Patient reports tenderness tracking onto the calf and medial thigh  Neuro/psychiatric:calm, appropriate, cooperative  Skin:warm, dry    Anticipate admission for treatment of cellulitis, evaluation for possible DVT, further evaluation and treatment.    Patient was wearing facemask when I entered the room and throughout our encounter. Full protective equipment was worn throughout this patient encounter including a face mask, eye protection and gloves. Hand hygiene was performed before donning protective equipment and after removal when leaving the room.           Marii Abrams MD  03/26/23 0030

## 2023-03-26 NOTE — ED NOTES
"Nursing report ED to floor  Osvaldo Welsh  37 y.o.  male    HPI :   Chief Complaint   Patient presents with    Leg Pain       Admitting doctor:   Sergey Lund MD    Admitting diagnosis:   The primary encounter diagnosis was Localized swelling of left lower extremity. Diagnoses of Cellulitis of left lower extremity and Elevated d-dimer were also pertinent to this visit.    Code status:   Current Code Status       Date Active Code Status Order ID Comments User Context       Prior            Allergies:   Metformin    Isolation:   No active isolations    Intake and Output  No intake or output data in the 24 hours ending 03/26/23 0005    Weight:       03/25/23 1924   Weight: (!) 138 kg (305 lb)       Most recent vitals:   Vitals:    03/25/23 1924 03/25/23 2335   BP: 136/78 110/68   Pulse: 99 89   Resp: 18    Temp: 97.6 °F (36.4 °C)    SpO2: 97% 96%   Weight: (!) 138 kg (305 lb)    Height: 188 cm (74\")        Active LDAs/IV Access:   Lines, Drains & Airways       Active LDAs       Name Placement date Placement time Site Days    Peripheral IV 03/25/23 2314 Anterior;Proximal;Right Forearm 03/25/23 2314  Forearm  less than 1                    Labs (abnormal labs have a star):   Labs Reviewed   COMPREHENSIVE METABOLIC PANEL - Abnormal; Notable for the following components:       Result Value    Glucose 253 (*)     BUN 22 (*)     All other components within normal limits    Narrative:     GFR Normal >60  Chronic Kidney Disease <60  Kidney Failure <15     D-DIMER, QUANTITATIVE - Abnormal; Notable for the following components:    D-Dimer, Quantitative 1.06 (*)     All other components within normal limits    Narrative:     According to the assay 's published package insert, a normal (<0.50 MCGFEU/mL) D-dimer result in conjunction with a non-high clinical probability assessment, excludes deep vein thrombosis (DVT) and pulmonary embolism (PE) with high sensitivity.    D-dimer values increase with age and " "this can make VTE exclusion of an older population difficult. To address this, the [employer], based on best available evidence and recent guidelines, recommends that clinicians use age-adjusted D-dimer thresholds in patients greater than 50 years of age with: a) a low probability of PE who do not meet all Pulmonary Embolism Rule Out Criteria, or b) in those with intermediate probability of PE.   The formula for an age-adjusted D-dimer cut-off is \"age/100\".  For example, a 60 year old patient would have an age-adjusted cut-off of 0.60 MCGFEU/mL and an 80 year old 0.80 MCGFEU/mL.   SEDIMENTATION RATE - Abnormal; Notable for the following components:    Sed Rate 27 (*)     All other components within normal limits   C-REACTIVE PROTEIN - Abnormal; Notable for the following components:    C-Reactive Protein 2.98 (*)     All other components within normal limits   CBC WITH AUTO DIFFERENTIAL - Abnormal; Notable for the following components:    WBC 12.41 (*)     RDW 12.0 (*)     Neutrophils, Absolute 8.06 (*)     Eosinophils, Absolute 0.57 (*)     Immature Grans, Absolute 0.06 (*)     All other components within normal limits   BLOOD CULTURE   BLOOD CULTURE   RAINBOW DRAW    Narrative:     The following orders were created for panel order [place] Draw.  Procedure                               Abnormality         Status                     ---------                               -----------         ------                     Green Top (Gel)[[id number]]                                  Final result               Lavender Top[[id number]]                                     Final result               Gold Top - SST[[id number]]                                   Final result               Nelson Top[[id number]]                                         Final result               Light Blue Top[[id number]]                                   Final result                 Please view results for these tests on the " individual orders.   GREEN TOP   LAVENDER TOP   GOLD TOP - SST   GRAY TOP   LIGHT BLUE TOP   CBC AND DIFFERENTIAL    Narrative:     The following orders were created for panel order CBC & Differential.  Procedure                               Abnormality         Status                     ---------                               -----------         ------                     CBC Auto Differential[[id number]]        Abnormal            Final result                 Please view results for these tests on the individual orders.       EKG:   No orders to display       Meds given in ED:   Medications   sodium chloride 0.9 % flush 10 mL (has no administration in time range)   vancomycin 2750 mg/1000 mL 0.9% NS IVPB (has no administration in time range)   ibuprofen (ADVIL,MOTRIN) tablet 800 mg (800 mg Oral Given 3/25/23 2325)   Enoxaparin Sodium (LOVENOX) syringe 135 mg (135 mg Subcutaneous Given 3/25/23 2327)   cefepime 2 gm IVPB in 100 ml NS (VTB) (2 g Intravenous New Bag 3/25/23 2323)       Imaging results:  XR Foot 3+ View Left    Result Date: 3/25/2023  1. Since the previous x-rays approximately 3 years ago there has developed periosteal elevation involving the proximal mid shafts of the second through fifth metatarsals greatest involving the second and third metatarsals. There are also progressive arthritic changes at the TMT joints. These changes are of uncertain etiology and may be related to previous trauma or infection, potentially active. MRI would be the best means to further evaluate. 2. There have also developed new arthritic and postsurgical changes of the second and third MTP joints.  This report was finalized on 3/25/2023 10:56 PM by Dr. Krish Arcos M.D.       Ambulatory status:   - Assist x 1    Social issues:   Social History     Socioeconomic History    Marital status: Single   Tobacco Use    Smoking status: Never    Smokeless tobacco: Never   Vaping Use    Vaping Use: Never used   Substance and  Sexual Activity    Alcohol use: Yes     Comment: social    Drug use: No    Sexual activity: Defer       NIH Stroke Scale:         Bora Melton RN  03/26/23 00:05 EDT

## 2023-03-26 NOTE — PLAN OF CARE
Goal Outcome Evaluation:   Patient is resting well at this time and denies any severe pain concerning his left leg or foot wound. VSS, he is tolerating both PO and IV antibiotics without any adverse side effects. Nursing will continue to monitor for healing of left under great toe calloused area.

## 2023-03-26 NOTE — ED PROVIDER NOTES
EMERGENCY DEPARTMENT ENCOUNTER    Room Number:  E660/1  Date seen:  3/27/2023  PCP: Chuy Hays APRN      HPI:  Chief Complaint: Left lower extremity pain and swelling  A complete HPI/ROS/PMH/PSH/SH/FH are unobtainable due to: Nothing  Context: Osvaldo Welsh is a 37 y.o. male who presents to the ED c/o left lower extremity pain and swelling.  Per the patient the symptoms been ongoing for approximately a week.  He states he is diabetic and he developed an ulcer on the lateral aspect of the left foot.  Over the course of the past few days the leg has began to significantly swell.  It is red and warm to the touch.  Patient is being followed by Dr. Francisco Montes, podiatrist.  He was seen earlier today and sent to the emergency department for further evaluation.  Patient has a PMH of DVT in the right lower extremity that was successfully treated with anticoagulants.  He was removed from the anticoagulants to the resolve of the clot as it was his first.  He is currently taking 325 mg ASA daily.  Patient is also diabetic and insulin-dependent.  Last A1c was said to be 8.5.    Patient denies chest pain, shortness of breath, fever, chills.  He states his lower extremity is moderately painful but this is chronic.  He is here for further evaluation.          PAST MEDICAL HISTORY  Active Ambulatory Problems     Diagnosis Date Noted   • Closed displaced fracture of navicular bone of left foot 08/02/2016   • Sprain 08/02/2016   • Closed nondisplaced fracture of cuboid bone of right foot 08/15/2016   • Sprain of right ankle 09/08/2016   • Arthritis of ankle 03/03/2017   • Peroneal tendonitis 03/03/2017   • Ankle impingement syndrome 03/03/2017   • Left ankle pain 03/03/2017   • Tendonitis, Achilles, left 03/27/2017   • Type 2 diabetes mellitus with hyperglycemia, with long-term current use of insulin (HCC) 12/20/2017   • Essential hypertension 12/20/2017   • Class 2 obesity without serious comorbidity with body mass index  (BMI) of 38.0 to 38.9 in adult 12/20/2017   • Diabetic retinopathy ([facility]) 03/21/2018   • Injury of peroneal tendon of right foot 01/31/2019   • Closed nondisplaced fracture of third metatarsal bone of right foot 01/31/2019   • Peroneal tendonitis, left 02/14/2019   • Plantar fasciitis 02/14/2019   • Bone marrow edema 02/14/2019   • Peroneal tendon tear, right, subsequent encounter 07/18/2019   • Nontraumatic tear of plantar fascia 07/18/2019   • Calcific Achilles tendonitis 07/18/2019   • Acute deep vein thrombosis (DVT) of distal vein of right lower extremity ([facility]) 07/18/2019   • Ulcer of left foot, with fat layer exposed ([facility]) 07/18/2019   • Dyslipidemia 10/17/2019   • Vitamin D deficiency disease 10/17/2019   • Erectile disorder, acquired, situational, severe 04/07/2020   • Abdominal pain 02/08/2023   • Left low back pain 02/09/2023   • Left sided abdominal pain 02/10/2023   • DDD (degenerative disc disease), lumbar 02/11/2023     Resolved Ambulatory Problems     Diagnosis Date Noted   • No Resolved Ambulatory Problems     Past Medical History:   Diagnosis Date   • Asthma    • Diabetes ([facility])    • Hyperlipidemia    • Hypertension    • Optic nerve hemorrhage, left          PAST SURGICAL HISTORY  Past Surgical History:   Procedure Laterality Date   • TONSILLECTOMY AND ADENOIDECTOMY           FAMILY HISTORY  Family History   Problem Relation Age of Onset   • Diabetes Father    • Hypertension Maternal Grandmother    • Hypertension Maternal Grandfather    • Diabetes Paternal Grandmother    • Diabetes Paternal Grandfather          SOCIAL HISTORY  Social History     Socioeconomic History   • Marital status: Single   Tobacco Use   • Smoking status: Never   • Smokeless tobacco: Never   Vaping Use   • Vaping Use: Never used   Substance and Sexual Activity   • Alcohol use: Yes     Comment: social   • Drug use: No   • Sexual activity: Defer         ALLERGIES  Metformin        REVIEW OF SYSTEMS  Review of Systems     All  systems reviewed and negative except for those discussed in HPI.       PHYSICAL EXAM  ED Triage Vitals [03/25/23 1924]   Temp Heart Rate Resp BP SpO2   97.6 °F (36.4 °C) 99 18 136/78 97 %      Temp src Heart Rate Source Patient Position BP Location FiO2 (%)   -- -- -- -- --       Physical Exam      GENERAL: Very pleasant, nontoxic, does appear slightly comfortable  HENT: nares patent  EYES: no scleral icterus  CV: regular rhythm, normal rate  RESPIRATORY: normal effort  ABDOMEN: soft  MUSCULOSKELETAL: Left lower extremity swelling  NEURO: alert, moves all extremities, follows commands  PSYCH:  calm, cooperative  SKIN:  Significant circumferential swelling and erythema that encompasses the left forefoot and extends proximally just below the knee.  There is a 3 cm superficial ulcer on the lateral aspect of the left foot in the vicinity of the fifth MTP joint.    Vital signs and nursing notes reviewed.          LAB RESULTS  Recent Results (from the past 24 hour(s))   POC Glucose Once    Collection Time: 03/26/23 11:19 AM    Specimen: Blood   Result Value Ref Range    Glucose 97 70 - 130 mg/dL   Duplex Venous Lower Extremity - Bilateral CAR    Collection Time: 03/26/23  1:20 PM   Result Value Ref Range    Target HR (85%) 154 bpm    Max. Pred. HR (100%) 181 bpm    Right Common Femoral Spont Y     Right Common Femoral Competent Y     Right Common Femoral Phasic Y     Right Common Femoral Compress C     Right Common Femoral Augment Y     Right Saphenofemoral Junction Compress C     Right Profunda Femoral Compress C     Right Proximal Femoral Compress C     Right Mid Femoral Spont Y     Right Mid Femoral Competent Y     Right Mid Femoral Phasic Y     Right Mid Femoral Compress C     Right Mid Femoral Augment Y     Right Distal Femoral Compress C     Right Popliteal Spont Y     Right Popliteal Competent Y     Right Popliteal Phasic Y     Right Popliteal Compress C     Right Popliteal Augment Y     Right Posterior Tibial  Compress C     Right Peroneal Compress C     Right Gastronemius Compress C     Right Greater Saph AK Compress C     Right Greater Saph BK Compress C     Right Lesser Saph Compress C     Left Common Femoral Spont Y     Left Common Femoral Competent Y     Left Common Femoral Phasic Y     Left Common Femoral Compress C     Left Common Femoral Augment Y     Left Saphenofemoral Junction Compress C     Left Profunda Femoral Compress C     Left Proximal Femoral Compress C     Left Mid Femoral Spont Y     Left Mid Femoral Competent Y     Left Mid Femoral Phasic Y     Left Mid Femoral Compress C     Left Mid Femoral Augment Y     Left Distal Femoral Compress C     Left Popliteal Spont Y     Left Popliteal Competent Y     Left Popliteal Phasic Y     Left Popliteal Compress C     Left Popliteal Augment Y     Left Posterior Tibial Compress C     Left Peroneal Compress C     Left Gastronemius Compress C     Left Greater Saph AK Compress C     Left Greater Saph BK Compress C     Left Lesser Saph Compress C    POC Glucose Once    Collection Time: 03/26/23  4:05 PM    Specimen: Blood   Result Value Ref Range    Glucose 170 (H) 70 - 130 mg/dL   POC Glucose Once    Collection Time: 03/26/23  9:13 PM    Specimen: Blood   Result Value Ref Range    Glucose 216 (H) 70 - 130 mg/dL   POC Glucose Once    Collection Time: 03/27/23  5:50 AM    Specimen: Blood   Result Value Ref Range    Glucose 170 (H) 70 - 130 mg/dL       Ordered the above labs and reviewed the results.        RADIOLOGY  Duplex Venous Lower Extremity - Bilateral CAR    Result Date: 3/26/2023  •  Normal bilateral lower extremity venous duplex scan. •  Largest right lymph node measuring 1.5 cm in maximum dimension.  Largest left lymph node measuring 3.7 cm in greatest dimension.       Ordered the above noted radiological studies. Reviewed by me in PACS.          PROCEDURES  Procedures          MEDICATIONS GIVEN IN ER  Medications   sodium chloride 0.9 % flush 10 mL (has no  administration in time range)   dextrose (GLUTOSE) oral gel 15 g (has no administration in time range)   dextrose (D50W) (25 g/50 mL) IV injection 25 g (has no administration in time range)   glucagon (GLUCAGEN) injection 1 mg (has no administration in time range)   insulin lispro (ADMELOG) injection 0-24 Units (0 Units Subcutaneous Not Given 3/26/23 1847)   gabapentin (NEURONTIN) capsule 300 mg (300 mg Oral Given 3/26/23 2129)   sodium chloride 0.9 % flush 10 mL (10 mL Intravenous Given 3/26/23 2157)   sodium chloride 0.9 % flush 10 mL (has no administration in time range)   sodium chloride 0.9 % infusion 40 mL (has no administration in time range)   ondansetron (ZOFRAN) tablet 4 mg (has no administration in time range)     Or   ondansetron (ZOFRAN) injection 4 mg (has no administration in time range)   acetaminophen (TYLENOL) tablet 650 mg (has no administration in time range)     Or   acetaminophen (TYLENOL) 160 MG/5ML solution 650 mg (has no administration in time range)     Or   acetaminophen (TYLENOL) suppository 650 mg (has no administration in time range)   HYDROcodone-acetaminophen (NORCO) 5-325 MG per tablet 1 tablet (has no administration in time range)   atorvastatin (LIPITOR) tablet 20 mg (has no administration in time range)   ibuprofen (ADVIL,MOTRIN) tablet 800 mg (800 mg Oral Given 3/25/23 2325)   Enoxaparin Sodium (LOVENOX) syringe 135 mg (135 mg Subcutaneous Given 3/25/23 2327)   vancomycin 2750 mg/1000 mL 0.9% NS IVPB (2,750 mg Intravenous Given 3/26/23 0237)   cefepime 2 gm IVPB in 100 ml NS (VTB) (0 g Intravenous Stopped 3/26/23 0005)         MEDICAL DECISION MAKING, PROGRESS, and CONSULTS    Discussion below represents my analysis of pertinent findings related to patient's condition, differential diagnosis, treatment plan and final disposition.      Orders placed during this visit:  Orders Placed This Encounter   Procedures   • Blood Culture - Blood,   • Blood Culture - Blood,   • XR Foot 3+  View Left   • MRI Foot Left With & Without Contrast   • [place] Draw   • Comprehensive Metabolic Panel   • D-dimer, Quantitative   • Sedimentation Rate   • C-reactive Protein   • CBC Auto Differential   • Basic Metabolic Panel   • CBC Auto Differential   • Procalcitonin   • Diet: Diabetic Diets; Consistent Carbohydrate; Texture: Regular Texture (IDDSI 7); Fluid Consistency: Thin (IDDSI 0)   • Cardiac Monitoring   • Do NOT Hold Basal or Correction Scale Insulin When Patient is NPO, Hold Scheduled Mealtime (Bolus) Insulin if NPO   • Vital Signs   • Intake & Output   • Weigh patient   • Oral Care   • Saline Lock & Maintain IV Access   • Please remove patient's pants for thorough evaluation  Misc Nursing Order (Specify)   • Code Status and Medical Interventions:   • [facility] (on-call MD unless specified) Details   • Oxygen Therapy- Nasal Cannula; Titrate for SPO2: 90% - 95%   • POC Glucose TID AC   • POC Glucose Once   • POC Glucose Once   • POC Glucose Once   • POC Glucose Once   • POC Glucose Once   • POC Glucose Once   • SCANNED - TELEMETRY     • SCANNED - TELEMETRY     • Consult to Wound / Ostomy Care   • Insert peripheral IV   • Insert Peripheral IV   • Initiate Observation Status   • Green Top (Gel)   • Lavender Top   • Gold Top - SST   • Gray Top   • Light Blue Top   • CBC & Differential         Additional sources:  - Discussed/obtained information from independent historians: None available  Additional information was obtained to confirm the patient's history.    - External (non-ED) record review: Patient was admitted to the hospital 2/8/2023 through 2/11/2023 for flank and abdominal pain reviewed a discharge note from Dr. Potter on 2/11/2023.  Patient was admitted for acute lower back pain.  An attempt to treat the pain was attempted in the emergency department unsuccessfully.  Patient was placed in the ER observation unit.  They were unable to get the patient comfortable and he had persistent leukocytosis and  elevated inflammatory markers.  Neurosurgery became involved and an MRI was ordered which revealed no infection.  He improved slowly with symptomatic treatment and was discharged to follow-up with his family physician.            - Chronic or social conditions impacting care: Patient is diabetic      Differential diagnosis:    LLE cellulitis, DVT, vasculitis, osteomyelitis, lymphedema, venous insufficiency.  Picture concerning for cellulitis and DVT.  Suspect the nidus of cellulitis of the on the lateral aspect of the left foot secondary to an ulcer.  Ulcer is said to have been there for few weeks time.  We will go ahead and obtain CBC, CMP, sed rate, CRP, x-ray of the left foot, D-dimer, blood cultures, wound culture to initiate evaluation.    Given exam findings the patient's diabetes, I anticipate admission.        Independent interpretation of labs, radiology studies, and discussions with consultants:  ED Course as of 03/27/23 0624   Sat Mar 25, 2023   2142 BP: 136/78 [RC]   2143 Temp: 97.6 °F (36.4 °C) [RC]   2143 Heart Rate: 99 [RC]   2143 SpO2: 97 %  RA [RC]   2145 Given the significant swelling of the leg, the likelihood of cellulitis, and the fact that the patient's D-dimer is elevated and I cannot rule out a DVT, I feel this patient would benefit from admission to the hospital for further evaluation, IV antibiotics, and more venous Doppler can be obtained in the morning.  I discussed the patient's case with Dr. Lund who accepts the patient at this time in a telemetry bed. [RC]   2318 WBC(!): 12.41 [RC]   2318 RBC: 4.96 [RC]   2318 Hematocrit: 43.2 [RC]   2318 Platelets: 258 [RC]   2318 Glucose(!): 253 [RC]   2318 BUN(!): 22 [RC]   2318 Creatinine: 1.06 [RC]   2318 Sodium: 138 [RC]   2318 Potassium: 4.1 [RC]   2318 CO2: 28.0 [RC]   2318 Anion Gap: 8.0 [RC]   2318 D-Dimer, Quant(!): 1.06 [RC]   2318 Sed Rate(!): 27 [RC]   2318 C-Reactive Protein(!): 2.98 [RC]      ED Course User Index  [RC] Maximus  PATT Alcantar III               PPE: The patient wore a mask throughout the entire encounter. I wore a well-fitting mask.    DIAGNOSIS  Final diagnoses:   Localized swelling of left lower extremity   Cellulitis of left lower extremity   Elevated d-dimer         DISPOSITION  DISCHARGE    Patient discharged in stable condition.    Reviewed implications of results, diagnosis, meds, responsibility to follow up, warning signs and symptoms of possible worsening, potential complications and reasons to return to ER.    Patient/Family voiced understanding of above instructions.    Discussed plan for discharge, as there is no emergent indication for admission. Patient referred to primary care provider for BP management due to today's BP. Pt/family is agreeable and understands need for follow up and repeat testing.  Pt is aware that discharge does not mean that nothing is wrong but it indicates no emergency is present that requires admission and they must continue care with follow-up as given below or physician of their choice.     FOLLOW-UP  No follow-up provider specified.       Medication List      No changes were made to your prescriptions during this visit.           Latest Documented Vital Signs:  As of 06:24 EDT  BP- 114/73 HR- 90 Temp- 98.6 °F (37 °C) (Oral) O2 sat- 96%      --    Please note that portions of this were completed with a voice recognition program.       Note Disclaimer: At [facility], we believe that sharing information builds trust and better relationships. You are receiving this note because you are receiving care at [facility] or recently visited. It is possible you will see health information before a provider has talked with you about it. This kind of information can be easy to misunderstand. To help you fully understand what it means for your health, we urge you to discuss this note with your provider.       Víctor Stroud III, PA  03/27/23 1338

## 2023-03-26 NOTE — PROGRESS NOTES
Name: Osvaldo Welsh ADMIT: 3/25/2023   : 1985  PCP: Chuy Hays APRN    MRN: [id number] LOS: 0 days   AGE/SEX: 37 y.o. male  ROOM: [facility]     Subjective   Subjective   Patient seen after Dopplers.  Patient without any fevers or chills.  Patient without any nausea, vomiting, diarrhea.  Discussed the need for MRI to rule out osteomyelitis.    Review of Systems  As above     Objective   Objective   Vital Signs  Temp:  [97.4 °F (36.3 °C)-98 °F (36.7 °C)] 97.6 °F (36.4 °C)  Heart Rate:  [73-99] 85  Resp:  [18] 18  BP: (109-136)/(68-97) 129/97  SpO2:  [96 %-99 %] 96 %  on   ;   Device (Oxygen Therapy): room air  Body mass index is 39.89 kg/m².  Physical Exam  Constitutional:       Appearance: He is obese.   HENT:      Head: Normocephalic and atraumatic.   Eyes:      Extraocular Movements: Extraocular movements intact.      Conjunctiva/sclera: Conjunctivae normal.   Cardiovascular:      Rate and Rhythm: Normal rate and regular rhythm.      Pulses: Normal pulses.      Heart sounds: No murmur heard.    No gallop.   Pulmonary:      Effort: Pulmonary effort is normal. No respiratory distress.      Breath sounds: No wheezing.   Abdominal:      General: Abdomen is flat. Bowel sounds are normal. There is no distension.      Palpations: Abdomen is soft.   Musculoskeletal:         General: No swelling or deformity.   Skin:     General: Skin is warm and dry.      Comments: Left forefoot ulcer.  Some erythema in the distal leg not near the ulcer.  Tenderness to palpation, edema   Neurological:      General: No focal deficit present.      Mental Status: He is alert. Mental status is at baseline.         Results Review     I reviewed the patient's new clinical results.  Results from last 7 days   Lab Units 23  0557 23   WBC 10*3/mm3 12.06* 12.41*   HEMOGLOBIN g/dL 13.0 14.6   PLATELETS 10*3/mm3 204 258     Results from last 7 days   Lab Units 23  0557 23   SODIUM mmol/L 137 138    POTASSIUM mmol/L 3.5 4.1   CHLORIDE mmol/L 104 102   CO2 mmol/L 24.0 28.0   BUN mg/dL 22* 22*   CREATININE mg/dL 0.84 1.06   GLUCOSE mg/dL 189* 253*   Estimated Creatinine Clearance: 177 mL/min (by C-G formula based on SCr of 0.84 mg/dL).  Results from last 7 days   Lab Units 03/25/23 1951   ALBUMIN g/dL 3.8   BILIRUBIN mg/dL 0.2   ALK PHOS U/L 113   AST (SGOT) U/L 22   ALT (SGPT) U/L 24     Results from last 7 days   Lab Units 03/26/23  0557 03/25/23 1951   CALCIUM mg/dL 8.3* 8.9   ALBUMIN g/dL  --  3.8     Results from last 7 days   Lab Units 03/26/23  0557   PROCALCITONIN ng/mL 0.10   No results found for: COVID19  Glucose   Date/Time Value Ref Range Status   03/26/2023 1119 97 70 - 130 mg/dL Final     Comment:     Meter: [id number] : [id number] Jovi BURR   03/26/2023 0615 166 (H) 70 - 130 mg/dL Final     Comment:     Meter: [id number] : [id number] Jonny BURR   03/26/2023 0122 119 70 - 130 mg/dL Final     Comment:     Meter: [id number] : [id number] Jonny BURR       Duplex Venous Lower Extremity - Bilateral CAR  •  Normal bilateral lower extremity venous duplex scan.  •  Largest right lymph node measuring 1.5 cm in maximum dimension.    Largest left lymph node measuring 3.7 cm in greatest dimension.    I reviewed the patient's daily medications.  Scheduled Medications  cephalexin, 500 mg, Oral, TID  doxycycline, 100 mg, Oral, Q12H  enoxaparin, 1 mg/kg, Subcutaneous, Q12H  gabapentin, 300 mg, Oral, TID  insulin lispro, 0-24 Units, Subcutaneous, TID AC  sodium chloride, 10 mL, Intravenous, Q12H    Infusions   Diet  Diet: Diabetic Diets; Consistent Carbohydrate; Texture: Regular Texture (IDDSI 7); Fluid Consistency: Thin (IDDSI 0)         I have personally reviewed:  [x]  Laboratory   [x]  Microbiology   [x]  Radiology   []  EKG/Telemetry   []  Cardiology/Vascular   []  Pathology   [x]  Records     Assessment/Plan     Active Hospital Problems    Diagnosis  POA   • **Localized swelling of  left lower extremity [R22.42]  Yes   • Ulcer of left foot (HCC) [L97.529]  Yes   • Left leg cellulitis [L03.116]  Unknown   • Type 2 diabetes mellitus with hyperglycemia, with long-term current use of insulin (HCC) [E11.65, Z79.4]  Not Applicable   • Essential hypertension [I10]  Yes   • Class 2 obesity without serious comorbidity with body mass index (BMI) of 38.0 to 38.9 in adult [E66.9, Z68.38]  Not Applicable      Resolved Hospital Problems   No resolved problems to display.       37 y.o. male admitted with Localized swelling of left lower extremity.    Left foot ulcer  Nonpurulent cellulitis  Left leg swelling  -White blood cell count: 12, procalcitonin 0.1  -Blood cultures pending  -D-dimer elevated, Doppler negative for DVT, discontinue further Lovenox  -X-ray with concern for deeper infection, will get MRI to rule out osteomyelitis  -Hold further antibiotics until osteomyelitis is ruled out just in case a bone biopsy is needed  -Follows with Dr. Montes, podiatrist, outpatient    Diabetes type 2  -Sliding-scale insulin for now  -Hold home diabetes medications    Hyperlipidemia-continue statin    Obesity    · Lovenox 40 mg SC daily for DVT prophylaxis.  · Full code.  · Discussed with patient and nursing staff.  Anticipate discharge home tomorrow.  If MRI is okay    Elvin Jon MD  [facility]  03/26/23  14:51 EDT        -

## 2023-03-27 ENCOUNTER — APPOINTMENT (OUTPATIENT)
Dept: MRI IMAGING | Facility: HOSPITAL | Age: 38
End: 2023-03-27
Payer: MEDICAID

## 2023-03-27 ENCOUNTER — APPOINTMENT (OUTPATIENT)
Dept: OTHER | Facility: HOSPITAL | Age: 38
End: 2023-03-27
Payer: MEDICAID

## 2023-03-27 LAB
ANION GAP SERPL CALCULATED.3IONS-SCNC: 7.7 MMOL/L (ref 5–15)
BUN SERPL-MCNC: 21 MG/DL (ref 6–20)
BUN/CREAT SERPL: 21 (ref 7–25)
CALCIUM SPEC-SCNC: 9.5 MG/DL (ref 8.6–10.5)
CHLORIDE SERPL-SCNC: 102 MMOL/L (ref 98–107)
CO2 SERPL-SCNC: 27.3 MMOL/L (ref 22–29)
CREAT SERPL-MCNC: 1 MG/DL (ref 0.76–1.27)
DEPRECATED RDW RBC AUTO: 38.6 FL (ref 37–54)
EGFRCR SERPLBLD CKD-EPI 2021: 98.2 ML/MIN/1.73
ERYTHROCYTE [DISTWIDTH] IN BLOOD BY AUTOMATED COUNT: 12.2 % (ref 12.3–15.4)
GLUCOSE BLDC GLUCOMTR-MCNC: 140 MG/DL (ref 70–130)
GLUCOSE BLDC GLUCOMTR-MCNC: 169 MG/DL (ref 70–130)
GLUCOSE BLDC GLUCOMTR-MCNC: 170 MG/DL (ref 70–130)
GLUCOSE BLDC GLUCOMTR-MCNC: 210 MG/DL (ref 70–130)
GLUCOSE SERPL-MCNC: 186 MG/DL (ref 65–99)
HCT VFR BLD AUTO: 46.4 % (ref 37.5–51)
HGB BLD-MCNC: 15 G/DL (ref 13–17.7)
MCH RBC QN AUTO: 28.2 PG (ref 26.6–33)
MCHC RBC AUTO-ENTMCNC: 32.3 G/DL (ref 31.5–35.7)
MCV RBC AUTO: 87.2 FL (ref 79–97)
PLATELET # BLD AUTO: 276 10*3/MM3 (ref 140–450)
PMV BLD AUTO: 9.4 FL (ref 6–12)
POTASSIUM SERPL-SCNC: 4.7 MMOL/L (ref 3.5–5.2)
RBC # BLD AUTO: 5.32 10*6/MM3 (ref 4.14–5.8)
SODIUM SERPL-SCNC: 137 MMOL/L (ref 136–145)
WBC NRBC COR # BLD: 9.48 10*3/MM3 (ref 3.4–10.8)

## 2023-03-27 PROCEDURE — 82962 GLUCOSE BLOOD TEST: CPT

## 2023-03-27 PROCEDURE — 73720 MRI LWR EXTREMITY W/O&W/DYE: CPT

## 2023-03-27 PROCEDURE — 63710000001 INSULIN LISPRO (HUMAN) PER 5 UNITS: Performed by: INTERNAL MEDICINE

## 2023-03-27 PROCEDURE — A9577 INJ MULTIHANCE: HCPCS | Performed by: STUDENT IN AN ORGANIZED HEALTH CARE EDUCATION/TRAINING PROGRAM

## 2023-03-27 PROCEDURE — 85027 COMPLETE CBC AUTOMATED: CPT | Performed by: STUDENT IN AN ORGANIZED HEALTH CARE EDUCATION/TRAINING PROGRAM

## 2023-03-27 PROCEDURE — G0378 HOSPITAL OBSERVATION PER HR: HCPCS

## 2023-03-27 PROCEDURE — 0 GADOBENATE DIMEGLUMINE 529 MG/ML SOLUTION: Performed by: STUDENT IN AN ORGANIZED HEALTH CARE EDUCATION/TRAINING PROGRAM

## 2023-03-27 PROCEDURE — 80048 BASIC METABOLIC PNL TOTAL CA: CPT | Performed by: STUDENT IN AN ORGANIZED HEALTH CARE EDUCATION/TRAINING PROGRAM

## 2023-03-27 RX ORDER — HYDROCHLOROTHIAZIDE 25 MG/1
25 TABLET ORAL
Status: DISCONTINUED | OUTPATIENT
Start: 2023-03-27 | End: 2023-03-28 | Stop reason: HOSPADM

## 2023-03-27 RX ORDER — LISINOPRIL 20 MG/1
20 TABLET ORAL
Status: DISCONTINUED | OUTPATIENT
Start: 2023-03-27 | End: 2023-03-28 | Stop reason: HOSPADM

## 2023-03-27 RX ADMIN — Medication 10 ML: at 20:32

## 2023-03-27 RX ADMIN — INSULIN LISPRO 4 UNITS: 100 INJECTION, SOLUTION INTRAVENOUS; SUBCUTANEOUS at 08:26

## 2023-03-27 RX ADMIN — LISINOPRIL 20 MG: 20 TABLET ORAL at 10:17

## 2023-03-27 RX ADMIN — GADOBENATE DIMEGLUMINE 20 ML: 529 INJECTION, SOLUTION INTRAVENOUS at 07:35

## 2023-03-27 RX ADMIN — GABAPENTIN 300 MG: 300 CAPSULE ORAL at 20:32

## 2023-03-27 RX ADMIN — ATORVASTATIN CALCIUM 20 MG: 20 TABLET, FILM COATED ORAL at 08:26

## 2023-03-27 RX ADMIN — HYDROCHLOROTHIAZIDE 25 MG: 25 TABLET ORAL at 10:17

## 2023-03-27 RX ADMIN — GABAPENTIN 300 MG: 300 CAPSULE ORAL at 15:48

## 2023-03-27 RX ADMIN — Medication 10 ML: at 08:26

## 2023-03-27 RX ADMIN — INSULIN LISPRO 8 UNITS: 100 INJECTION, SOLUTION INTRAVENOUS; SUBCUTANEOUS at 12:27

## 2023-03-27 RX ADMIN — GABAPENTIN 300 MG: 300 CAPSULE ORAL at 08:26

## 2023-03-27 RX ADMIN — INSULIN LISPRO 4 UNITS: 100 INJECTION, SOLUTION INTRAVENOUS; SUBCUTANEOUS at 18:02

## 2023-03-27 NOTE — PLAN OF CARE
Goal Outcome Evaluation:            Pt. Alert, oriented x4, ambulatory, used bathroom self in his room, dressing to left lateral foot area intact, Pt. V/s stable, no voiced c/o pain, 02 sats kept over 90% on room air, no s/s acute distress noted

## 2023-03-27 NOTE — PROGRESS NOTES
Name: Osvaldo Welsh ADMIT: 3/25/2023   : 1985  PCP: Chuy Hays APRN    MRN: [id number] LOS: 0 days   AGE/SEX: 37 y.o. male  ROOM: 60/     Subjective   Subjective   MRI done this morning, read pending.  Discussed negative bilateral lower extremity duplex.  Patient with some foot pain, but states that it is manageable.  No fevers or chills.    Review of Systems   Constitutional: Negative for chills and fever.   Respiratory: Negative for cough and shortness of breath.    Cardiovascular: Negative for chest pain.   Gastrointestinal: Negative for abdominal pain, diarrhea and nausea.   Genitourinary: Negative for dysuria.   Musculoskeletal:        Right foot pain   Neurological: Negative for headaches.     As above     Objective   Objective   Vital Signs  Temp:  [98.6 °F (37 °C)] 98.6 °F (37 °C)  Heart Rate:  [90-94] 94  Resp:  [16-18] 18  BP: (114-139)/(73-94) 139/94  SpO2:  [96 %] 96 %  on   ;   Device (Oxygen Therapy): room air  Body mass index is 39.89 kg/m².  Physical Exam  Constitutional:       Appearance: He is obese.   HENT:      Head: Normocephalic and atraumatic.   Eyes:      Extraocular Movements: Extraocular movements intact.      Conjunctiva/sclera: Conjunctivae normal.   Cardiovascular:      Rate and Rhythm: Normal rate and regular rhythm.      Pulses: Normal pulses.      Heart sounds: No murmur heard.    No gallop.   Pulmonary:      Effort: Pulmonary effort is normal. No respiratory distress.      Breath sounds: No wheezing.   Abdominal:      General: Abdomen is flat. Bowel sounds are normal. There is no distension.      Palpations: Abdomen is soft.   Musculoskeletal:         General: No swelling or deformity.   Skin:     General: Skin is warm and dry.      Comments: Left forefoot ulcer.  Some erythema in the distal leg not near the ulcer.  Tenderness to palpation, edema   Neurological:      General: No focal deficit present.      Mental Status: He is alert. Mental status is at  baseline.         Results Review     I reviewed the patient's new clinical results.  Results from last 7 days   Lab Units 03/27/23  0823 03/26/23  0557 03/25/23 1951   WBC 10*3/mm3 9.48 12.06* 12.41*   HEMOGLOBIN g/dL 15.0 13.0 14.6   PLATELETS 10*3/mm3 276 204 258     Results from last 7 days   Lab Units 03/27/23  0823 03/26/23  0557 03/25/23 1951   SODIUM mmol/L 137 137 138   POTASSIUM mmol/L 4.7 3.5 4.1   CHLORIDE mmol/L 102 104 102   CO2 mmol/L 27.3 24.0 28.0   BUN mg/dL 21* 22* 22*   CREATININE mg/dL 1.00 0.84 1.06   GLUCOSE mg/dL 186* 189* 253*   Estimated Creatinine Clearance: 148.7 mL/min (by C-G formula based on SCr of 1 mg/dL).  Results from last 7 days   Lab Units 03/25/23 1951   ALBUMIN g/dL 3.8   BILIRUBIN mg/dL 0.2   ALK PHOS U/L 113   AST (SGOT) U/L 22   ALT (SGPT) U/L 24     Results from last 7 days   Lab Units 03/27/23  0823 03/26/23  0557 03/25/23 1951   CALCIUM mg/dL 9.5 8.3* 8.9   ALBUMIN g/dL  --   --  3.8     Results from last 7 days   Lab Units 03/26/23  0557   PROCALCITONIN ng/mL 0.10   No results found for: COVID19  Glucose   Date/Time Value Ref Range Status   03/27/2023 1106 210 (H) 70 - 130 mg/dL Final     Comment:     Meter: [id number] : [id number] Ernst Raulitocintia NA   03/27/2023 0550 170 (H) 70 - 130 mg/dL Final     Comment:     Meter: [id number] : [id number] Misael Stewart CARMEN   03/26/2023 2113 216 (H) 70 - 130 mg/dL Final     Comment:     Meter: [id number] : [id number] Misael Stewart CARMEN   03/26/2023 1605 170 (H) 70 - 130 mg/dL Final     Comment:     Meter: [id number] : [id number] Jovi BURR   03/26/2023 1119 97 70 - 130 mg/dL Final     Comment:     Meter: [id number] : [id number] Jovi BURR   03/26/2023 0615 166 (H) 70 - 130 mg/dL Final     Comment:     Meter: [id number] : [id number] Jonny BURR   03/26/2023 0122 119 70 - 130 mg/dL Final     Comment:     Meter: [id number] : [id number] Jonny BURR       XR Outside Films  This procedure was  auto-finalized with no dictation required.  XR Outside Films  This procedure was auto-finalized with no dictation required.  XR Outside Films  This procedure was auto-finalized with no dictation required.  XR Outside Films  This procedure was auto-finalized with no dictation required.    I reviewed the patient's daily medications.  Scheduled Medications  atorvastatin, 20 mg, Oral, Daily  gabapentin, 300 mg, Oral, TID  lisinopril, 20 mg, Oral, Q24H   And  hydroCHLOROthiazide, 25 mg, Oral, Q24H  insulin lispro, 0-24 Units, Subcutaneous, TID AC  sodium chloride, 10 mL, Intravenous, Q12H    Infusions   Diet  Diet: Diabetic Diets; Consistent Carbohydrate; Texture: Regular Texture (IDDSI 7); Fluid Consistency: Thin (IDDSI 0)         I have personally reviewed:  [x]  Laboratory   [x]  Microbiology   [x]  Radiology   []  EKG/Telemetry   []  Cardiology/Vascular   []  Pathology   [x]  Records     Assessment/Plan     Active Hospital Problems    Diagnosis  POA   • **Localized swelling of left lower extremity [R22.42]  Yes   • Ulcer of left foot (HCC) [L97.529]  Yes   • Left leg cellulitis [L03.116]  Unknown   • Type 2 diabetes mellitus with hyperglycemia, with long-term current use of insulin (HCC) [E11.65, Z79.4]  Not Applicable   • Essential hypertension [I10]  Yes   • Class 2 obesity without serious comorbidity with body mass index (BMI) of 38.0 to 38.9 in adult [E66.9, Z68.38]  Not Applicable      Resolved Hospital Problems   No resolved problems to display.       37 y.o. male admitted with Localized swelling of left lower extremity.    Left foot ulcer  Nonpurulent cellulitis  Left leg swelling  -White blood cell count: 12, procalcitonin 0.1  -Blood cultures negative for 24 hours  -D-dimer elevated, Doppler negative for DVT  -X-ray with concern for deeper infection, will get MRI to rule out osteomyelitis  -Hold further antibiotics until osteomyelitis is ruled out just in case a bone biopsy is needed  -If no osteomyelitis  plan to discharge on 10 days of doxycycline.  -Follows with Dr. Montes, podiatrist, outpatient    Diabetes type 2  -Sliding-scale insulin for now  -Hold home diabetes medications    Hyperlipidemia-continue statin    Obesity    · Lovenox 40 mg SC daily for DVT prophylaxis.  · Full code.  · Discussed with patient and nursing staff.  Anticipate discharge home if MRI is normal.    Elvin Jon MD  [facility]  03/27/23  14:07 EDT        -

## 2023-03-27 NOTE — PLAN OF CARE
Goal Outcome Evaluation:  Plan of Care Reviewed With: patient        Progress: no change  Outcome Evaluation: Pt sleeping on and off between care, denies pain, wound care to L foot wound completed, MRI of L foot completed, ac/hs with supplemental insulin provided, Eating and drinking adequately, up to br independently. jiast on tele, nad.

## 2023-03-27 NOTE — NURSING NOTE
03/27/23 0842   Wound 03/27/23 Left lateral foot Diabetic Ulcer   Placement Date: 03/27/23   Present on Hospital Admission: Yes  Side: Left  Orientation: lateral  Location: foot  Primary Wound Type: Diabetic Ulcer   Base moist;pink;yellow   Periwound intact  (some dry callous)   Periwound Temperature warm   Wound Length (cm) 2 cm   Wound Width (cm) 2.5 cm   Wound Depth (cm) 0.1 cm   Wound Surface Area (cm^2) 5 cm^2   Wound Volume (cm^3) 0.5 cm^3   Drainage Characteristics/Odor serosanguineous   Drainage Amount scant   Care, Wound cleansed with;sterile normal saline;antimicrobial agent applied   Dressing Care dressing changed  (Betadine wet to moist dressing placed, iodosorb gel ordered)   Periwound Care dry periwound area maintained     CWON note: pt seen for evaluation of left lateral diabetic foot wound POA. He is followed by podiatry in the OP setting, MRI results are pending. Will continue iodosorb gel daily per his OP wound care orders. Order placed into EPIC. Please re-consult for any additional needs.

## 2023-03-28 VITALS
HEIGHT: 74 IN | WEIGHT: 310.7 LBS | DIASTOLIC BLOOD PRESSURE: 57 MMHG | TEMPERATURE: 97.8 F | HEART RATE: 73 BPM | BODY MASS INDEX: 39.87 KG/M2 | OXYGEN SATURATION: 96 % | RESPIRATION RATE: 16 BRPM | SYSTOLIC BLOOD PRESSURE: 93 MMHG

## 2023-03-28 LAB
ANION GAP SERPL CALCULATED.3IONS-SCNC: 7 MMOL/L (ref 5–15)
BASOPHILS # BLD AUTO: 0.06 10*3/MM3 (ref 0–0.2)
BASOPHILS NFR BLD AUTO: 0.6 % (ref 0–1.5)
BUN SERPL-MCNC: 15 MG/DL (ref 6–20)
BUN/CREAT SERPL: 18.5 (ref 7–25)
CALCIUM SPEC-SCNC: 9.1 MG/DL (ref 8.6–10.5)
CHLORIDE SERPL-SCNC: 100 MMOL/L (ref 98–107)
CO2 SERPL-SCNC: 29 MMOL/L (ref 22–29)
CREAT SERPL-MCNC: 0.81 MG/DL (ref 0.76–1.27)
CRP SERPL-MCNC: 1.24 MG/DL (ref 0–0.5)
DEPRECATED RDW RBC AUTO: 36.8 FL (ref 37–54)
EGFRCR SERPLBLD CKD-EPI 2021: 115 ML/MIN/1.73
EOSINOPHIL # BLD AUTO: 0.44 10*3/MM3 (ref 0–0.4)
EOSINOPHIL NFR BLD AUTO: 4.1 % (ref 0.3–6.2)
ERYTHROCYTE [DISTWIDTH] IN BLOOD BY AUTOMATED COUNT: 11.9 % (ref 12.3–15.4)
ERYTHROCYTE [SEDIMENTATION RATE] IN BLOOD: 30 MM/HR (ref 0–15)
GLUCOSE BLDC GLUCOMTR-MCNC: 160 MG/DL (ref 70–130)
GLUCOSE BLDC GLUCOMTR-MCNC: 261 MG/DL (ref 70–130)
GLUCOSE SERPL-MCNC: 162 MG/DL (ref 65–99)
HCT VFR BLD AUTO: 43.3 % (ref 37.5–51)
HGB BLD-MCNC: 14.9 G/DL (ref 13–17.7)
IMM GRANULOCYTES # BLD AUTO: 0.06 10*3/MM3 (ref 0–0.05)
IMM GRANULOCYTES NFR BLD AUTO: 0.6 % (ref 0–0.5)
LYMPHOCYTES # BLD AUTO: 3.03 10*3/MM3 (ref 0.7–3.1)
LYMPHOCYTES NFR BLD AUTO: 28.3 % (ref 19.6–45.3)
MCH RBC QN AUTO: 29.3 PG (ref 26.6–33)
MCHC RBC AUTO-ENTMCNC: 34.4 G/DL (ref 31.5–35.7)
MCV RBC AUTO: 85.1 FL (ref 79–97)
MONOCYTES # BLD AUTO: 0.85 10*3/MM3 (ref 0.1–0.9)
MONOCYTES NFR BLD AUTO: 7.9 % (ref 5–12)
NEUTROPHILS NFR BLD AUTO: 58.5 % (ref 42.7–76)
NEUTROPHILS NFR BLD AUTO: 6.28 10*3/MM3 (ref 1.7–7)
NRBC BLD AUTO-RTO: 0 /100 WBC (ref 0–0.2)
PLATELET # BLD AUTO: 256 10*3/MM3 (ref 140–450)
PMV BLD AUTO: 9.4 FL (ref 6–12)
POTASSIUM SERPL-SCNC: 4.1 MMOL/L (ref 3.5–5.2)
PROCALCITONIN SERPL-MCNC: 0.08 NG/ML (ref 0–0.25)
RBC # BLD AUTO: 5.09 10*6/MM3 (ref 4.14–5.8)
SODIUM SERPL-SCNC: 136 MMOL/L (ref 136–145)
WBC NRBC COR # BLD: 10.72 10*3/MM3 (ref 3.4–10.8)

## 2023-03-28 PROCEDURE — 85025 COMPLETE CBC W/AUTO DIFF WBC: CPT | Performed by: STUDENT IN AN ORGANIZED HEALTH CARE EDUCATION/TRAINING PROGRAM

## 2023-03-28 PROCEDURE — 80048 BASIC METABOLIC PNL TOTAL CA: CPT | Performed by: STUDENT IN AN ORGANIZED HEALTH CARE EDUCATION/TRAINING PROGRAM

## 2023-03-28 PROCEDURE — 82962 GLUCOSE BLOOD TEST: CPT

## 2023-03-28 PROCEDURE — 63710000001 INSULIN LISPRO (HUMAN) PER 5 UNITS: Performed by: INTERNAL MEDICINE

## 2023-03-28 PROCEDURE — 84145 PROCALCITONIN (PCT): CPT | Performed by: STUDENT IN AN ORGANIZED HEALTH CARE EDUCATION/TRAINING PROGRAM

## 2023-03-28 PROCEDURE — 86140 C-REACTIVE PROTEIN: CPT | Performed by: STUDENT IN AN ORGANIZED HEALTH CARE EDUCATION/TRAINING PROGRAM

## 2023-03-28 PROCEDURE — G0378 HOSPITAL OBSERVATION PER HR: HCPCS

## 2023-03-28 PROCEDURE — 85652 RBC SED RATE AUTOMATED: CPT | Performed by: STUDENT IN AN ORGANIZED HEALTH CARE EDUCATION/TRAINING PROGRAM

## 2023-03-28 RX ORDER — DOXYCYCLINE 100 MG/1
100 CAPSULE ORAL EVERY 12 HOURS SCHEDULED
Status: DISCONTINUED | OUTPATIENT
Start: 2023-03-28 | End: 2023-03-28 | Stop reason: HOSPADM

## 2023-03-28 RX ORDER — DOXYCYCLINE 100 MG/1
100 CAPSULE ORAL EVERY 12 HOURS SCHEDULED
Qty: 14 CAPSULE | Refills: 0 | Status: SHIPPED | OUTPATIENT
Start: 2023-03-28 | End: 2023-04-04

## 2023-03-28 RX ADMIN — DOXYCYCLINE 100 MG: 100 CAPSULE ORAL at 12:54

## 2023-03-28 RX ADMIN — HYDROCHLOROTHIAZIDE 25 MG: 25 TABLET ORAL at 09:32

## 2023-03-28 RX ADMIN — LISINOPRIL 20 MG: 20 TABLET ORAL at 09:32

## 2023-03-28 RX ADMIN — GABAPENTIN 300 MG: 300 CAPSULE ORAL at 09:32

## 2023-03-28 RX ADMIN — ATORVASTATIN CALCIUM 20 MG: 20 TABLET, FILM COATED ORAL at 09:32

## 2023-03-28 RX ADMIN — INSULIN LISPRO 12 UNITS: 100 INJECTION, SOLUTION INTRAVENOUS; SUBCUTANEOUS at 12:54

## 2023-03-28 RX ADMIN — Medication 10 ML: at 09:33

## 2023-03-28 RX ADMIN — INSULIN LISPRO 4 UNITS: 100 INJECTION, SOLUTION INTRAVENOUS; SUBCUTANEOUS at 09:32

## 2023-03-28 NOTE — DISCHARGE SUMMARY
Patient Name: Osvaldo Welsh  : 1985  MRN: [id number]    Date of Admission: 3/25/2023  Date of Discharge:  3/28/2023  Primary Care Physician: Chuy Hays APRN      Chief Complaint:   Leg Pain      Discharge Diagnoses     Active Hospital Problems    Diagnosis  POA   • **Localized swelling of left lower extremity [R22.42]  Yes   • Ulcer of left foot (HCC) [L97.529]  Yes   • Left leg cellulitis [L03.116]  Unknown   • Type 2 diabetes mellitus with hyperglycemia, with long-term current use of insulin (HCC) [E11.65, Z79.4]  Not Applicable   • Essential hypertension [I10]  Yes   • Class 2 obesity without serious comorbidity with body mass index (BMI) of 38.0 to 38.9 in adult [E66.9, Z68.38]  Not Applicable      Resolved Hospital Problems   No resolved problems to display.        Hospital Course     Mr. Welsh is a 37 y.o. male with a history of uncontrolled diabetes, hyperlipidemia, obesity presenting with worsening left foot ulcer, left leg swelling, cellulitis.  Lower extremity Doppler was negative for DVT.  X-ray was concerning for a deeper infection so MRI was obtained that showed lateral foot cellulitis.  Also showed midfoot either neuropathic arthropathy or chronic infection.  Patient has no ulcer here so unlikely to be chronic infection.  Infectious disease was consulted and recommended doxycycline 100 mg time 7 days.  Patient to follow-up with his podiatrist as an outpatient.    Diabetes type 2, uncontrolled-patient was actually just on low-dose sliding scale while here.  Patient did confirm that he takes Lantus 90 units a day.  So likely he is eating significantly more food at home than here in the hospital.    At the time of discharge patient was told to take all medications as prescribed, keep all follow-up appointments, and call their doctor or return to the hospital with any worsening or concerning symptoms.    Day of Discharge     Subjective:  No acute events overnight.  Having some left  foot pain, but manageable.  No fevers or chills.    Review of Systems   Respiratory: Negative for cough and shortness of breath.    Cardiovascular: Negative for chest pain and palpitations.   Gastrointestinal: Negative for abdominal pain, diarrhea, nausea and vomiting.   Genitourinary: Negative for dysuria.   Neurological: Negative for headaches.       Physical Exam:  Temp:  [97.7 °F (36.5 °C)-98.1 °F (36.7 °C)] 97.8 °F (36.6 °C)  Heart Rate:  [73-84] 73  Resp:  [16-18] 16  BP: ()/(57-95) 93/57  Body mass index is 39.89 kg/m².  Physical Exam  Constitutional:       Appearance: He is obese.   HENT:      Head: Normocephalic and atraumatic.   Eyes:      Extraocular Movements: Extraocular movements intact.      Conjunctiva/sclera: Conjunctivae normal.   Cardiovascular:      Rate and Rhythm: Normal rate and regular rhythm.      Pulses: Normal pulses.      Heart sounds: No murmur heard.    No gallop.   Pulmonary:      Effort: Pulmonary effort is normal. No respiratory distress.      Breath sounds: No wheezing.   Abdominal:      General: Abdomen is flat. Bowel sounds are normal. There is no distension.      Palpations: Abdomen is soft.   Musculoskeletal:         General: No swelling or deformity.   Skin:     General: Skin is warm and dry.      Comments: Left forefoot ulcer.  Some erythema in the distal leg not near the ulcer.  Tenderness to palpation, edema   Neurological:      General: No focal deficit present.      Mental Status: He is alert. Mental status is at baseline.       Consultants     Consult Orders (all) (From admission, onward)     Start     Ordered    03/28/23 0721  Inpatient Infectious Diseases Consult  Once        Specialty:  Infectious Diseases  Provider:  Jasiel Brunner MD    03/28/23 0721    03/25/23 2306  [facility] (on-call MD unless specified) Details  Once        Specialty:  Hospitalist  Provider:  Sergey Lund MD    03/25/23 2309              Procedures     Imaging Results (All)      Procedure Component Value Units Date/Time    MRI Foot Left With & Without Contrast [[id number]] Collected: 03/27/23 1811     Updated: 03/28/23 0725    Narrative:      MRI LEFT MID AND FOREFOOT WITH AND WITHOUT CONTRAST     HISTORY: Soft tissue wound at the base of the 5th toe for several weeks.  Diabetes. Abnormal x-rays.     TECHNIQUE: MRI left mid and forefoot is correlated with MRI 10/31/2019,  x-rays acquired 03/25/2023 and 04/13/2020.     FINDINGS: There appears to have been interval surgery to the 2nd and 3rd  metatarsal heads and necks. There are tracks from previously removed  pins or screws at the metaphyses of those bones with cortical deformity,  marginal osteophyte, and shortening particularly at the 2nd metatarsal.  There is some subcortical cystic change along the plantar side of the  2nd metatarsal head. There is a normal volume of fluid at the  metatarsophalangeal joints and at the inner phalangeal joints. No  synovitis is present.     Scattered foci of susceptibility artifact are observed in the soft  tissue dorsal to the 2nd and 3rd metatarsal heads which is believed to  be postsurgical in nature.     Abnormal superficial soft tissue plantar and lateral to the 5th  metatarsal head where the skin surface has an irregular contour possibly  representing a wound measuring about 13 mm diameter. The underlying  tissue is edematous but there is no loculated fluid collection.  Diminished enhancement of the tissue is observed around the lesion over  a diameter of about 3 cm which may represent devascularized tissue. No  evidence of osteomyelitis around the 5th metatarsophalangeal joint.     As demonstrated on the x-rays, there has been substantial interval  progression of arthropathic change at the midfoot. There is new,  irregular chondral loss at the 2nd tarsometatarsal joint with marginal  osteophyte, joint effusions and periarticular cyst or erosions. Abnormal  marrow edema and enhancement are  observed in the visualized cuneiforms  and throughout the bases and the shafts of every metatarsal. The  Lisfranc complex is edematous but does not appear to be frankly torn.     Foot musculature is diffusely edematous with some enhancement. No focal  fluid collection.       Impression:      Soft tissue wound along the plantar side of the foot  superficial to the 2nd and 3rd metatarsal heads present in 2019 has  healed in the interval and there is postoperative change at the 2nd and  3rd metatarsal heads. There is some abnormal signal in the soft tissue  around the plantar side of the 5th metatarsal head where there appears  to be a shallow wound and diminished enhancement of soft tissue  suggesting poor vascularity to this area. However, there is no focal  fluid collection or evidence of osteomyelitis or septic arthritis.     Interval development of substantial destructive arthropathic change in  the left midfoot as demonstrated on x-ray. In the absence of an open  wound around the midfoot, sterile neuropathic arthropathy could account  for these findings, including the abnormal marrow signal. Given the  history of chronic foot infections however, a chronic midfoot infection  is also a consideration.     This report was finalized on 3/28/2023 7:22 AM by Dr. Everett Light M.D.       XR Outside Films [[id number]] Resulted: 03/27/23 1201     Updated: 03/27/23 1201    Narrative:      This procedure was auto-finalized with no dictation required.    XR Outside Films [[id number]] Resulted: 03/27/23 1156     Updated: 03/27/23 1156    Narrative:      This procedure was auto-finalized with no dictation required.    XR Outside Films [[id number]] Resulted: 03/27/23 1151     Updated: 03/27/23 1151    Narrative:      This procedure was auto-finalized with no dictation required.    XR Outside Films [[id number]] Resulted: 03/27/23 1145     Updated: 03/27/23 1145    Narrative:      This procedure was auto-finalized with no dictation  required.    XR Foot 3+ View Left [[id number]] Collected: 03/25/23 2245     Updated: 03/25/23 2259    Narrative:      LEFT FOOT: AP, LATERAL, OBLIQUE     HISTORY: Leg pain.     COMPARISON: Left foot x-rays 04/13/2020.     FINDINGS: There is flattening of the second metatarsal head. Small  cortical tracks are present within the second third metatarsal necks and  this appears to be related to previous surgery. There is widening of the  joint spaces second MTP joint and there is narrowing of joint spaces of  the third MTP joint with marginal spur formation. Small chronic  periarticular foci of ossification are present.     There is smooth periosteal elevation involving the second through fifth  metatarsal shafts greatest involving the lateral aspects of the proximal  second and third metatarsal shafts. There are arthritic changes at the  second and third MTP joints. Midfoot alignment appears normal. No  fracture is evident. Lateral view demonstrates chronic spurring and  ossification extending dorsal to the TMT joints. Degenerative heel spurs  are present.       Impression:      1. Since the previous x-rays approximately 3 years ago there has  developed periosteal elevation involving the proximal mid shafts of the  second through fifth metatarsals greatest involving the second and third  metatarsals. There are also progressive arthritic changes at the TMT  joints. These changes are of uncertain etiology and may be related to  previous trauma or infection, potentially active. MRI would be the best  means to further evaluate.  2. There have also developed new arthritic and postsurgical changes of  the second and third MTP joints.     This report was finalized on 3/25/2023 10:56 PM by Dr. Krish Arcos M.D.             Pertinent Labs     Results from last 7 days   Lab Units 03/28/23  0719 03/27/23  0823 03/26/23  0557 03/25/23 1951   WBC 10*3/mm3 10.72 9.48 12.06* 12.41*   HEMOGLOBIN g/dL 14.9 15.0 13.0 14.6    PLATELETS 10*3/mm3 256 276 204 258     Results from last 7 days   Lab Units 03/28/23  0719 03/27/23  0823 03/26/23  0557 03/25/23 1951   SODIUM mmol/L 136 137 137 138   POTASSIUM mmol/L 4.1 4.7 3.5 4.1   CHLORIDE mmol/L 100 102 104 102   CO2 mmol/L 29.0 27.3 24.0 28.0   BUN mg/dL 15 21* 22* 22*   CREATININE mg/dL 0.81 1.00 0.84 1.06   GLUCOSE mg/dL 162* 186* 189* 253*   Estimated Creatinine Clearance: 183.6 mL/min (by C-G formula based on SCr of 0.81 mg/dL).  Results from last 7 days   Lab Units 03/25/23 1951   ALBUMIN g/dL 3.8   BILIRUBIN mg/dL 0.2   ALK PHOS U/L 113   AST (SGOT) U/L 22   ALT (SGPT) U/L 24     Results from last 7 days   Lab Units 03/28/23  0719 03/27/23  0823 03/26/23  0557 03/25/23 1951   CALCIUM mg/dL 9.1 9.5 8.3* 8.9   ALBUMIN g/dL  --   --   --  3.8       Results from last 7 days   Lab Units 03/25/23 1951   D DIMER QUANT MCGFEU/mL 1.06*           Invalid input(s): LDLCALC  Results from last 7 days   Lab Units 03/25/23  2323 03/25/23  2201   BLOODCX  No growth at 2 days No growth at 2 days       Test Results Pending at Discharge     Pending Labs     Order Current Status    Blood Culture - Blood, Arm, Right Preliminary result    Blood Culture - Blood, Arm, Right Preliminary result          Discharge Details        Discharge Medications      New Medications      Instructions Start Date   doxycycline 100 MG capsule  Commonly known as: MONODOX   100 mg, Oral, Every 12 Hours Scheduled         Changes to Medications      Instructions Start Date   insulin glargine 100 UNIT/ML injection  Commonly known as: LANTUS, SEMGLEE  What changed: Another medication with the same name was removed. Continue taking this medication, and follow the directions you see here.   90 Units, Subcutaneous, Daily, Takes in the am.         Continue These Medications      Instructions Start Date   Accu-Chek Maris Plus w/Device kit   Dx e11.9 use to check BS BID ok to substitute formulary preferred      aspirin 81 MG EC  tablet   TAKE (1) TABLET DAILY      atorvastatin 20 MG tablet  Commonly known as: LIPITOR   20 mg, Oral, Daily      B-D ULTRAFINE III SHORT PEN 31G X 8 MM misc  Generic drug: Insulin Pen Needle   USE EVERY MORNING FOR INSULIN INJECTION      B-D ULTRAFINE III SHORT PEN 31G X 8 MM misc  Generic drug: Insulin Pen Needle   Use to inject insulin daily      Farxiga 10 MG tablet  Generic drug: dapagliflozin Propanediol   10 mg, Oral, Daily      gabapentin 300 MG capsule  Commonly known as: NEURONTIN   300 mg, Oral, Every 8 Hours Scheduled      glucose blood test strip   accucheck strips daily e11.9      glucose blood test strip  Commonly known as: Accu-Chek Maris Plus   Dx e11.9 use to check BS BID ok to substitute formulary preferred      True Metrix Blood Glucose Test test strip  Generic drug: glucose blood   Dx code E11.65 testing bs 1 x day      lisinopril-hydrochlorothiazide 20-25 MG per tablet  Commonly known as: PRINZIDE,ZESTORETIC   TAKE (1) TABLET DAILY      [facility] Wound/Burn Dressing Paste   APPLY AS DIRECTED      methocarbamol 750 MG tablet  Commonly known as: ROBAXIN   750 mg, Oral, 4 Times Daily      NON FORMULARY   4.25 mcg, Injection, Weekly, TRULICITY Last dose Monday, 2/6/23      tadalafil 20 MG tablet  Commonly known as: CIALIS   20 mg, Oral, As Needed      TRUEplus Lancets 28G misc   CHECK BLOOD GLUCOSE DAILY      vitamin D 1.25 MG (65868 UT) capsule capsule  Commonly known as: ERGOCALCIFEROL   50,000 Units, Oral, 2 Times Weekly             Allergies   Allergen Reactions   • Metformin Other (See Comments)     Fatigue and muscle aches         Discharge Disposition:  Home or Self Care    Discharge Diet:  Diet Order   Procedures   • Diet: Diabetic Diets; Consistent Carbohydrate; Texture: Regular Texture (IDDSI 7); Fluid Consistency: Thin (IDDSI 0)       Discharge Activity:   As tolerated    CODE STATUS:    Code Status and Medical Interventions:   Ordered at: 03/26/23 0010     Code Status (Patient has no  pulse and is not breathing):    CPR (Attempt to Resuscitate)     Medical Interventions (Patient has pulse or is breathing):    Full Support       Future Appointments   Date Time Provider Department Center   3/29/2023 10:30 AM Fredo De Santiago MD [facility]      Follow-up Information     Chuy Hays APRN .    Specialty: Nurse Practitioner  Contact information:  [address] [facility] [id number] [phone]                         Time Spent on Discharge:  Greater than 30 minutes      Elvin Jon MD  [facility]  03/28/23  10:28 EDT

## 2023-03-28 NOTE — CONSULTS
Referring Provider: Sergey Lund MD  Reason for Consultation: MRI left foot with possible chronic midfoot infection versus sterile neuropathic arthropathy  Chief Complaint   Patient presents with   • Leg Pain         Subjective   History of present illness: Patient is a 37-year-old male with past medical history of diabetes who presents with left foot wound.  ID consulted for left foot MRI with possible chronic midfoot infection versus sterile neuropathic arthropathy.    Patient reports he follows weekly with a podiatrist and has developed a left foot ulcer over the lateral aspect with subsequent debridement and local wound care by his podiatrist.  He has noticed increased swelling and erythema of the left lower extremity and came to the hospital.    Patient's been afebrile and had leukocytosis on presentation that is now resolved.  He was initially given cefepime, doxycycline and vancomycin with elevated CRP that has down trended.  Procalcitonin has remained negative and MRI was performed that showed no evidence of osteomyelitis or abscess at the site of his left foot ulcer but changes in the midfoot consistent with neuropathic arthropathy versus midfoot infection.    Past Medical History:   Diagnosis Date   • Asthma    • Diabetes (HCC)    • Hyperlipidemia    • Hypertension    • Optic nerve hemorrhage, left        Past Surgical History:   Procedure Laterality Date   • TONSILLECTOMY AND ADENOIDECTOMY         family history includes Diabetes in his father, paternal grandfather, and paternal grandmother; Hypertension in his maternal grandfather and maternal grandmother.     reports that he has never smoked. He has never been exposed to tobacco smoke. He has never used smokeless tobacco. He reports current alcohol use. He reports that he does not use drugs.     Allergies   Allergen Reactions   • Metformin Other (See Comments)     Fatigue and muscle aches       Medication:  Antibiotics:  Anti-Infectives (From  admission, onward)    Ordered     Dose/Rate Route Frequency Start Stop    03/25/23 2304  vancomycin 2750 mg/1000 mL 0.9% NS IVPB        Ordering Provider: Víctor Stroud III, PA    20 mg/kg × 138 kg Intravenous Once 03/25/23 2306 03/26/23 0237    03/25/23 2304  cefepime 2 gm IVPB in 100 ml NS (VTB)        Ordering Provider: Víctor Stroud III, PA    2 g  over 30 Minutes Intravenous Once 03/25/23 2306 03/26/23 0005            Objective     Physical Exam:   Vital Signs   Temp:  [97.7 °F (36.5 °C)-98.1 °F (36.7 °C)] 97.8 °F (36.6 °C)  Heart Rate:  [73-94] 73  Resp:  [16-18] 16  BP: ()/(57-95) 93/57    GENERAL: Awake and alert, in no acute distress.   HEENT: Oropharynx is clear. Hearing is grossly normal.   EYES: PERRL. No conjunctival injection. No lid lag.   HEART: Regular rate and regular rhythm. No peripheral edema.   LUNGS: Normal work of breathing  GI: Soft, nontender, nondistended.   SKIN: Left foot with lateral ulcer without purulent drainage.  Minimal surrounding erythema.  PSYCHIATRIC: Appropriate mood, affect, insight, and judgment.     Results Review:   I reviewed the patient's new clinical results.  I reviewed the patient's new imaging results and agree with the interpretation.  I reviewed the patient's other test results and agree with the interpretation    Lab Results   Component Value Date    WBC 10.72 03/28/2023    HGB 14.9 03/28/2023    HCT 43.3 03/28/2023    MCV 85.1 03/28/2023     03/28/2023       No results found for: VANCOPEAK, VANCOTROUGH, VANCORANDOM    Lab Results   Component Value Date    GLUCOSE 162 (H) 03/28/2023    BUN 15 03/28/2023    CREATININE 0.81 03/28/2023    EGFRIFNONA 75 03/12/2021    EGFRIFAFRI 90 03/12/2021    BCR 18.5 03/28/2023    CO2 29.0 03/28/2023    CALCIUM 9.1 03/28/2023    PROTENTOTREF 7.6 03/12/2021    ALBUMIN 3.8 03/25/2023    LABIL2 0.9 (L) 02/10/2022    AST 22 03/25/2023    ALT 24 03/25/2023         Estimated Creatinine Clearance: 183.6  mL/min (by C-G formula based on SCr of 0.81 mg/dL).      Microbiology:  3/25 blood cultures no growth to date    Radiology:  3/27 left foot MRI with interval healing of plantar ulcer.  New lateral soft tissue ulcer around the fifth metatarsal head that appears shallow with no evidence of osteomyelitis or septic arthritis.  Interval development of destructive arthropathic changes in the midfoot.    Assessment   #Diabetic foot ulcer with cellulitis  #Midfoot neuropathic arthropathy  #Diabetes contributing to the above     MRI appears more concerning for neuropathic arthropathy and destructive changes related to this than underlying infection.  Without continuous ulcer it seems more likely that this would be the culprit than chronic infection.  Agree with plan for cellulitis treatment of doxycycline 100 mg twice daily and follow-up with his podiatrist.  Counseled on foot wear and wound care.  Counseled on risk of infection going forward given his diabetes and neuropathy.      Thank you for allowing me to be involved in the care of this patient. Infectious diseases will sign off at this time with antibiotics plan in place, but please call me at [phone] if any further ID questions or new ID concerns.

## 2023-03-28 NOTE — PLAN OF CARE
Goal Outcome Evaluation:   Patient alert follows commands no c/o pain or nausea noted. Blood glucose monitored. No acute distress noted will continue to monitor

## 2023-03-29 ENCOUNTER — READMISSION MANAGEMENT (OUTPATIENT)
Dept: CALL CENTER | Facility: HOSPITAL | Age: 38
End: 2023-03-29
Payer: MEDICAID

## 2023-03-29 NOTE — OUTREACH NOTE
Prep Survey    Flowsheet Row Responses   [facility] facility patient discharged from? [place]   Is LACE score < 7 ? No   Eligibility Readm Mgmt   Discharge diagnosis Localized swelling of left lower extremity   Does the patient have one of the following disease processes/diagnoses(primary or secondary)? Other   Does the patient have Home health ordered? No   Is there a DME ordered? No   Prep survey completed? Yes          Sary PADILLA - Registered Nurse

## 2023-03-30 LAB — BACTERIA SPEC AEROBE CULT: NORMAL

## 2023-03-31 LAB — BACTERIA SPEC AEROBE CULT: NORMAL

## 2023-04-04 ENCOUNTER — READMISSION MANAGEMENT (OUTPATIENT)
Dept: CALL CENTER | Facility: HOSPITAL | Age: 38
End: 2023-04-04
Payer: MEDICAID

## 2023-04-04 NOTE — OUTREACH NOTE
Medical Week 1 Survey    Flowsheet Row Responses   [facility] patient discharged from? [place]   Does the patient have one of the following disease processes/diagnoses(primary or secondary)? Other   Week 1 attempt successful? No   Unsuccessful attempts Attempt 1          Nila BRUCE - Licensed Nurse

## 2023-05-22 ENCOUNTER — OFFICE VISIT (OUTPATIENT)
Dept: NEUROSURGERY | Facility: CLINIC | Age: 38
End: 2023-05-22
Payer: MEDICAID

## 2023-05-22 VITALS
HEART RATE: 94 BPM | TEMPERATURE: 97.1 F | HEIGHT: 74 IN | SYSTOLIC BLOOD PRESSURE: 110 MMHG | OXYGEN SATURATION: 96 % | DIASTOLIC BLOOD PRESSURE: 74 MMHG | BODY MASS INDEX: 39.89 KG/M2

## 2023-05-22 DIAGNOSIS — M51.26 HERNIATED NUCLEUS PULPOSUS, L4-5 LEFT: Primary | ICD-10-CM

## 2023-05-22 NOTE — PROGRESS NOTES
Subjective   History of Present Illness: Osvaldo Welsh is a 37 y.o. male is here today for follow-up for back and left leg pain and weakness.   He is currently in pain management next appointment the end of June.  He recently had surgery on his left foot for a diabetic ulcer.  He is doing well today.  He reports he has had intermittent left lower extremity pain for the past 2 years.  The pain will sometimes completely resolved.  The pain radiates down his left lower extremity to his foot.  He denies any weakness in his lower extremities.    History of Present Illness    The following portions of the patient's history were reviewed and updated as appropriate: allergies, past family history, past medical history, past social history, past surgical history and problem list.    Past Medical History:   Diagnosis Date   • Asthma    • Diabetes    • Hyperlipidemia    • Hypertension    • Optic nerve hemorrhage, left         Past Surgical History:   Procedure Laterality Date   • TONSILLECTOMY AND ADENOIDECTOMY            Current Outpatient Medications:   •  atorvastatin (LIPITOR) 20 MG tablet, Take 1 tablet by mouth Daily., Disp: 90 tablet, Rfl: 1  •  Blood Glucose Monitoring Suppl (ACCU-CHEK MANAV PLUS) w/Device kit, Dx e11.9 use to check BS BID ok to substitute formulary preferred, Disp: 1 kit, Rfl: 0  •  Dapagliflozin Propanediol (Farxiga) 10 MG tablet, Take 10 mg by mouth Daily., Disp: 90 tablet, Rfl: 1  •  gabapentin (NEURONTIN) 300 MG capsule, Take 1 capsule by mouth Every 8 (Eight) Hours., Disp: 90 capsule, Rfl: 0  •  glucose blood (ACCU-CHEK MANAV PLUS) test strip, Dx e11.9 use to check BS BID ok to substitute formulary preferred, Disp: 60 each, Rfl: 11  •  glucose blood test strip, accucheck strips daily e11.9, Disp: 100 each, Rfl: 12  •  insulin glargine (LANTUS, SEMGLEE) 100 UNIT/ML injection, Inject 90 Units under the skin into the appropriate area as directed Daily. Takes in the am., Disp: , Rfl:   •  Insulin  Pen Needle (B-D ULTRAFINE III SHORT PEN) 31G X 8 MM misc, USE EVERY MORNING FOR INSULIN INJECTION, Disp: 100 each, Rfl: 2  •  Insulin Pen Needle (B-D ULTRAFINE III SHORT PEN) 31G X 8 MM misc, Use to inject insulin daily, Disp: 100 each, Rfl: 1  •  lisinopril-hydrochlorothiazide (PRINZIDE,ZESTORETIC) 20-25 MG per tablet, TAKE (1) TABLET DAILY, Disp: 90 tablet, Rfl: 0  •  methocarbamol (ROBAXIN) 750 MG tablet, Take 1 tablet by mouth 4 (Four) Times a Day., Disp: 54 tablet, Rfl: 0  •  NON FORMULARY, Inject 4.25 mcg as directed 1 (One) Time Per Week. TRULICITY Last dose Monday, 2/6/23, Disp: , Rfl:   •  True Metrix Blood Glucose Test test strip, Dx code E11.65 testing bs 1 x day, Disp: 100 each, Rfl: 1  •  TRUEplus Lancets 28G misc, CHECK BLOOD GLUCOSE DAILY, Disp: 100 each, Rfl: 1  •  Wound Dressings (MEDIHONEY WOUND/BURN DRESSING) paste, APPLY AS DIRECTED, Disp: 44 mL, Rfl: 3  •  aspirin 81 MG EC tablet, TAKE (1) TABLET DAILY, Disp: 30 tablet, Rfl: 11  •  tadalafil (CIALIS) 20 MG tablet, Take 1 tablet by mouth As Needed for Erectile Dysfunction., Disp: 6 tablet, Rfl: 2  •  vitamin D (ERGOCALCIFEROL) 1.25 MG (92963 UT) capsule capsule, Take 1 capsule by mouth 2 (Two) Times a Week. (Patient not taking: Reported on 5/22/2023), Disp: 24 capsule, Rfl: 1     Allergies   Allergen Reactions   • Metformin Other (See Comments)     Fatigue and muscle aches        Social History     Socioeconomic History   • Marital status: Single   Tobacco Use   • Smoking status: Never     Passive exposure: Never   • Smokeless tobacco: Never   Vaping Use   • Vaping Use: Never used   Substance and Sexual Activity   • Alcohol use: Yes     Comment: social   • Drug use: No   • Sexual activity: Defer        Family History   Problem Relation Age of Onset   • Diabetes Father    • Hypertension Maternal Grandmother    • Hypertension Maternal Grandfather    • Diabetes Paternal Grandmother    • Diabetes Paternal Grandfather         Review of  "Systems    Objective     Vitals:    23 1242   BP: 110/74   Pulse: 94   Temp: 97.1 °F (36.2 °C)   SpO2: 96%   Height: 188 cm (74\")     Body mass index is 39.89 kg/m².      Physical Exam  Neurologic Exam  Awake, alert, oriented x3  Pupils equal round reactive to light  Extraocular muscles intact  Face symmetric  Speech is fluent and clear  No pronator drift  Motor exam  Bilateral deltoids 5/5, bilateral biceps 5/5, bilateral triceps 5/5, bilateral wrist extension 5/5 bilateral hand  5/5  Bilateral hip flexion 5/5, bilateral knee extension 5/5, bilateral DF/PF 5/5  No clonus  No Walt's reflex  Steady normal gait  Able to detect  light touch in all 4 extremities        Assessment & Plan       Medical Decision Makin-year-old male with a left L4-5 disc herniation and left sided radiculopathy  -He reports that the symptoms have been intermittent over the past 2 years.  He has not yet received a steroid epidural injection or followed up with physical therapy.  He just recently had surgery on his left foot for a severe diabetic ulcer.  He reports that he will follow-up with physical therapy after he heals from his surgery.  We discussed the risks and benefits of an L4-5 laminectomy and discectomy.  I have offered him this procedure if his pain persists or if he develops any weakness.  He would like to hold off from surgery for now.  He will plan to follow-up with physical therapy and pain management.  If there is no improvement he will follow-up with me.  If his symptoms resolve he will plan to follow-up as needed    Diagnoses and all orders for this visit:    1. Herniated nucleus pulposus, L4-5 left (Primary)      Return if symptoms worsen or fail to improve.    I spent 30 minutes reviewing the medical record, reviewing his previous MRI images, discussing the management of lumbar radiculopathies.  Discussing the risks and benefits of surgery     "

## 2023-07-10 NOTE — TELEPHONE ENCOUNTER
Evonne Jonese, girlfriend [phone] said [facility] [address] [phone] said they sent scripts in on 10-17 and have not got approved yet. Farxiga, 10mg, 1xday.  This is 1st script. Patient was given samples does not know days supply    Soliqua pens, started 40 units today,  1xday every 3 days if blood sugar is not 110 he turns up 2 units and blood sugar has not been 110 yet,     Evonne is not on HIPAA   Pt [phone]  
PA has been started  
No change